# Patient Record
Sex: MALE | Race: WHITE | URBAN - METROPOLITAN AREA
[De-identification: names, ages, dates, MRNs, and addresses within clinical notes are randomized per-mention and may not be internally consistent; named-entity substitution may affect disease eponyms.]

---

## 2024-09-26 ENCOUNTER — HOSPITAL ENCOUNTER (OUTPATIENT)
Facility: MEDICAL CENTER | Age: 39
End: 2024-09-26
Attending: EMERGENCY MEDICINE | Admitting: INTERNAL MEDICINE

## 2024-09-26 ENCOUNTER — APPOINTMENT (OUTPATIENT)
Dept: RADIOLOGY | Facility: MEDICAL CENTER | Age: 39
End: 2024-09-26
Attending: EMERGENCY MEDICINE
Payer: MEDICAID

## 2024-09-26 DIAGNOSIS — E03.9 ACQUIRED HYPOTHYROIDISM: ICD-10-CM

## 2024-09-26 DIAGNOSIS — R55 VASOVAGAL SYNCOPE: ICD-10-CM

## 2024-09-26 DIAGNOSIS — I10 PRIMARY HYPERTENSION: ICD-10-CM

## 2024-09-26 DIAGNOSIS — F31.9 CHRONIC BIPOLAR DISORDER (HCC): ICD-10-CM

## 2024-09-26 PROBLEM — M62.82 RHABDOMYOLYSIS: Status: ACTIVE | Noted: 2024-09-26

## 2024-09-26 PROBLEM — G93.40 ENCEPHALOPATHY: Status: ACTIVE | Noted: 2024-09-26

## 2024-09-26 PROBLEM — R45.851 SUICIDAL IDEATIONS: Status: ACTIVE | Noted: 2024-09-26

## 2024-09-26 LAB
AMMONIA PLAS-SCNC: 22 UMOL/L (ref 11–45)
AMPHET UR QL SCN: NEGATIVE
ANION GAP SERPL CALC-SCNC: 13 MMOL/L (ref 7–16)
APPEARANCE UR: CLEAR
BARBITURATES UR QL SCN: NEGATIVE
BASE EXCESS BLDV CALC-SCNC: 1 MMOL/L
BASOPHILS # BLD AUTO: 0.7 % (ref 0–1.8)
BASOPHILS # BLD: 0.04 K/UL (ref 0–0.12)
BENZODIAZ UR QL SCN: POSITIVE
BILIRUB UR QL STRIP.AUTO: NEGATIVE
BODY TEMPERATURE: 36.7 CENTIGRADE
BUN SERPL-MCNC: 20 MG/DL (ref 8–22)
BZE UR QL SCN: NEGATIVE
CALCIUM SERPL-MCNC: 10 MG/DL (ref 8.5–10.5)
CANNABINOIDS UR QL SCN: POSITIVE
CHLORIDE SERPL-SCNC: 103 MMOL/L (ref 96–112)
CK SERPL-CCNC: 528 U/L (ref 0–154)
CO2 SERPL-SCNC: 22 MMOL/L (ref 20–33)
COLOR UR: YELLOW
CREAT SERPL-MCNC: 1.13 MG/DL (ref 0.5–1.4)
EKG IMPRESSION: NORMAL
EOSINOPHIL # BLD AUTO: 0.15 K/UL (ref 0–0.51)
EOSINOPHIL NFR BLD: 2.5 % (ref 0–6.9)
ERYTHROCYTE [DISTWIDTH] IN BLOOD BY AUTOMATED COUNT: 40.2 FL (ref 35.9–50)
ETHANOL BLD-MCNC: <10.1 MG/DL
FENTANYL UR QL: NEGATIVE
GFR SERPLBLD CREATININE-BSD FMLA CKD-EPI: 86 ML/MIN/1.73 M 2
GLUCOSE SERPL-MCNC: 85 MG/DL (ref 65–99)
GLUCOSE UR STRIP.AUTO-MCNC: NEGATIVE MG/DL
HCO3 BLDV-SCNC: 27 MMOL/L (ref 24–28)
HCT VFR BLD AUTO: 42.5 % (ref 42–52)
HGB BLD-MCNC: 13.7 G/DL (ref 14–18)
IMM GRANULOCYTES # BLD AUTO: 0.02 K/UL (ref 0–0.11)
IMM GRANULOCYTES NFR BLD AUTO: 0.3 % (ref 0–0.9)
INHALED O2 FLOW RATE: NORMAL L/MIN
KETONES UR STRIP.AUTO-MCNC: NEGATIVE MG/DL
LEUKOCYTE ESTERASE UR QL STRIP.AUTO: NEGATIVE
LITHIUM SERPL-MCNC: 0.5 MMOL/L (ref 0.6–1.2)
LYMPHOCYTES # BLD AUTO: 2 K/UL (ref 1–4.8)
LYMPHOCYTES NFR BLD: 33.1 % (ref 22–41)
MCH RBC QN AUTO: 27.7 PG (ref 27–33)
MCHC RBC AUTO-ENTMCNC: 32.2 G/DL (ref 32.3–36.5)
MCV RBC AUTO: 85.9 FL (ref 81.4–97.8)
METHADONE UR QL SCN: NEGATIVE
MICRO URNS: NORMAL
MONOCYTES # BLD AUTO: 0.6 K/UL (ref 0–0.85)
MONOCYTES NFR BLD AUTO: 9.9 % (ref 0–13.4)
NEUTROPHILS # BLD AUTO: 3.24 K/UL (ref 1.82–7.42)
NEUTROPHILS NFR BLD: 53.5 % (ref 44–72)
NITRITE UR QL STRIP.AUTO: NEGATIVE
NRBC # BLD AUTO: 0 K/UL
NRBC BLD-RTO: 0 /100 WBC (ref 0–0.2)
OPIATES UR QL SCN: NEGATIVE
OXYCODONE UR QL SCN: NEGATIVE
PCO2 BLDV: 47.2 MMHG (ref 41–51)
PCO2 TEMP ADJ BLDV: 46.6 MMHG (ref 41–51)
PCP UR QL SCN: NEGATIVE
PH BLDV: 7.37 [PH] (ref 7.31–7.45)
PH TEMP ADJ BLDV: 7.38 [PH] (ref 7.31–7.45)
PH UR STRIP.AUTO: 6 [PH] (ref 5–8)
PLATELET # BLD AUTO: 268 K/UL (ref 164–446)
PMV BLD AUTO: 10.4 FL (ref 9–12.9)
PO2 BLDV: 31.1 MMHG (ref 25–40)
PO2 TEMP ADJ BLDV: 30.4 MMHG (ref 25–40)
POTASSIUM SERPL-SCNC: 4.1 MMOL/L (ref 3.6–5.5)
PROLACTIN SERPL-MCNC: 38 NG/ML (ref 2.1–17.7)
PROPOXYPH UR QL SCN: NEGATIVE
PROT UR QL STRIP: NEGATIVE MG/DL
RBC # BLD AUTO: 4.95 M/UL (ref 4.7–6.1)
RBC UR QL AUTO: NEGATIVE
SAO2 % BLDV: 57.5 %
SODIUM SERPL-SCNC: 138 MMOL/L (ref 135–145)
SP GR UR STRIP.AUTO: 1.02
T PALLIDUM AB SER QL IA: NORMAL
TSH SERPL DL<=0.005 MIU/L-ACNC: 2.08 UIU/ML (ref 0.38–5.33)
UROBILINOGEN UR STRIP.AUTO-MCNC: 0.2 MG/DL
VIT B12 SERPL-MCNC: 438 PG/ML (ref 211–911)
WBC # BLD AUTO: 6.1 K/UL (ref 4.8–10.8)

## 2024-09-26 PROCEDURE — 80048 BASIC METABOLIC PNL TOTAL CA: CPT

## 2024-09-26 PROCEDURE — 70450 CT HEAD/BRAIN W/O DYE: CPT

## 2024-09-26 PROCEDURE — 84443 ASSAY THYROID STIM HORMONE: CPT

## 2024-09-26 PROCEDURE — 71045 X-RAY EXAM CHEST 1 VIEW: CPT

## 2024-09-26 PROCEDURE — 82077 ASSAY SPEC XCP UR&BREATH IA: CPT

## 2024-09-26 PROCEDURE — 84146 ASSAY OF PROLACTIN: CPT

## 2024-09-26 PROCEDURE — G0378 HOSPITAL OBSERVATION PER HR: HCPCS

## 2024-09-26 PROCEDURE — A9270 NON-COVERED ITEM OR SERVICE: HCPCS | Mod: UD | Performed by: INTERNAL MEDICINE

## 2024-09-26 PROCEDURE — 82140 ASSAY OF AMMONIA: CPT

## 2024-09-26 PROCEDURE — 82607 VITAMIN B-12: CPT

## 2024-09-26 PROCEDURE — A9270 NON-COVERED ITEM OR SERVICE: HCPCS | Performed by: EMERGENCY MEDICINE

## 2024-09-26 PROCEDURE — 99223 1ST HOSP IP/OBS HIGH 75: CPT | Performed by: INTERNAL MEDICINE

## 2024-09-26 PROCEDURE — 700111 HCHG RX REV CODE 636 W/ 250 OVERRIDE (IP): Mod: JZ | Performed by: EMERGENCY MEDICINE

## 2024-09-26 PROCEDURE — 72170 X-RAY EXAM OF PELVIS: CPT

## 2024-09-26 PROCEDURE — 80178 ASSAY OF LITHIUM: CPT

## 2024-09-26 PROCEDURE — 80307 DRUG TEST PRSMV CHEM ANLYZR: CPT

## 2024-09-26 PROCEDURE — 86780 TREPONEMA PALLIDUM: CPT

## 2024-09-26 PROCEDURE — 93005 ELECTROCARDIOGRAM TRACING: CPT | Performed by: EMERGENCY MEDICINE

## 2024-09-26 PROCEDURE — 85025 COMPLETE CBC W/AUTO DIFF WBC: CPT

## 2024-09-26 PROCEDURE — 36415 COLL VENOUS BLD VENIPUNCTURE: CPT

## 2024-09-26 PROCEDURE — 82550 ASSAY OF CK (CPK): CPT

## 2024-09-26 PROCEDURE — 99285 EMERGENCY DEPT VISIT HI MDM: CPT

## 2024-09-26 PROCEDURE — 700102 HCHG RX REV CODE 250 W/ 637 OVERRIDE(OP): Mod: UD | Performed by: INTERNAL MEDICINE

## 2024-09-26 PROCEDURE — 96374 THER/PROPH/DIAG INJ IV PUSH: CPT

## 2024-09-26 PROCEDURE — 82803 BLOOD GASES ANY COMBINATION: CPT

## 2024-09-26 PROCEDURE — 81003 URINALYSIS AUTO W/O SCOPE: CPT | Mod: XU

## 2024-09-26 PROCEDURE — 700102 HCHG RX REV CODE 250 W/ 637 OVERRIDE(OP): Performed by: EMERGENCY MEDICINE

## 2024-09-26 RX ORDER — GEMFIBROZIL 600 MG/1
600 TABLET, FILM COATED ORAL 2 TIMES DAILY
COMMUNITY
End: 2024-11-25 | Stop reason: SDUPTHER

## 2024-09-26 RX ORDER — SODIUM CHLORIDE 9 MG/ML
INJECTION, SOLUTION INTRAVENOUS CONTINUOUS
Status: DISCONTINUED | OUTPATIENT
Start: 2024-09-26 | End: 2024-09-28

## 2024-09-26 RX ORDER — ONDANSETRON 4 MG/1
4 TABLET, ORALLY DISINTEGRATING ORAL EVERY 4 HOURS PRN
Status: DISCONTINUED | OUTPATIENT
Start: 2024-09-26 | End: 2024-10-08 | Stop reason: HOSPADM

## 2024-09-26 RX ORDER — LURASIDONE HYDROCHLORIDE 40 MG/1
120 TABLET, FILM COATED ORAL
Status: DISCONTINUED | OUTPATIENT
Start: 2024-09-26 | End: 2024-10-08 | Stop reason: HOSPADM

## 2024-09-26 RX ORDER — PROMETHAZINE HYDROCHLORIDE 25 MG/1
12.5-25 SUPPOSITORY RECTAL EVERY 4 HOURS PRN
Status: DISCONTINUED | OUTPATIENT
Start: 2024-09-26 | End: 2024-10-08 | Stop reason: HOSPADM

## 2024-09-26 RX ORDER — LORAZEPAM 2 MG/1
2 TABLET ORAL
Status: DISCONTINUED | OUTPATIENT
Start: 2024-09-26 | End: 2024-09-30

## 2024-09-26 RX ORDER — LABETALOL HYDROCHLORIDE 5 MG/ML
10 INJECTION, SOLUTION INTRAVENOUS EVERY 4 HOURS PRN
Status: DISCONTINUED | OUTPATIENT
Start: 2024-09-26 | End: 2024-10-06

## 2024-09-26 RX ORDER — OLANZAPINE 5 MG/1
20 TABLET ORAL
Status: DISCONTINUED | OUTPATIENT
Start: 2024-09-26 | End: 2024-09-27

## 2024-09-26 RX ORDER — LITHIUM CARBONATE 300 MG
600 TABLET ORAL DAILY
Status: DISCONTINUED | OUTPATIENT
Start: 2024-09-27 | End: 2024-09-27

## 2024-09-26 RX ORDER — OXYCODONE HYDROCHLORIDE 5 MG/1
5 TABLET ORAL
Status: DISCONTINUED | OUTPATIENT
Start: 2024-09-26 | End: 2024-10-06

## 2024-09-26 RX ORDER — LEVOTHYROXINE SODIUM 112 UG/1
112 TABLET ORAL
Status: DISCONTINUED | OUTPATIENT
Start: 2024-09-27 | End: 2024-10-08 | Stop reason: HOSPADM

## 2024-09-26 RX ORDER — POLYETHYLENE GLYCOL 3350 17 G/17G
1 POWDER, FOR SOLUTION ORAL
Status: DISCONTINUED | OUTPATIENT
Start: 2024-09-26 | End: 2024-10-08 | Stop reason: HOSPADM

## 2024-09-26 RX ORDER — CHLORPROMAZINE HCI 25 MG/ML
100 INJECTION INTRAMUSCULAR
Status: ON HOLD | COMMUNITY
End: 2024-10-07

## 2024-09-26 RX ORDER — LURASIDONE HYDROCHLORIDE 60 MG/1
120 TABLET, FILM COATED ORAL
COMMUNITY
End: 2024-11-25

## 2024-09-26 RX ORDER — HYDROXYZINE PAMOATE 50 MG/1
50 CAPSULE ORAL EVERY 6 HOURS PRN
Status: ON HOLD | COMMUNITY
End: 2024-10-07

## 2024-09-26 RX ORDER — LORAZEPAM 2 MG/1
2 TABLET ORAL
Status: ON HOLD | COMMUNITY
End: 2024-10-07

## 2024-09-26 RX ORDER — DIPHENHYDRAMINE HYDROCHLORIDE 50 MG/ML
25 INJECTION INTRAMUSCULAR; INTRAVENOUS EVERY 8 HOURS PRN
Status: DISCONTINUED | OUTPATIENT
Start: 2024-09-26 | End: 2024-09-30

## 2024-09-26 RX ORDER — HYDROXYZINE HYDROCHLORIDE 50 MG/1
50 TABLET, FILM COATED ORAL EVERY 6 HOURS PRN
Status: DISCONTINUED | OUTPATIENT
Start: 2024-09-26 | End: 2024-10-08 | Stop reason: HOSPADM

## 2024-09-26 RX ORDER — LITHIUM CARBONATE 600 MG/1
900 CAPSULE ORAL DAILY
COMMUNITY

## 2024-09-26 RX ORDER — HYDROMORPHONE HYDROCHLORIDE 1 MG/ML
0.25 INJECTION, SOLUTION INTRAMUSCULAR; INTRAVENOUS; SUBCUTANEOUS
Status: DISCONTINUED | OUTPATIENT
Start: 2024-09-26 | End: 2024-10-06

## 2024-09-26 RX ORDER — IXEKIZUMAB 80 MG/ML
80 INJECTION, SOLUTION SUBCUTANEOUS
COMMUNITY

## 2024-09-26 RX ORDER — PROCHLORPERAZINE EDISYLATE 5 MG/ML
5-10 INJECTION INTRAMUSCULAR; INTRAVENOUS EVERY 4 HOURS PRN
Status: DISCONTINUED | OUTPATIENT
Start: 2024-09-26 | End: 2024-10-08 | Stop reason: HOSPADM

## 2024-09-26 RX ORDER — OXYCODONE HYDROCHLORIDE 5 MG/1
2.5 TABLET ORAL
Status: DISCONTINUED | OUTPATIENT
Start: 2024-09-26 | End: 2024-10-06

## 2024-09-26 RX ORDER — GEMFIBROZIL 600 MG/1
600 TABLET, FILM COATED ORAL 2 TIMES DAILY
Status: DISCONTINUED | OUTPATIENT
Start: 2024-09-26 | End: 2024-10-08 | Stop reason: HOSPADM

## 2024-09-26 RX ORDER — LEVOTHYROXINE SODIUM 112 UG/1
112 TABLET ORAL
COMMUNITY

## 2024-09-26 RX ORDER — LOSARTAN POTASSIUM 25 MG/1
25 TABLET ORAL DAILY
Status: DISCONTINUED | OUTPATIENT
Start: 2024-09-27 | End: 2024-10-08 | Stop reason: HOSPADM

## 2024-09-26 RX ORDER — OLANZAPINE 20 MG/1
20 TABLET, ORALLY DISINTEGRATING ORAL
Status: ON HOLD | COMMUNITY
End: 2024-10-07

## 2024-09-26 RX ORDER — PROMETHAZINE HYDROCHLORIDE 25 MG/1
12.5-25 TABLET ORAL EVERY 4 HOURS PRN
Status: DISCONTINUED | OUTPATIENT
Start: 2024-09-26 | End: 2024-10-08 | Stop reason: HOSPADM

## 2024-09-26 RX ORDER — LORAZEPAM 2 MG/ML
1 INJECTION INTRAMUSCULAR ONCE
Status: COMPLETED | OUTPATIENT
Start: 2024-09-26 | End: 2024-09-26

## 2024-09-26 RX ORDER — ENOXAPARIN SODIUM 100 MG/ML
40 INJECTION SUBCUTANEOUS DAILY
Status: DISCONTINUED | OUTPATIENT
Start: 2024-09-26 | End: 2024-10-06

## 2024-09-26 RX ORDER — TRAZODONE HYDROCHLORIDE 50 MG/1
100 TABLET, FILM COATED ORAL
Status: ON HOLD | COMMUNITY
End: 2024-10-07

## 2024-09-26 RX ORDER — ONDANSETRON 2 MG/ML
4 INJECTION INTRAMUSCULAR; INTRAVENOUS EVERY 4 HOURS PRN
Status: DISCONTINUED | OUTPATIENT
Start: 2024-09-26 | End: 2024-10-08 | Stop reason: HOSPADM

## 2024-09-26 RX ORDER — DIPHENHYDRAMINE HYDROCHLORIDE 50 MG/ML
25 INJECTION INTRAMUSCULAR; INTRAVENOUS EVERY 8 HOURS PRN
Status: ON HOLD | COMMUNITY
End: 2024-10-07

## 2024-09-26 RX ORDER — AMOXICILLIN 250 MG
2 CAPSULE ORAL EVERY EVENING
Status: DISCONTINUED | OUTPATIENT
Start: 2024-09-26 | End: 2024-10-08 | Stop reason: HOSPADM

## 2024-09-26 RX ORDER — ACETAMINOPHEN 325 MG/1
650 TABLET ORAL EVERY 6 HOURS PRN
Status: DISCONTINUED | OUTPATIENT
Start: 2024-09-26 | End: 2024-10-06

## 2024-09-26 RX ORDER — LORAZEPAM 2 MG/1
2 TABLET ORAL ONCE
Status: COMPLETED | OUTPATIENT
Start: 2024-09-26 | End: 2024-09-26

## 2024-09-26 RX ORDER — CHLORPROMAZINE HYDROCHLORIDE 200 MG/1
200 TABLET, FILM COATED ORAL
Status: ON HOLD | COMMUNITY
End: 2024-10-07

## 2024-09-26 RX ORDER — VITAMIN B COMPLEX
1000 TABLET ORAL DAILY
Status: ON HOLD | COMMUNITY
End: 2024-10-07

## 2024-09-26 RX ORDER — HYDROXYZINE PAMOATE 50 MG/1
50 CAPSULE ORAL EVERY 6 HOURS PRN
Status: DISCONTINUED | OUTPATIENT
Start: 2024-09-26 | End: 2024-09-26

## 2024-09-26 RX ORDER — LOSARTAN POTASSIUM 25 MG/1
25 TABLET ORAL DAILY
COMMUNITY

## 2024-09-26 RX ADMIN — LORAZEPAM 1 MG: 2 INJECTION INTRAMUSCULAR; INTRAVENOUS at 15:01

## 2024-09-26 RX ADMIN — OLANZAPINE 20 MG: 5 TABLET, FILM COATED ORAL at 20:27

## 2024-09-26 RX ADMIN — LORAZEPAM 2 MG: 2 TABLET ORAL at 18:39

## 2024-09-26 RX ADMIN — SENNOSIDES AND DOCUSATE SODIUM 2 TABLET: 50; 8.6 TABLET ORAL at 20:27

## 2024-09-26 RX ADMIN — LURASIDONE HYDROCHLORIDE 120 MG: 40 TABLET, FILM COATED ORAL at 20:27

## 2024-09-26 RX ADMIN — GEMFIBROZIL 600 MG: 600 TABLET ORAL at 20:27

## 2024-09-26 ASSESSMENT — ENCOUNTER SYMPTOMS
PHOTOPHOBIA: 0
SPEECH CHANGE: 0
POLYDIPSIA: 0
BRUISES/BLEEDS EASILY: 0
COUGH: 0
NERVOUS/ANXIOUS: 0
BACK PAIN: 0
NECK PAIN: 0
HEMOPTYSIS: 0
DOUBLE VISION: 0
HEARTBURN: 0
FLANK PAIN: 0
ORTHOPNEA: 0
TREMORS: 0
VOMITING: 0
FEVER: 0
HEADACHES: 0
PALPITATIONS: 0
WEIGHT LOSS: 0
FOCAL WEAKNESS: 0
SPUTUM PRODUCTION: 0
BLURRED VISION: 0
CHILLS: 0
NAUSEA: 0
HALLUCINATIONS: 0

## 2024-09-26 ASSESSMENT — LIFESTYLE VARIABLES
TOTAL SCORE: 0
TOTAL SCORE: 0
DOES PATIENT WANT TO STOP DRINKING: NO
HAVE YOU EVER FELT YOU SHOULD CUT DOWN ON YOUR DRINKING: NO
AVERAGE NUMBER OF DAYS PER WEEK YOU HAVE A DRINK CONTAINING ALCOHOL: 0
HOW MANY TIMES IN THE PAST YEAR HAVE YOU HAD 5 OR MORE DRINKS IN A DAY: 0
EVER HAD A DRINK FIRST THING IN THE MORNING TO STEADY YOUR NERVES TO GET RID OF A HANGOVER: NO
HAVE PEOPLE ANNOYED YOU BY CRITICIZING YOUR DRINKING: NO
TOTAL SCORE: 0
ON A TYPICAL DAY WHEN YOU DRINK ALCOHOL HOW MANY DRINKS DO YOU HAVE: 0
ALCOHOL_USE: NO
CONSUMPTION TOTAL: NEGATIVE
SUBSTANCE_ABUSE: 0
EVER FELT BAD OR GUILTY ABOUT YOUR DRINKING: NO

## 2024-09-26 ASSESSMENT — COGNITIVE AND FUNCTIONAL STATUS - GENERAL
DAILY ACTIVITIY SCORE: 24
SUGGESTED CMS G CODE MODIFIER DAILY ACTIVITY: CH
SUGGESTED CMS G CODE MODIFIER MOBILITY: CH
MOBILITY SCORE: 24

## 2024-09-26 ASSESSMENT — PATIENT HEALTH QUESTIONNAIRE - PHQ9
SUM OF ALL RESPONSES TO PHQ QUESTIONS 1-9: 15
6. FEELING BAD ABOUT YOURSELF - OR THAT YOU ARE A FAILURE OR HAVE LET YOURSELF OR YOUR FAMILY DOWN: MORE THAN HALF THE DAYS
8. MOVING OR SPEAKING SO SLOWLY THAT OTHER PEOPLE COULD HAVE NOTICED. OR THE OPPOSITE, BEING SO FIGETY OR RESTLESS THAT YOU HAVE BEEN MOVING AROUND A LOT MORE THAN USUAL: MORE THAN HALF THE DAYS
SUM OF ALL RESPONSES TO PHQ9 QUESTIONS 1 AND 2: 5
7. TROUBLE CONCENTRATING ON THINGS, SUCH AS READING THE NEWSPAPER OR WATCHING TELEVISION: MORE THAN HALF THE DAYS
9. THOUGHTS THAT YOU WOULD BE BETTER OFF DEAD, OR OF HURTING YOURSELF: NOT AT ALL
4. FEELING TIRED OR HAVING LITTLE ENERGY: MORE THAN HALF THE DAYS
3. TROUBLE FALLING OR STAYING ASLEEP OR SLEEPING TOO MUCH: MORE THAN HALF THE DAYS
1. LITTLE INTEREST OR PLEASURE IN DOING THINGS: MORE THAN HALF THE DAYS
5. POOR APPETITE OR OVEREATING: NOT AT ALL
2. FEELING DOWN, DEPRESSED, IRRITABLE, OR HOPELESS: NEARLY EVERY DAY

## 2024-09-26 ASSESSMENT — SOCIAL DETERMINANTS OF HEALTH (SDOH)
WITHIN THE LAST YEAR, HAVE YOU BEEN KICKED, HIT, SLAPPED, OR OTHERWISE PHYSICALLY HURT BY YOUR PARTNER OR EX-PARTNER?: NO
WITHIN THE LAST YEAR, HAVE YOU BEEN AFRAID OF YOUR PARTNER OR EX-PARTNER?: NO
WITHIN THE LAST YEAR, HAVE YOU BEEN HUMILIATED OR EMOTIONALLY ABUSED IN OTHER WAYS BY YOUR PARTNER OR EX-PARTNER?: NO
WITHIN THE LAST YEAR, HAVE TO BEEN RAPED OR FORCED TO HAVE ANY KIND OF SEXUAL ACTIVITY BY YOUR PARTNER OR EX-PARTNER?: NO

## 2024-09-26 ASSESSMENT — PAIN DESCRIPTION - PAIN TYPE: TYPE: ACUTE PAIN

## 2024-09-26 NOTE — ED NOTES
Pt has L2K precautions in place w/ 1:1 sitter at bedside w/ eyes on pt at all time. Pt aware of POC, reports no needs at this time.

## 2024-09-26 NOTE — ED PROVIDER NOTES
"ED Provider Note    CHIEF COMPLAINT  Chief Complaint   Patient presents with    Other     Abnormal behavior per staff at [facility]       EXTERNAL RECORDS REVIEWED  External ED Note evaluated at neighboring facility earlier this month for acute yamila bipolar disorder and acute psychosis.  Currently residing at inpatient psychiatric facility.    HPI/ROS  LIMITATION TO HISTORY     OUTSIDE HISTORIAN(S):      Lee Yadav is a 39 y.o. male who presents to the emergency department with altered mental status.  Per staff at the [facility] health facility patient has been nonverbal and meds looked at them with a blank stare.  Will track them will not respond to any communications.  Nurse reported similar interaction with the patient.  Upon my evaluation the patient patient states that he does not feel well.  States that he has passed out twice in the last couple days.  He does not know why he passed out he had no chest pain or palpitations no headache.  He does report that the second time he passed out he fell onto his right hip and somewhat injured his right hip.  No nausea no vomiting no fevers no chills no cough no chest pain no palpitations no other acute symptom change or concerns he reports that he is been taking his medications.    PAST MEDICAL HISTORY   Bipolar disorder, schizophrenic type    SURGICAL HISTORY  patient denies any surgical history    FAMILY HISTORY  No family history on file.    SOCIAL HISTORY  Social History     Tobacco Use    Smoking status: Not on file    Smokeless tobacco: Not on file   Substance and Sexual Activity    Alcohol use: Not on file    Drug use: Not on file    Sexual activity: Not on file       CURRENT MEDICATIONS  Home Medications    **Home medications have not yet been reviewed for this encounter**         ALLERGIES  Not on File    PHYSICAL EXAM  VITAL SIGNS: /82   Pulse 80   Temp 36.7 °C (98 °F) (Temporal)   Resp 18   Ht 1.956 m (6' 5\")   Wt 99.8 kg (220 lb)   SpO2 96%   " BMI 26.09 kg/m²      Pulse ox interpretation: I interpret this pulse ox as normal.  Constitutional: Alert and oriented x 3, minimal istress  HEENT: Atraumatic normocephalic, pupils are equal round reactive to light extraocular movements are intact. The nares is clear, external ears are normal, mouth shows moist mucous membranes normal dentition for age  Neck: Supple, no JVD no tracheal deviation  Cardiovascular: Regular rate and rhythm no murmur rub or gallop 2+ pulses peripherally x4  Thorax & Lungs: No respiratory distress, no wheezes rales or rhonchi, No chest tenderness.   GI: Soft nontender nondistended positive bowel sounds, no peritoneal signs  Skin: Warm dry no acute rash or lesion  Musculoskeletal: Moving all extremities with full range and 5 of 5 strength no acute  deformity  Neurologic: Cranial nerves III through XII are grossly intact no sensory deficit no cerebellar dysfunction   Psychiatric: Flat affect avoids direct eye contact.    LABS  Results for orders placed or performed during the hospital encounter of 09/26/24   CBC WITH DIFFERENTIAL   Result Value Ref Range    WBC 6.1 4.8 - 10.8 K/uL    RBC 4.95 4.70 - 6.10 M/uL    Hemoglobin 13.7 (L) 14.0 - 18.0 g/dL    Hematocrit 42.5 42.0 - 52.0 %    MCV 85.9 81.4 - 97.8 fL    MCH 27.7 27.0 - 33.0 pg    MCHC 32.2 (L) 32.3 - 36.5 g/dL    RDW 40.2 35.9 - 50.0 fL    Platelet Count 268 164 - 446 K/uL    MPV 10.4 9.0 - 12.9 fL    Neutrophils-Polys 53.50 44.00 - 72.00 %    Lymphocytes 33.10 22.00 - 41.00 %    Monocytes 9.90 0.00 - 13.40 %    Eosinophils 2.50 0.00 - 6.90 %    Basophils 0.70 0.00 - 1.80 %    Immature Granulocytes 0.30 0.00 - 0.90 %    Nucleated RBC 0.00 0.00 - 0.20 /100 WBC    Neutrophils (Absolute) 3.24 1.82 - 7.42 K/uL    Lymphs (Absolute) 2.00 1.00 - 4.80 K/uL    Monos (Absolute) 0.60 0.00 - 0.85 K/uL    Eos (Absolute) 0.15 0.00 - 0.51 K/uL    Baso (Absolute) 0.04 0.00 - 0.12 K/uL    Immature Granulocytes (abs) 0.02 0.00 - 0.11 K/uL    NRBC  (Absolute) 0.00 K/uL   BASIC METABOLIC PANEL   Result Value Ref Range    Sodium 138 135 - 145 mmol/L    Potassium 4.1 3.6 - 5.5 mmol/L    Chloride 103 96 - 112 mmol/L    Co2 22 20 - 33 mmol/L    Glucose 85 65 - 99 mg/dL    Bun 20 8 - 22 mg/dL    Creatinine 1.13 0.50 - 1.40 mg/dL    Calcium 10.0 8.5 - 10.5 mg/dL    Anion Gap 13.0 7.0 - 16.0   DIAGNOSTIC ALCOHOL   Result Value Ref Range    Diagnostic Alcohol <10.1 <10.1 mg/dL   URINE DRUG SCREEN   Result Value Ref Range    Amphetamines Urine Negative Negative    Barbiturates Negative Negative    Benzodiazepines Positive (A) Negative    Cocaine Metabolite Negative Negative    Fentanyl, Urine Negative Negative    Methadone Negative Negative    Opiates Negative Negative    Oxycodone Negative Negative    Phencyclidine -Pcp Negative Negative    Propoxyphene Negative Negative    Cannabinoid Metab Positive (A) Negative   URINALYSIS,CULTURE IF INDICATED    Specimen: Urine   Result Value Ref Range    Color Yellow     Character Clear     Specific Gravity 1.017 <1.035    Ph 6.0 5.0 - 8.0    Glucose Negative Negative mg/dL    Ketones Negative Negative mg/dL    Protein Negative Negative mg/dL    Bilirubin Negative Negative    Urobilinogen, Urine 0.2 Negative    Nitrite Negative Negative    Leukocyte Esterase Negative Negative    Occult Blood Negative Negative    Micro Urine Req see below    ESTIMATED GFR   Result Value Ref Range    GFR (CKD-EPI) 86 >60 mL/min/1.73 m 2   PROLACTIN   Result Value Ref Range    Prolactin 38.00 (H) 2.10 - 17.70 ng/mL   CREATINE KINASE   Result Value Ref Range    CPK Total 528 (H) 0 - 154 U/L   TSH WITH REFLEX TO FT4   Result Value Ref Range    TSH 2.080 0.380 - 5.330 uIU/mL   AMMONIA   Result Value Ref Range    Ammonia 22 11 - 45 umol/L   T.PALLIDUM AB EHSAN (SCREENING)   Result Value Ref Range    Syphilis, Treponemal Qual Non-Reactive Non-Reactive   VITAMIN B12   Result Value Ref Range    Vitamin B12 -True Cobalamin 438 211 - 911 pg/mL   VENOUS BLOOD  GAS   Result Value Ref Range    Venous Bg Ph 7.37 7.31 - 7.45    Venous Bg Ph Temp Corrected 7.38 7.31 - 7.45    Venous Bg Pco2 47.2 41.0 - 51.0 mmHg    Venous Bg Pco2 Temp Corrected 46.6 41.0 - 51.0 mmHg    Venous Bg Po2 31.1 25.0 - 40.0 mmHg    Venous Bg Po2 Temp Corrected 30.4 25.0 - 40.0 mmHg    Venous Bg O2 Saturation 57.5 %    Venous Bg Hco3 27 24 - 28 mmol/L    Venous Bg Base Excess 1 mmol/L    Body Temp 36.7 Centigrade    O2 Therapy na    LITHIUM   Result Value Ref Range    Lithium 0.5 (L) 0.6 - 1.2 mmol/L   EKG (NOW)   Result Value Ref Range    Report       [facility] Emergency Dept.    Test Date:  2024  Pt Name:    LORENZO DOMINGUEZ                  Department:   MRN:        [id number]                      Room:       [facility]  Gender:     Male                         Technician: [id number]  :        1985                   Requested By:MEGGAN JOYCE  Order #:    [id number]                    Reading MD:    Measurements  Intervals                                Axis  Rate:       69                           P:          -38  KS:         151                          QRS:        59  QRSD:       102                          T:          39  QT:         406  QTc:        435    Interpretive Statements  Sinus rhythm  ST elev, probable normal early repol pattern  Baseline wander in lead(s) V1,V2,V6  No previous ECG available for comparison         I have independently interpreted this EKG    RADIOLOGY/PROCEDURES   I have independently interpreted the diagnostic imaging associated with this visit and am waiting the final reading from the radiologist.   My preliminary interpretation is as follows: no acute cranial hemorrhage or mass effect    Radiologist interpretation:  CT-HEAD W/O   Final Result      Normal CT of the head without IV contrast.               DX-PELVIS-1 OR 2 VIEWS   Final Result      1. No acute post traumatic imaging findings.   2. Moderate degenerative changes involving  the sacroiliac joints, greater than expected for the age of the patient.      DX-CHEST-PORTABLE (1 VIEW)   Final Result      No acute cardiac or pulmonary abnormalities are identified.          COURSE & MEDICAL DECISION MAKING    ASSESSMENT, COURSE AND PLAN  Care Narrative: Patient sent from inpatient psychiatric facility for altered mental status.  There was some concern for possible seizure activity.  Initially his workup was fairly unremarkable here and patient had nothing that represented what appeared to be seizure activity.  An abundance of caution I did order a prolactin level and this did in fact come back quite elevated.  This does raise suspicion for possible seizure therefore patient will be admitted for further evaluation and treatment.  I discussed case with hospitalist Dr. Castro and patient will be admitted for EEG.            FINAL DIAGNOSIS  1. Chronic bipolar disorder (HCC) Active   2.  Possible seizure activity versus mild catatonic state     Electronically signed by: Ricky Bentley M.D.

## 2024-09-26 NOTE — ED NOTES
Medication history reviewed with MAR provided by [facility] [phone]. Med rec is complete  Allergies reviewed.     Patient has not had any outpatient antibiotics in the last 30 days.   Anticoagulants: No    Frances Rubalcava

## 2024-09-26 NOTE — ED NOTES
Bedside report received from off going RN/tech: julio cesar, assumed care of patient.  POC discussed with patient. Call light within reach, all needs addressed at this time.       Fall risk interventions in place: Not Applicable (all applicable per [facility] Fall risk assessment)   Continuous monitoring: Not Applicable   IVF/IV medications: Not Applicable   Oxygen: Room Air  Bedside sitter: Pt on L2k SI with 1:1 sitter   (name), Report given to sitter, and checklist completed, stop sign in doorway  Isolation: Not Applicable

## 2024-09-26 NOTE — ED TRIAGE NOTES
"Chief Complaint   Patient presents with    Other     Abnormal behavior per staff at [facility]     Pt ROSEMARY CHAUDHARYSA from [facility] for abnormal behavior. Per staff, pt had two episodes where he stopped moving and responding (however, would track with his eyes). Pt is normally A&O4 at baseline and is now non-verbal since starting Thorazine yesterday. Pt is on a L2K.    /82   Pulse 80   Temp 36.7 °C (98 °F) (Temporal)   Resp 18   Ht 1.956 m (6' 5\")   Wt 99.8 kg (220 lb)   SpO2 96%     "

## 2024-09-27 PROBLEM — F29 PSYCHOSIS (HCC): Status: ACTIVE | Noted: 2024-09-27

## 2024-09-27 PROBLEM — F41.9 ANXIETY: Status: ACTIVE | Noted: 2024-09-27

## 2024-09-27 LAB
ALBUMIN SERPL BCP-MCNC: 3.8 G/DL (ref 3.2–4.9)
ALBUMIN/GLOB SERPL: 1.6 G/DL
ALP SERPL-CCNC: 42 U/L (ref 30–99)
ALT SERPL-CCNC: 18 U/L (ref 2–50)
ANION GAP SERPL CALC-SCNC: 11 MMOL/L (ref 7–16)
AST SERPL-CCNC: 30 U/L (ref 12–45)
BASOPHILS # BLD AUTO: 0.7 % (ref 0–1.8)
BASOPHILS # BLD: 0.05 K/UL (ref 0–0.12)
BILIRUB SERPL-MCNC: 0.5 MG/DL (ref 0.1–1.5)
BUN SERPL-MCNC: 20 MG/DL (ref 8–22)
CALCIUM ALBUM COR SERPL-MCNC: 9.9 MG/DL (ref 8.5–10.5)
CALCIUM SERPL-MCNC: 9.7 MG/DL (ref 8.5–10.5)
CHLORIDE SERPL-SCNC: 107 MMOL/L (ref 96–112)
CK SERPL-CCNC: 437 U/L (ref 0–154)
CO2 SERPL-SCNC: 23 MMOL/L (ref 20–33)
CREAT SERPL-MCNC: 0.99 MG/DL (ref 0.5–1.4)
EOSINOPHIL # BLD AUTO: 0.17 K/UL (ref 0–0.51)
EOSINOPHIL NFR BLD: 2.5 % (ref 0–6.9)
ERYTHROCYTE [DISTWIDTH] IN BLOOD BY AUTOMATED COUNT: 40.8 FL (ref 35.9–50)
GFR SERPLBLD CREATININE-BSD FMLA CKD-EPI: 100 ML/MIN/1.73 M 2
GLOBULIN SER CALC-MCNC: 2.4 G/DL (ref 1.9–3.5)
GLUCOSE SERPL-MCNC: 90 MG/DL (ref 65–99)
HCT VFR BLD AUTO: 40.1 % (ref 42–52)
HGB BLD-MCNC: 13.3 G/DL (ref 14–18)
IMM GRANULOCYTES # BLD AUTO: 0.02 K/UL (ref 0–0.11)
IMM GRANULOCYTES NFR BLD AUTO: 0.3 % (ref 0–0.9)
LYMPHOCYTES # BLD AUTO: 1.95 K/UL (ref 1–4.8)
LYMPHOCYTES NFR BLD: 28.5 % (ref 22–41)
MCH RBC QN AUTO: 28.6 PG (ref 27–33)
MCHC RBC AUTO-ENTMCNC: 33.2 G/DL (ref 32.3–36.5)
MCV RBC AUTO: 86.2 FL (ref 81.4–97.8)
MONOCYTES # BLD AUTO: 0.79 K/UL (ref 0–0.85)
MONOCYTES NFR BLD AUTO: 11.5 % (ref 0–13.4)
NEUTROPHILS # BLD AUTO: 3.87 K/UL (ref 1.82–7.42)
NEUTROPHILS NFR BLD: 56.5 % (ref 44–72)
NRBC # BLD AUTO: 0 K/UL
NRBC BLD-RTO: 0 /100 WBC (ref 0–0.2)
PLATELET # BLD AUTO: 249 K/UL (ref 164–446)
PMV BLD AUTO: 10.3 FL (ref 9–12.9)
POTASSIUM SERPL-SCNC: 4 MMOL/L (ref 3.6–5.5)
PROT SERPL-MCNC: 6.2 G/DL (ref 6–8.2)
RBC # BLD AUTO: 4.65 M/UL (ref 4.7–6.1)
SODIUM SERPL-SCNC: 141 MMOL/L (ref 135–145)
WBC # BLD AUTO: 6.9 K/UL (ref 4.8–10.8)

## 2024-09-27 PROCEDURE — 700111 HCHG RX REV CODE 636 W/ 250 OVERRIDE (IP): Mod: JZ | Performed by: INTERNAL MEDICINE

## 2024-09-27 PROCEDURE — 700102 HCHG RX REV CODE 250 W/ 637 OVERRIDE(OP): Mod: UD

## 2024-09-27 PROCEDURE — A9270 NON-COVERED ITEM OR SERVICE: HCPCS | Mod: UD

## 2024-09-27 PROCEDURE — G0378 HOSPITAL OBSERVATION PER HR: HCPCS

## 2024-09-27 PROCEDURE — 700105 HCHG RX REV CODE 258: Performed by: INTERNAL MEDICINE

## 2024-09-27 PROCEDURE — 36415 COLL VENOUS BLD VENIPUNCTURE: CPT

## 2024-09-27 PROCEDURE — 82550 ASSAY OF CK (CPK): CPT

## 2024-09-27 PROCEDURE — 99244 OFF/OP CNSLTJ NEW/EST MOD 40: CPT | Performed by: STUDENT IN AN ORGANIZED HEALTH CARE EDUCATION/TRAINING PROGRAM

## 2024-09-27 PROCEDURE — A9270 NON-COVERED ITEM OR SERVICE: HCPCS | Performed by: INTERNAL MEDICINE

## 2024-09-27 PROCEDURE — 85025 COMPLETE CBC W/AUTO DIFF WBC: CPT

## 2024-09-27 PROCEDURE — 96375 TX/PRO/DX INJ NEW DRUG ADDON: CPT

## 2024-09-27 PROCEDURE — 700102 HCHG RX REV CODE 250 W/ 637 OVERRIDE(OP): Performed by: STUDENT IN AN ORGANIZED HEALTH CARE EDUCATION/TRAINING PROGRAM

## 2024-09-27 PROCEDURE — 700102 HCHG RX REV CODE 250 W/ 637 OVERRIDE(OP): Performed by: INTERNAL MEDICINE

## 2024-09-27 PROCEDURE — 80053 COMPREHEN METABOLIC PANEL: CPT

## 2024-09-27 PROCEDURE — 99233 SBSQ HOSP IP/OBS HIGH 50: CPT | Performed by: STUDENT IN AN ORGANIZED HEALTH CARE EDUCATION/TRAINING PROGRAM

## 2024-09-27 PROCEDURE — A9270 NON-COVERED ITEM OR SERVICE: HCPCS | Performed by: STUDENT IN AN ORGANIZED HEALTH CARE EDUCATION/TRAINING PROGRAM

## 2024-09-27 RX ORDER — LITHIUM CARBONATE 300 MG
900 TABLET ORAL
Status: DISCONTINUED | OUTPATIENT
Start: 2024-09-27 | End: 2024-10-08 | Stop reason: HOSPADM

## 2024-09-27 RX ORDER — OLANZAPINE 5 MG/1
10 TABLET ORAL 2 TIMES DAILY
Status: DISCONTINUED | OUTPATIENT
Start: 2024-09-28 | End: 2024-09-30

## 2024-09-27 RX ORDER — LITHIUM CARBONATE 300 MG
300 TABLET ORAL
Status: COMPLETED | OUTPATIENT
Start: 2024-09-27 | End: 2024-09-27

## 2024-09-27 RX ORDER — DIPHENHYDRAMINE HCL 25 MG
25 TABLET ORAL ONCE
Status: COMPLETED | OUTPATIENT
Start: 2024-09-27 | End: 2024-09-27

## 2024-09-27 RX ADMIN — LORAZEPAM 2 MG: 2 TABLET ORAL at 17:01

## 2024-09-27 RX ADMIN — LOSARTAN POTASSIUM 25 MG: 25 TABLET, FILM COATED ORAL at 05:35

## 2024-09-27 RX ADMIN — LITHIUM CARBONATE 600 MG: 300 TABLET ORAL at 05:35

## 2024-09-27 RX ADMIN — GEMFIBROZIL 600 MG: 600 TABLET ORAL at 09:24

## 2024-09-27 RX ADMIN — LITHIUM CARBONATE 900 MG: 300 TABLET ORAL at 20:43

## 2024-09-27 RX ADMIN — SODIUM CHLORIDE: 9 INJECTION, SOLUTION INTRAVENOUS at 19:26

## 2024-09-27 RX ADMIN — HYDROXYZINE HYDROCHLORIDE 50 MG: 50 TABLET, FILM COATED ORAL at 12:33

## 2024-09-27 RX ADMIN — SODIUM CHLORIDE: 9 INJECTION, SOLUTION INTRAVENOUS at 11:00

## 2024-09-27 RX ADMIN — LEVOTHYROXINE SODIUM 112 MCG: 0.11 TABLET ORAL at 05:34

## 2024-09-27 RX ADMIN — LURASIDONE HYDROCHLORIDE 120 MG: 40 TABLET, FILM COATED ORAL at 20:43

## 2024-09-27 RX ADMIN — LORAZEPAM 2 MG: 2 TABLET ORAL at 01:39

## 2024-09-27 RX ADMIN — LITHIUM CARBONATE 300 MG: 300 TABLET ORAL at 20:43

## 2024-09-27 RX ADMIN — HYDROXYZINE HYDROCHLORIDE 50 MG: 50 TABLET, FILM COATED ORAL at 22:08

## 2024-09-27 RX ADMIN — SODIUM CHLORIDE: 9 INJECTION, SOLUTION INTRAVENOUS at 00:11

## 2024-09-27 RX ADMIN — DIPHENHYDRAMINE HYDROCHLORIDE 25 MG: 50 INJECTION, SOLUTION INTRAMUSCULAR; INTRAVENOUS at 20:42

## 2024-09-27 RX ADMIN — GEMFIBROZIL 600 MG: 600 TABLET ORAL at 20:43

## 2024-09-27 RX ADMIN — DIPHENHYDRAMINE HYDROCHLORIDE 25 MG: 25 TABLET ORAL at 00:50

## 2024-09-27 ASSESSMENT — ENCOUNTER SYMPTOMS
HALLUCINATIONS: 1
NAUSEA: 0
SHORTNESS OF BREATH: 0
PALPITATIONS: 0
DIZZINESS: 0
MYALGIAS: 0
VOMITING: 0
ABDOMINAL PAIN: 0
HEADACHES: 0
COUGH: 0
FEVER: 0
NERVOUS/ANXIOUS: 1
CHILLS: 0

## 2024-09-27 ASSESSMENT — PAIN DESCRIPTION - PAIN TYPE
TYPE: ACUTE PAIN
TYPE: ACUTE PAIN

## 2024-09-27 ASSESSMENT — FIBROSIS 4 INDEX: FIB4 SCORE: 1.05

## 2024-09-27 NOTE — ED NOTES
Blood drawn from PIV for VBG, Lithium, and Ammonia; sent to lab. Pt medicated per MAR as well and tolerated well. Pt updated regarding bed assignment upstairs and pending transport upstairs, pt demonstrated understanding.

## 2024-09-27 NOTE — ASSESSMENT & PLAN NOTE
Differential diagnosis may include polypharmacy and side effects of his psychiatric medications, given added Thorazine 2 days ago, and catatonic state, non convulsive seizure  CT head without contrast is negative  Prolactin was noted is elevated which is nonspecific, and could be elevated as a side effect of neuroleptics, as well as after seizures  CPK is elevated that could be result of muscle injury or again side effects of antipsychotics.  However there is no increased muscle tone on exam  Plan: Evaluate patient with EEG.  Check ammonia, TSH, vitamin B12, lithium level  Consider psychiatric consultation for medications management given concern for side effect and polypharmacy    9/27/2024  Appears to be improving.  Alert and oriented x3.  States he from [place] and in [place] due to psychiatric issues.  Not sure if he wants to go back to [facility].  Agreeable to EEG.  Discussed with EEG technician.  Discussed with Dr. Rushing of psychiatry.    9/28/2024  Alert and orient x 3.  Had a ground-level fall after staring episode.  He was just taken off telemetry monitoring.  I discussed with the EEG technician about obtaining urgent EEG.    9/30/2024  Continues to be alert and orient x 3.    9/29/2024  Alert and orient x 3.  Improving.

## 2024-09-27 NOTE — DISCHARGE PLANNING
Case Management Discharge Planning    Admission Date: 9/26/2024  GMLOS:    ALOS: 0    6-Clicks ADL Score: 24  6-Clicks Mobility Score: 24      Anticipated Discharge Dispo: Discharge Disposition: D/T to psych hosp or distinct part unit (65)    DME Needed: No    Action(s) Taken: Updated Provider/Nurse on Discharge Plan and DC Assessment Complete (See below)    0950, Pt was visited at room to obtain assessment information and discuss discharge planning.    1141, RN VALARIE sent message to [facility] to screen if Pt has Medicaid since Pt said he has Medicaid for insurance.    Escalations Completed: None    Medically Clear: No    Next Steps: CM will continue to assist Pt with discharge needs.      Barriers to Discharge: Medical clearance and Pending Placement    Is the patient up for discharge tomorrow: No      Care Transition Team Assessment  RN VALARIE met with Pt at bedside to obtain assessment informations. Demographics verified. RN VALARIE introduced self and purpose of visit.   Pt lives in [facility].  Pt has parents and Attendant/care givers at [facility] for support.  Pt has Dr. Wilks from [facility] for PCP  Pt was independent with ADLs prior to hospitalization.      Information Source  Orientation Level: Oriented X4  Information Given By: Patient      Elopement Risk  Legal Hold: Elopement Risk  Ambulatory or Self Mobile in Wheelchair: Yes  Disoriented: No  Psychiatric Symptoms: None  History of Wandering: No  Elopement this Admit: No  Vocalizing Wanting to Leave: No  Displays Behaviors, Body Language Wanting to Leave: No-Not at Risk for Elopement  Time of Legal Hold: 2211  Date of Legal Hold: 09/12/24  Elopement Risk: Not at Risk for Elopement  Wanderguard On: Yes  Personal Belongings: Hospital Clothing Only, Possessions Checked for Security / Elopement Risk, Anything Considered Risk for Security or Elopement, Removed and Stored in Secure Area (Specify Area)  Environmental Precautions: Sharp or Dangerous Items Removed, Dietary  Notified for Plastic Utensils / Paperware Only    Interdisciplinary Discharge Planning  Primary Care Physician: Dr. Wilks from [facility]  Lives with - Patient's Self Care Capacity: Other (Comments) (Attendant/care givers at [facility])  Patient or legal guardian wants to designate a caregiver: No  Support Systems: Parent, Other (Comments) (Attendant/care givers at [facility])    Discharge Preparedness  What is your plan after discharge?: Other (comment) (psych hosp)  What are your discharge supports?: Parent, Other (comment) (Attendant/care givers at [facility])  Prior Functional Level: Ambulatory, Independent with Activities of Daily Living    Functional Assesment  Prior Functional Level: Ambulatory, Independent with Activities of Daily Living    Vision / Hearing Impairment  Vision Impairment : No  Hearing Impairment : No    Domestic Abuse  Have you ever been the victim of abuse or violence?: No  Possible Abuse/Neglect Reported to:: Not Applicable    Psychological Assessment  History of Substance Abuse: None  History of Psychiatric Problems: Yes (Pt said Bipolar disorder.)    Anticipated Discharge Information  Discharge Disposition: D/T to psych hosp or distinct part unit (65)

## 2024-09-27 NOTE — ED NOTES
Pt's parents called for update. Updated pt's mom and dad with pt's permission and per Nursing Communication on chart. Primary RN notified.

## 2024-09-27 NOTE — ED NOTES
Called Lab to add TSH, Vitamin B12, T. Pallidum, and Creatine Kinase onto blood samples already in lab,  states he will run them now. Will draw blood for additional tests.

## 2024-09-27 NOTE — ED NOTES
Received report from Shima ELIAS RN. Upon shift change pt is resting in bed with even and unlabored breaths, in no apparent distress. Pt is connected to monitoring devices and is on room air, tolerating well. Suicide precautions are in place, including 1:1 sitter. Will continue to monitor.

## 2024-09-27 NOTE — PROGRESS NOTES
Bedside report received from Catherine WILD. Pt assessment complete. Pt AO x 4. Reviewed plan of care with pt. Pt is tele monitored, 1:1 sitter present in room and L2k stop sign reviewed and signed. Chart and labs reviewed. Bed in lowest position, and 2 side rails up. Hourly rounding in place

## 2024-09-27 NOTE — ASSESSMENT & PLAN NOTE
Patient is vague in regards to questioning for suicidal ideations  He has been on legal hold  Will put suicidal precautions    9/27/2024  Continues with one to one sitter with suicide precautions.    9/29/2024  Continues on one-to-one sitter with suicide precautions.

## 2024-09-27 NOTE — EEG PROGRESS NOTE
Pt initially refused EEG while tech was setting up. Reached out to nurse who encouraged pt to have EEG done, pt agreed. However, pt very uncooperative and wouldn't allow tech to place leads appropriately and began removing them as tech was placing them on. Unable to perform EEG until pt is cooperative / agrees to procedure.

## 2024-09-27 NOTE — PROGRESS NOTES
Pt requested anxiety medication. Stated that he felt very anxious and nothing was helping. Pt parents called and stated that they worried about him being anxious because prior to eloping from prior facilities he would be very restless and pacing. Discussed and reinforced to sitter elopement risk and only ambulating on unit

## 2024-09-27 NOTE — DISCHARGE PLANNING
Received email from Enedelia at [facility] stating pt was transferred to [facility] from their facility.  Pt on a 6 month commitment, scanned copy of court order into pt's chart.  Notice of appearance will be filed to the court.

## 2024-09-27 NOTE — ED NOTES
Called report to TORRI Espitia. Will continue to monitor pt while awaiting RN arrival to transport pt upstairs.

## 2024-09-27 NOTE — DISCHARGE PLANNING
TORRI asked to arrange transportation ot Pt back to [facility] as Pt is medically cleared.  TORRI completed [facility] and faxed all required documentation to [facility]. TORRI called [facility] and was informed by Suzan that they are able to accommodate the Pt trip at 1905.  Packet with original legal hold prepared and will be given to ER RN for transport.

## 2024-09-27 NOTE — PROGRESS NOTES
4 Eyes Skin Assessment Completed by TORRI Espitia and LLUVIA Coe.    Head WDL  Ears WDL  Nose WDL  Mouth WDL  Neck WDL  Breast/Chest WDL  Shoulder Blades WDL  Spine WDL  (R) Arm/Elbow/Hand WDL  (L) Arm/Elbow/Hand WDL  Abdomen WDL  Groin WDL  Scrotum/Coccyx/Buttocks WDL  (R) Leg WDL  (L) Leg WDL  (R) Heel/Foot/Toe WDL  (L) Heel/Foot/Toe WDL          Devices In Places Tele Box      Interventions In Place Pillows    Possible Skin Injury No    Pictures Uploaded Into Epic N/A  Wound Consult Placed N/A  RN Wound Prevention Protocol Ordered No
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
## 2024-09-27 NOTE — PROGRESS NOTES
Pt sent from [facility] on legal hold. Pt admitted under legal hold.   All items removed from room per SI checklist, checklist reviewed with 1:1 sitter who is present in room.

## 2024-09-27 NOTE — PROGRESS NOTES
SW from [facility] called asking if patient had come with paperwork. RN informed her that there was court appointed paperwork in the chart. She stated patient was committed for 6 months and that family had been visiting daily at the facility. Per SW they are from [place] but have been staying at [facility]. Informed her that normally we have to have orders from psych stating what patients can and can't have but that I could call them once I knew regarding his privileges.

## 2024-09-27 NOTE — ASSESSMENT & PLAN NOTE
Continue levothyroxine  Recheck TSH    9/28/2024  TSH of 2.08.  Within normal limits.  Continue levothyroxine 112 mcg daily.

## 2024-09-27 NOTE — ASSESSMENT & PLAN NOTE
Traumatic versus nontraumatic  IV hydration  Repeat CPK  If goes up, consider holding antipsychotics.  Check lithium level    9/27/2024  Improving to 437  Check level tomorrow  Question whether related to seizure.

## 2024-09-27 NOTE — PROGRESS NOTES
Pt very anxious. Asking if he can walk around. Informed him that he can but with staff. Pt walking around unit back and forth very rapidly. Educated pt on POC and EEG that is pending. Discussed safe mobilization.

## 2024-09-27 NOTE — CARE PLAN
The patient is Stable - Low risk of patient condition declining or worsening    Shift Goals  Clinical Goals: safety, vss, 1:1 sitter  Patient Goals: rest  Family Goals: pippa    Progress made toward(s) clinical / shift goals:  pt safety maintained, personal belongings removed from room. 1:1 sitter present, safety checklist reviewed.      Problem: Knowledge Deficit - Standard  Goal: Patient and family/care givers will demonstrate understanding of plan of care, disease process/condition, diagnostic tests and medications  Outcome: Progressing  Note: Pt participating in plan of care with questions for care team. Plan of care reviewed with patient at bedside. All questions addressed.      Problem: Pain - Standard  Goal: Alleviation of pain or a reduction in pain to the patient’s comfort goal  Outcome: Progressing  Note: Pt able to verbalize pain on 0-10 scale when prompted. Pt provided with comfort measures of repositioning with blankets and pillows.        Patient is not progressing towards the following goals:

## 2024-09-27 NOTE — ASSESSMENT & PLAN NOTE
Suspected side effect of antipsychotics, causing ineffective postural vascular tone reflex, orthostatic hypotension, orthostatic syncope  EKG does not show QTc prolongation  Will monitor on telemetry for arrhythmia  And give bolus of saline 1 L  Orthostatic vitals    9/27/2024  Continue telemetry monitoring.    9/28/2024  Patient was discontinued telemetry after which he had a ground-level fall following a staring episode.  I have reinitiated continuous telemetry monitoring.  Does not appear to be cardiogenic in nature.  Suspect possible seizures.  EEG has been ordered.    9/29/2024  No further episodes.  Continue continuous cardiac monitoring  Spot EEG was negative  I have ordered 24-hour continuous EEG monitoring    9/30/2024  No further syncope episodes.  Patient agreeable to continuous EEG monitoring and continuous telemetry monitoring.

## 2024-09-27 NOTE — H&P
Hospital Medicine History & Physical Note    Date of Service  9/26/2024    Primary Care Physician  No primary care provider on file.      Code Status  Full Code    Chief Complaint  Chief Complaint   Patient presents with    Other     Abnormal behavior per staff at [facility]       History of Presenting Illness  Lee Yadav is a 39 y.o. male with history of bipolar disorder who presented 9/26/2024 from psychiatric facility [facility], where he has been hospitalized for his psychiatric illness, with concern for periods of altered mentation in the last 2 days.  Per ERP Dr. Bentley who spoke to MD at psychiatric facility, patient would have.  When he becomes nonverbal and nonresponsive, looking with a blank stare, will track but would not respond to questions.  Patient himself states that he had several orthostatic syncopal episode, most recently earlier today when he was feeling dizzy, passed out and fell hitting his right hip.  Currently he reports some hip pain.  Reportedly he was started on Thorazine 2 days ago  Denies any GI illness.  In ER vital stable  Prolactin was checked and elevated at 38  CPK elevated at 528.  Alcohol was not elevated.  UDS: Cannabinoids, benzodiazepines.  UA is negative.  VBG is unremarkable.  CT head without contrast negative.  Pelvic x-ray: Degenerative changes in the sacroiliac joints.  EKG: Sinus rhythm heart rate 69, no significant T/ST fluctuations, QTc is not prolonged.    Multiple psychiatric medications noted in the patient outpatient medications list: Thorazine, lithium, Latuda, olanzapine, trazodone    I discussed the plan of care with patient and ERP .    Review of Systems  Review of Systems   Constitutional:  Negative for chills, fever and weight loss.   HENT:  Negative for ear pain, hearing loss and tinnitus.    Eyes:  Negative for blurred vision, double vision and photophobia.   Respiratory:  Negative for cough, hemoptysis and sputum production.    Cardiovascular:  Negative for  chest pain, palpitations and orthopnea.   Gastrointestinal:  Negative for heartburn, nausea and vomiting.   Genitourinary:  Negative for dysuria, flank pain, frequency and hematuria.   Musculoskeletal:  Positive for joint pain. Negative for back pain and neck pain.   Skin:  Negative for itching and rash.   Neurological:  Negative for tremors, speech change, focal weakness and headaches.   Endo/Heme/Allergies:  Negative for environmental allergies and polydipsia. Does not bruise/bleed easily.   Psychiatric/Behavioral:  Negative for hallucinations and substance abuse. The patient is not nervous/anxious.        Past Medical History   has no past medical history on file.    Surgical History   has no past surgical history on file.     Family History  family history is not on file.   Family history reviewed with patient. There is no family history that is pertinent to the chief complaint.     Social History       Allergies  Not on File    Medications  Prior to Admission Medications   Prescriptions Last Dose Informant Patient Reported? Taking?   Ixekizumab (TALTZ) 80 MG/ML Solution Prefilled Syringe 9/18/2024 at 1904 MAR from Other Facility Yes Yes   Sig: Inject 80 mg under the skin every 28 days.   LORazepam (ATIVAN) 2 MG tablet 9/26/2024 at 0912 MAR from Other Facility Yes Yes   Sig: Take 2 mg by mouth every 2 hours as needed for Anxiety. Do not exceed 10mg per 24 hours   Lurasidone HCl (LATUDA) 60 MG Tab 9/25/2024 at 1942 MAR from Other Facility Yes Yes   Sig: Take 120 mg by mouth at bedtime. 2 tablets= 60mg   OLANZAPINE IM 9/25/2024 at 1024 MAR from Other Facility Yes Yes   Sig: Inject 10 mg into the shoulder, thigh, or buttocks every four hours as needed (agitation). Max dose 40mg/ day on all forms of zyprexa   chlorproMAZINE (THORAZINE) 200 MG tablet 9/26/2024 at 0652 MAR from Other Facility Yes Yes   Sig: Take 200 mg by mouth every 1 hour as needed (acute psychotic symptoms with agitation). Max 1000mg/ day    chlorpromazine (THORAZINE) 25 MG/ML Solution 9/25/2024 at 1334 MAR from Other Facility Yes Yes   Sig: Inject 100 mg into the shoulder, thigh, or buttocks every 1 hour as needed (acute psychotic symptoms with agitation). Max 1000mg per day   diphenhydrAMINE (BENADRYL) 50 MG/ML Solution 9/25/2024 at 1024 MAR from Other Facility Yes Yes   Sig: Infuse 25 mg into a venous catheter every 8 hours as needed (severe anxiety with agitation).   gemfibrozil (LOPID) 600 MG Tab 9/26/2024 at 0652 MAR from Other Facility Yes Yes   Sig: Take 600 mg by mouth 2 times a day.   hydrOXYzine pamoate (VISTARIL) 50 MG Cap 9/24/2024 at 1901 MAR from Other Facility Yes Yes   Sig: Take 50 mg by mouth every 6 hours as needed for Anxiety.   levothyroxine (SYNTHROID) 112 MCG Tab 9/26/2024 at 0652 MAR from Other Facility Yes Yes   Sig: Take 112 mcg by mouth every morning on an empty stomach.   lithium 600 MG capsule 9/26/2024 at 0652 MAR from Other Facility Yes Yes   Sig: Take 600 mg by mouth every day.   losartan (COZAAR) 25 MG Tab 9/26/2024 at 0652 MAR from Other Facility Yes Yes   Sig: Take 25 mg by mouth every day. Hold if BP <100/60   olanzapine zydis (ZYPREXA) 20 MG disintegrating tablet 9/25/2024 at 1942 MAR from Other Facility Yes Yes   Sig: Take 20 mg by mouth at bedtime.   traZODone (DESYREL) 50 MG Tab 9/25/2024 at 1948 MAR from Other Facility Yes Yes   Sig: Take 100 mg by mouth at bedtime. 2 tablets= 100mg   vitamin D3 (CHOLECALCIFEROL) 1000 Unit (25 mcg) Tab 9/26/2024 at 0652 MAR from Other Facility Yes Yes   Sig: Take 1,000 Units by mouth every day.      Facility-Administered Medications: None       Physical Exam  Temp:  [36.7 °C (98 °F)] 36.7 °C (98 °F)  Pulse:  [65-81] 73  Resp:  [18] 18  BP: (114-165)/(67-94) 130/94  SpO2:  [94 %-99 %] 99 %  Blood Pressure: (!) 130/94   Temperature: 36.7 °C (98 °F)   Pulse: 73   Respiration: 18   Pulse Oximetry: 99 %       Physical Exam  Vitals and nursing note reviewed.   Constitutional:        "General: He is not in acute distress.     Appearance: Normal appearance.   HENT:      Head: Normocephalic and atraumatic.      Nose: Nose normal.      Mouth/Throat:      Mouth: Mucous membranes are moist.   Eyes:      Extraocular Movements: Extraocular movements intact.      Pupils: Pupils are equal, round, and reactive to light.   Cardiovascular:      Rate and Rhythm: Normal rate and regular rhythm.   Pulmonary:      Effort: Pulmonary effort is normal.      Breath sounds: Normal breath sounds.   Abdominal:      General: Abdomen is flat. There is no distension.      Tenderness: There is no abdominal tenderness.   Musculoskeletal:         General: No swelling or deformity. Normal range of motion.      Cervical back: Normal range of motion and neck supple.   Skin:     General: Skin is warm and dry.   Neurological:      General: No focal deficit present.      Mental Status: He is alert and oriented to person, place, and time.   Psychiatric:         Mood and Affect: Mood normal.         Behavior: Behavior normal.         Laboratory:  Recent Labs     09/26/24  1509   WBC 6.1   RBC 4.95   HEMOGLOBIN 13.7*   HEMATOCRIT 42.5   MCV 85.9   MCH 27.7   MCHC 32.2*   RDW 40.2   PLATELETCT 268   MPV 10.4     Recent Labs     09/26/24  1509   SODIUM 138   POTASSIUM 4.1   CHLORIDE 103   CO2 22   GLUCOSE 85   BUN 20   CREATININE 1.13   CALCIUM 10.0     Recent Labs     09/26/24  1509   GLUCOSE 85         No results for input(s): \"NTPROBNP\" in the last 72 hours.      No results for input(s): \"TROPONINT\" in the last 72 hours.    Imaging:  CT-HEAD W/O   Final Result      Normal CT of the head without IV contrast.               DX-PELVIS-1 OR 2 VIEWS   Final Result      1. No acute post traumatic imaging findings.   2. Moderate degenerative changes involving the sacroiliac joints, greater than expected for the age of the patient.      DX-CHEST-PORTABLE (1 VIEW)   Final Result      No acute cardiac or pulmonary abnormalities are identified. "          X-Ray:  I have personally reviewed the images and compared with prior images.    Assessment/Plan:  Justification for Admission Status  I anticipate this patient is appropriate for observation status at this time because syncope, encephalopathy    Patient will need a Telemetry bed on MEDICAL service .  The need is secondary to syncope, encephalopathy.    * Encephalopathy- (present on admission)  Assessment & Plan  Differential diagnosis may include polypharmacy and side effects of his psychiatric medications, given added Thorazine 2 days ago, and catatonic state, non convulsive seizure  CT head without contrast is negative  Prolactin was noted is elevated which is nonspecific, and could be elevated as a side effect of neuroleptics, as well as after seizures  CPK is elevated that could be result of muscle injury or again side effects of antipsychotics.  However there is no increased muscle tone on exam  Plan: Evaluate patient with EEG.  Check ammonia, TSH, vitamin B12, lithium level  Consider psychiatric consultation for medications management given concern for side effect and polypharmacy      Hypothyroidism  Assessment & Plan  Continue levothyroxine  Recheck TSH    Hypertension  Assessment & Plan  Continue losartan  Monitor blood pressure    Rhabdomyolysis  Assessment & Plan  Traumatic versus nontraumatic  IV hydration  Repeat CPK  If goes up, consider holding antipsychotics.  Check lithium level    Syncope  Assessment & Plan  Suspected side effect of antipsychotics, causing ineffective postural vascular tone reflex, orthostatic hypotension, orthostatic syncope  EKG does not show QTc prolongation  Will monitor on telemetry for arrhythmia  And give bolus of saline 1 L  Orthostatic vitals    Suicidal ideations  Assessment & Plan  Patient is vague in regards to questioning for suicidal ideations  He has been on legal hold  Will put suicidal precautions        VTE prophylaxis: enoxaparin ppx

## 2024-09-27 NOTE — ED NOTES
Spoke w/ charge RN at [facility], per RN the physician at the facility requesting for specific tests to be done. ERP made aware.

## 2024-09-28 PROBLEM — F99 INSOMNIA DUE TO OTHER MENTAL DISORDER: Status: ACTIVE | Noted: 2024-09-28

## 2024-09-28 PROBLEM — F12.90 MARIJUANA USE: Status: ACTIVE | Noted: 2024-09-28

## 2024-09-28 PROBLEM — F51.05 INSOMNIA DUE TO OTHER MENTAL DISORDER: Status: ACTIVE | Noted: 2024-09-28
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
## 2024-09-28 LAB — CK SERPL-CCNC: 226 U/L (ref 0–154)

## 2024-09-28 PROCEDURE — 95816 EEG AWAKE AND DROWSY: CPT | Mod: 26 | Performed by: PSYCHIATRY & NEUROLOGY

## 2024-09-28 PROCEDURE — G0378 HOSPITAL OBSERVATION PER HR: HCPCS

## 2024-09-28 PROCEDURE — 700111 HCHG RX REV CODE 636 W/ 250 OVERRIDE (IP): Mod: JZ | Performed by: INTERNAL MEDICINE

## 2024-09-28 PROCEDURE — A9270 NON-COVERED ITEM OR SERVICE: HCPCS | Performed by: INTERNAL MEDICINE

## 2024-09-28 PROCEDURE — 96376 TX/PRO/DX INJ SAME DRUG ADON: CPT

## 2024-09-28 PROCEDURE — 95816 EEG AWAKE AND DROWSY: CPT | Performed by: PSYCHIATRY & NEUROLOGY

## 2024-09-28 PROCEDURE — 700111 HCHG RX REV CODE 636 W/ 250 OVERRIDE (IP): Mod: UD

## 2024-09-28 PROCEDURE — 99233 SBSQ HOSP IP/OBS HIGH 50: CPT | Performed by: STUDENT IN AN ORGANIZED HEALTH CARE EDUCATION/TRAINING PROGRAM

## 2024-09-28 PROCEDURE — 700102 HCHG RX REV CODE 250 W/ 637 OVERRIDE(OP): Performed by: STUDENT IN AN ORGANIZED HEALTH CARE EDUCATION/TRAINING PROGRAM

## 2024-09-28 PROCEDURE — 700105 HCHG RX REV CODE 258: Performed by: INTERNAL MEDICINE

## 2024-09-28 PROCEDURE — 36415 COLL VENOUS BLD VENIPUNCTURE: CPT

## 2024-09-28 PROCEDURE — 82550 ASSAY OF CK (CPK): CPT

## 2024-09-28 PROCEDURE — 700102 HCHG RX REV CODE 250 W/ 637 OVERRIDE(OP): Performed by: INTERNAL MEDICINE

## 2024-09-28 PROCEDURE — A9270 NON-COVERED ITEM OR SERVICE: HCPCS | Performed by: STUDENT IN AN ORGANIZED HEALTH CARE EDUCATION/TRAINING PROGRAM

## 2024-09-28 RX ORDER — LORAZEPAM 2 MG/ML
0.5 INJECTION INTRAMUSCULAR ONCE
Status: COMPLETED | OUTPATIENT
Start: 2024-09-28 | End: 2024-09-28

## 2024-09-28 RX ORDER — LORAZEPAM 2 MG/ML
0.5 INJECTION INTRAMUSCULAR ONCE
Status: DISCONTINUED | OUTPATIENT
Start: 2024-09-28 | End: 2024-09-28

## 2024-09-28 RX ADMIN — OLANZAPINE 10 MG: 5 TABLET, FILM COATED ORAL at 05:41

## 2024-09-28 RX ADMIN — LORAZEPAM 2 MG: 2 TABLET ORAL at 00:02

## 2024-09-28 RX ADMIN — OXYCODONE HYDROCHLORIDE 5 MG: 5 TABLET ORAL at 23:26

## 2024-09-28 RX ADMIN — HYDROXYZINE HYDROCHLORIDE 50 MG: 50 TABLET, FILM COATED ORAL at 21:26

## 2024-09-28 RX ADMIN — SENNOSIDES AND DOCUSATE SODIUM 2 TABLET: 50; 8.6 TABLET ORAL at 17:16

## 2024-09-28 RX ADMIN — LITHIUM CARBONATE 900 MG: 300 TABLET ORAL at 20:18

## 2024-09-28 RX ADMIN — LORAZEPAM 2 MG: 2 TABLET ORAL at 19:35

## 2024-09-28 RX ADMIN — LURASIDONE HYDROCHLORIDE 120 MG: 40 TABLET, FILM COATED ORAL at 20:20

## 2024-09-28 RX ADMIN — DIPHENHYDRAMINE HYDROCHLORIDE 25 MG: 50 INJECTION, SOLUTION INTRAMUSCULAR; INTRAVENOUS at 20:19

## 2024-09-28 RX ADMIN — LEVOTHYROXINE SODIUM 112 MCG: 0.11 TABLET ORAL at 05:41

## 2024-09-28 RX ADMIN — GEMFIBROZIL 600 MG: 600 TABLET ORAL at 20:18

## 2024-09-28 RX ADMIN — SODIUM CHLORIDE: 9 INJECTION, SOLUTION INTRAVENOUS at 04:28

## 2024-09-28 RX ADMIN — LOSARTAN POTASSIUM 25 MG: 25 TABLET, FILM COATED ORAL at 05:41

## 2024-09-28 RX ADMIN — LORAZEPAM 0.5 MG: 2 INJECTION INTRAMUSCULAR; INTRAVENOUS at 01:26

## 2024-09-28 RX ADMIN — OXYCODONE HYDROCHLORIDE 5 MG: 5 TABLET ORAL at 14:43

## 2024-09-28 RX ADMIN — GEMFIBROZIL 600 MG: 600 TABLET ORAL at 08:43

## 2024-09-28 RX ADMIN — OLANZAPINE 10 MG: 5 TABLET, FILM COATED ORAL at 17:16

## 2024-09-28 ASSESSMENT — PAIN DESCRIPTION - PAIN TYPE
TYPE: ACUTE PAIN

## 2024-09-28 ASSESSMENT — ENCOUNTER SYMPTOMS
FALLS: 1
NAUSEA: 0
MYALGIAS: 0
FEVER: 0
NERVOUS/ANXIOUS: 1
ABDOMINAL PAIN: 0
HEADACHES: 0
VOMITING: 0
COUGH: 0
HALLUCINATIONS: 1
CHILLS: 0
DIZZINESS: 0
SHORTNESS OF BREATH: 0
PALPITATIONS: 0

## 2024-09-28 ASSESSMENT — FIBROSIS 4 INDEX: FIB4 SCORE: 1.05

## 2024-09-28 NOTE — ASSESSMENT & PLAN NOTE
9/27/2024  Severe paranoid vs auditory hallucinations.  Psychiatry recommendations reviewed and implemented.  Increasing lithium to 900 mg QHS  Changing olanzapine to 10 mg PO BID. Plan to taper.  Hydroxyzine as needed for anxiety.  Ativan as needed for breakthrough anxiety.     9/28/2024  Continue current medications  Receiving Ativan as needed yesterday along with Droxia seen and Benadryl for sleep, which improved his sleep.    9/29/2024  Appears to be improving on current medications.    9/30/2024  Decreasing Zyprexa to 10 mg by daily.  Continue lithium 900 mg at bedtime.  Check lithium level tomorrow.  Starting melatonin 5 mg by mouth at bedtime as needed for insomnia.  Behavioral health consult placed.  Discussed with Dr. Denis of psychiatry.

## 2024-09-28 NOTE — PROCEDURES
VIDEO ELECTROENCEPHALOGRAM REPORT      Referring provider: Dr. Wang    DOS: 09/28/24 (total recording of 28 minutes).     INDICATION:  Lee Yadav 39 y.o. male presenting with syncope    CURRENT ANTIEPILEPTIC REGIMEN: none     TECHNIQUE: 30 channel video electroencephalogram (EEG) was performed in accordance with the international 10-20 system. The study was reviewed in bipolar and referential montages. The recording examined the patient during   awake and drowsy states    DESCRIPTION OF THE RECORD:  During the wakefulness, the background showed a symmetrical 11 Hz alpha activity posteriorly with amplitude of 70 mV.  There was reactivity to eye closure/opening.  A normal anterior-posterior gradient was noted with faster beta frequencies seen anteriorly.  During drowsiness, increased theta/beta frequencies were seen.      ACTIVATION PROCEDURES:     hyperventilation was not performed    Intermittent Photic stimulation was performed in a stepwise fashion from 1 to 30 Hz and elicited a normal response (photic driving), most noticeable in the posterior leads.    ICTAL AND/OR INTERICTAL FINDINGS:   No focal or generalized epileptiform activity noted. No regional slowing was seen during this routine study.  No clinical events or seizures were reported or recorded during the study.     EKG: sampling of the EKG recording demonstrated sinus rhythm.     EVENTS: none     INTERPRETATION:    This is a normal video EEG recording in the awake and drowsy states.   Note: A normal EEG does not rule out epilepsy.Clinical correlation is recommended.     Jose Antonio Tan MD  Diplomate in Neurology&Epilepsy  Office: [phone]  Fax: [phone]

## 2024-09-28 NOTE — ASSESSMENT & PLAN NOTE
9/27/2024  Continue hydroxyzine as needed.  Ativan as needed for breakthrough anxiety.    9/28/2024  Continue hydroxyzine and Ativan

## 2024-09-28 NOTE — HOSPITAL COURSE
Lee Yadav is a 39 y.o. male with history of bipolar disorder who presented 9/26/2024 from psychiatric facility [facility], where he has been hospitalized for his psychiatric illness, with concern for periods of altered mentation in the last 2 days.  Per ERP Dr. Bentley who spoke to MD at psychiatric facility, patient would have.  When he becomes nonverbal and nonresponsive, looking with a blank stare, will track but would not respond to questions.  Patient himself states that he had several orthostatic syncopal episode, most recently earlier today when he was feeling dizzy, passed out and fell hitting his right hip.  Currently he reports some hip pain.  Reportedly he was started on Thorazine 2 days ago  Denies any GI illness.  In ER vital stable  Prolactin was checked and elevated at 38  CPK elevated at 528.  Alcohol was not elevated.  UDS: Cannabinoids, benzodiazepines.  UA is negative.  VBG is unremarkable.  CT head without contrast negative.  Pelvic x-ray: Degenerative changes in the sacroiliac joints.  EKG: Sinus rhythm heart rate 69, no significant T/ST fluctuations, QTc is not prolonged.     Multiple psychiatric medications noted in the patient outpatient medications list: Thorazine, lithium, Latuda, olanzapine, trazodone   episodes of seizure activity.  His Zyprexa was decreased to 10mg once daily and he was started on melatonin 5mg at bedtime as needed for insomnia. Psychiatry was following. Neurology was consulted and did not recommend initiating AEDs at this time. He remains on legal hold. They recommend a one time follow up in their seizure clinic. He was ultimately medically cleared for transfer to psychiatric facility. He was accepted back to [facility] 10/8

## 2024-09-28 NOTE — PROGRESS NOTES
Hospital Medicine Daily Progress Note    Date of Service  9/27/2024    Chief Complaint  Lee Yadav is a 39 y.o. male admitted 9/26/2024 with altered mental status with concern for seizure.    Hospital Course  Lee Yadav is a 39 y.o. male with history of bipolar disorder who presented 9/26/2024 from psychiatric facility [facility], where he has been hospitalized for his psychiatric illness, with concern for periods of altered mentation in the last 2 days.  Per ERP Dr. Bentley who spoke to MD at psychiatric facility, patient would have.  When he becomes nonverbal and nonresponsive, looking with a blank stare, will track but would not respond to questions.  Patient himself states that he had several orthostatic syncopal episode, most recently earlier today when he was feeling dizzy, passed out and fell hitting his right hip.  Currently he reports some hip pain.  Reportedly he was started on Thorazine 2 days ago  Denies any GI illness.  In ER vital stable  Prolactin was checked and elevated at 38  CPK elevated at 528.  Alcohol was not elevated.  UDS: Cannabinoids, benzodiazepines.  UA is negative.  VBG is unremarkable.  CT head without contrast negative.  Pelvic x-ray: Degenerative changes in the sacroiliac joints.  EKG: Sinus rhythm heart rate 69, no significant T/ST fluctuations, QTc is not prolonged.     Multiple psychiatric medications noted in the patient outpatient medications list: Thorazine, lithium, Latuda, olanzapine, trazodone    Interval Problem Update  9/27/2024  Patient was seen and examined on the telemetry floor.  He continues on psychiatric precautions.  Parents and sister at bedside.  He is alert and orient x 3.  States that he hears other people outside the room talking about him suggestive of paranoia.    He declined EEG twice this morning.  I discussed this with the EEG technician.  I explained to the patient the importance of obtaining an EEG to figure out what is going on.  And the  patient agreed to proceed.  I communicated this with the EEG technician, will reattempt EEG.  I discussed case with Dr. Rushing of psychiatry, who states that this is a very complicated psychiatric case.  Patient is on multiple psychiatric medications.  We need to use Ativan to calm patient's paranoia in order to perform EEG.      I have discussed this patient's plan of care and discharge plan at IDT rounds today with Case Management, Nursing, Nursing leadership, and other members of the IDT team.    Consultants/Specialty  psychiatry    Code Status  Full Code    Disposition  The patient is not medically cleared for discharge to home or a post-acute facility.  Anticipate discharge to: a psychiatric hospital    I have placed the appropriate orders for post-discharge needs.    Review of Systems  Review of Systems   Constitutional:  Negative for chills and fever.   Respiratory:  Negative for cough and shortness of breath.    Cardiovascular:  Negative for chest pain and palpitations.   Gastrointestinal:  Negative for abdominal pain, nausea and vomiting.   Musculoskeletal:  Negative for joint pain and myalgias.   Neurological:  Negative for dizziness and headaches.   Psychiatric/Behavioral:  Positive for hallucinations. The patient is nervous/anxious.         Physical Exam  Temp:  [36.4 °C (97.5 °F)-36.6 °C (97.9 °F)] 36.6 °C (97.9 °F)  Pulse:  [55-90] 55  Resp:  [16-18] 17  BP: (103-155)/(58-84) 146/84  SpO2:  [91 %-99 %] 99 %    Physical Exam  Vitals and nursing note reviewed. Exam conducted with a chaperone present.   Constitutional:       General: He is not in acute distress.     Appearance: Normal appearance. He is not ill-appearing.   HENT:      Head: Normocephalic and atraumatic.      Mouth/Throat:      Mouth: Mucous membranes are moist.      Pharynx: Oropharynx is clear. No oropharyngeal exudate.   Eyes:      General: No scleral icterus.        Right eye: No discharge.         Left eye: No discharge.       Conjunctiva/sclera: Conjunctivae normal.   Cardiovascular:      Rate and Rhythm: Normal rate and regular rhythm.      Pulses: Normal pulses.      Heart sounds: Normal heart sounds. No murmur heard.  Pulmonary:      Effort: Pulmonary effort is normal. No respiratory distress.      Breath sounds: Normal breath sounds.   Abdominal:      General: Abdomen is flat. Bowel sounds are normal. There is no distension.      Palpations: Abdomen is soft.   Musculoskeletal:         General: No swelling.      Cervical back: Neck supple. No tenderness.      Right lower leg: No edema.      Left lower leg: No edema.   Skin:     General: Skin is warm and dry.      Coloration: Skin is not pale.   Neurological:      Mental Status: He is alert and oriented to person, place, and time. Mental status is at baseline.      Motor: No weakness.   Psychiatric:         Attention and Perception: Attention normal.         Mood and Affect: Mood is anxious. Affect is blunt and flat.         Speech: Speech normal.         Behavior: Behavior normal. Behavior is cooperative.         Thought Content: Thought content is paranoid.         Cognition and Memory: Cognition normal.         Judgment: Judgment normal.         Fluids    Intake/Output Summary (Last 24 hours) at 9/27/2024 2013  Last data filed at 9/27/2024 1552  Gross per 24 hour   Intake 1600 ml   Output --   Net 1600 ml        Laboratory  Recent Labs     09/26/24  1509 09/27/24  0256   WBC 6.1 6.9   RBC 4.95 4.65*   HEMOGLOBIN 13.7* 13.3*   HEMATOCRIT 42.5 40.1*   MCV 85.9 86.2   MCH 27.7 28.6   MCHC 32.2* 33.2   RDW 40.2 40.8   PLATELETCT 268 249   MPV 10.4 10.3     Recent Labs     09/26/24  1509 09/27/24  0256   SODIUM 138 141   POTASSIUM 4.1 4.0   CHLORIDE 103 107   CO2 22 23   GLUCOSE 85 90   BUN 20 20   CREATININE 1.13 0.99   CALCIUM 10.0 9.7                   Imaging  CT-HEAD W/O   Final Result      Normal CT of the head without IV contrast.               DX-PELVIS-1 OR 2 VIEWS   Final  Result      1. No acute post traumatic imaging findings.   2. Moderate degenerative changes involving the sacroiliac joints, greater than expected for the age of the patient.      DX-CHEST-PORTABLE (1 VIEW)   Final Result      No acute cardiac or pulmonary abnormalities are identified.           Assessment/Plan  * Encephalopathy- (present on admission)  Assessment & Plan  Differential diagnosis may include polypharmacy and side effects of his psychiatric medications, given added Thorazine 2 days ago, and catatonic state, non convulsive seizure  CT head without contrast is negative  Prolactin was noted is elevated which is nonspecific, and could be elevated as a side effect of neuroleptics, as well as after seizures  CPK is elevated that could be result of muscle injury or again side effects of antipsychotics.  However there is no increased muscle tone on exam  Plan: Evaluate patient with EEG.  Check ammonia, TSH, vitamin B12, lithium level  Consider psychiatric consultation for medications management given concern for side effect and polypharmacy    9/27/2024  Appears to be improving.  Alert and oriented x3.  States he from [place] and in [place] due to psychiatric issues.  Not sure if he wants to go back to [facility].  Agreeable to EEG.  Discussed with EEG technician.  Discussed with Dr. Rushing of psychiatry.      Anxiety- (present on admission)  Assessment & Plan  9/27/2024  Continue hydroxyzine as needed.  Ativan as needed for breakthrough anxiety.    Hypothyroidism  Assessment & Plan  Continue levothyroxine  Recheck TSH    Hypertension  Assessment & Plan  Continue losartan  Monitor blood pressure    9/27/2024  Stable.  Continue losartan 25 mg daily    Rhabdomyolysis  Assessment & Plan  Traumatic versus nontraumatic  IV hydration  Repeat CPK  If goes up, consider holding antipsychotics.  Check lithium level    9/27/2024  Improving to 437  Check level tomorrow  Question whether related to seizure.    Suicidal  ideations  Assessment & Plan  Patient is vague in regards to questioning for suicidal ideations  He has been on legal hold  Will put suicidal precautions    9/27/2024  Continues with one to one sitter with suicide precautions.    Syncope  Assessment & Plan  Suspected side effect of antipsychotics, causing ineffective postural vascular tone reflex, orthostatic hypotension, orthostatic syncope  EKG does not show QTc prolongation  Will monitor on telemetry for arrhythmia  And give bolus of saline 1 L  Orthostatic vitals    9/27/2024  Continue telemetry monitoring.         VTE prophylaxis:    enoxaparin ppx      I have performed a physical exam and reviewed and updated ROS and Plan today (9/27/2024). In review of yesterday's note (9/26/2024), there are no changes except as documented above.      Greater than 53 minutes spent prepping to see patient (e.g. review of tests) obtaining and/or reviewing separately obtained history. Performing a medically appropriate examination and evaluation.  Counseling and educating the patient/family/caregiver.  Ordering medications, tests, or procedures.  Referring and communicating with other health care professionals.  Documenting clinical information in EPIC.  Independently interpreting results and communicating results to patient/family/caregiver.  Care coordination.

## 2024-09-28 NOTE — CARE PLAN
The patient is Stable - Low risk of patient condition declining or worsening    Shift Goals  Clinical Goals: safety, reduce anxiety  Patient Goals: Reduce anxiety  Family Goals: Stay updated on POC and come in to visiti    Progress made toward(s) clinical / shift goals:      Problem: Safety:  Goal: Will remain free from injury this shift as seen by no harm to patient   Outcome: Progressing  Note: LH in place. Room safety checklist in use. Removed any unnecessary equipment. Discussed with patient safety and having a 1:1 sitter. Discussed Legal hold. Patient denies feelings of wanting to hurt self but does indicate he has aud hallucination.      Problem: Psychosocial Needs:  Goal: Ability to identify and develop effective coping behavior will improve as seen by pt being able to identify when he is anxious   Outcome: Progressing  Note: Patient at times very anxious, restless and pacing throughout unit. Patient able to identify he was feeling anxious and request medication. Patient also requested to know which medications are available to him for anxiety including the frequency and dosage. Pt able to repeat back which meds area available.        Patient is not progressing towards the following goals:

## 2024-09-28 NOTE — CARE PLAN
Problem: Knowledge Deficit - Standard  Goal: Patient and family/care givers will demonstrate understanding of plan of care, disease process/condition, diagnostic tests and medications  Outcome: Progressing     Problem: Pain - Standard  Goal: Alleviation of pain or a reduction in pain to the patient’s comfort goal  Outcome: Progressing     Problem: Safety:  Goal: Will remain free from injury  Outcome: Progressing   The patient is Watcher - Medium risk of patient condition declining or worsening    Shift Goals  Clinical Goals: safety  Patient Goals: speak to doctor  Family Goals: updates    Progress made toward(s) clinical / shift goals:       Patient is not progressing towards the following goals:

## 2024-09-28 NOTE — PROGRESS NOTES
"Ran to room for staff assist, pt found on floor with blood from lip laceration.  Pt is awake and alert, sitter with pt. Per pt \"I was walking and just passed out, I think\". Per sitter pt hit head at foot of the bed. Md and charge rn notified and present at bedside. Vitals taken-see mar, pharmacist notified, and post fall checklist completed.  "

## 2024-09-28 NOTE — PROGRESS NOTES
Hospital Medicine Daily Progress Note    Date of Service  9/28/2024    Chief Complaint  Lee Yadav is a 39 y.o. male admitted 9/26/2024 with altered mental status with concern for seizure.    Hospital Course  Lee Yadav is a 39 y.o. male with history of bipolar disorder who presented 9/26/2024 from psychiatric facility [facility], where he has been hospitalized for his psychiatric illness, with concern for periods of altered mentation in the last 2 days.  Per ERP Dr. Bentley who spoke to MD at psychiatric facility, patient would have.  When he becomes nonverbal and nonresponsive, looking with a blank stare, will track but would not respond to questions.  Patient himself states that he had several orthostatic syncopal episode, most recently earlier today when he was feeling dizzy, passed out and fell hitting his right hip.  Currently he reports some hip pain.  Reportedly he was started on Thorazine 2 days ago  Denies any GI illness.  In ER vital stable  Prolactin was checked and elevated at 38  CPK elevated at 528.  Alcohol was not elevated.  UDS: Cannabinoids, benzodiazepines.  UA is negative.  VBG is unremarkable.  CT head without contrast negative.  Pelvic x-ray: Degenerative changes in the sacroiliac joints.  EKG: Sinus rhythm heart rate 69, no significant T/ST fluctuations, QTc is not prolonged.     Multiple psychiatric medications noted in the patient outpatient medications list: Thorazine, lithium, Latuda, olanzapine, trazodone    Interval Problem Update  9/27/2024  Patient was seen and examined on the telemetry floor.  He continues on psychiatric precautions.  Parents and sister at bedside.  He is alert and orient x 3.  States that he hears other people outside the room talking about him suggestive of paranoia.    He declined EEG twice this morning.  I discussed this with the EEG technician.  I explained to the patient the importance of obtaining an EEG to figure out what is going on.  And the  patient agreed to proceed.  I communicated this with the EEG technician, will reattempt EEG.  I discussed case with Dr. Rushing of psychiatry, who states that this is a very complicated psychiatric case.  Patient is on multiple psychiatric medications.  We need to use Ativan to calm patient's paranoia in order to perform EEG.    9/28/2024  Patient was seen and examined on the telemetry floor.  Continues on suicide precautions.  Patient was seen on 2 separate occasions last following a ground-level fall.  States that he was up and walking and incidentally fell hitting his face onto the foot of the bed.  Had some bleeding in the upper lip and inner gum, which has subsided.  Denied preceding dizziness prior to the fall.  Witnessed by sitter, patient had a staring episode prior to the fall.  He is alert and orient x 3.  Discussed with EEG technician.  I have reordered EEG.  Earlier today, he was complaining of intermittent anxiety.  Continue to have depression but denying any suicidal ideation.  Stating that he would like to be buried with his body intact.  Assured him that he was not dying.  Denying any visual hallucinations.  Still believes that people are talking about him outside the room at times.      I have discussed this patient's plan of care and discharge plan at IDT rounds today with Case Management, Nursing, Nursing leadership, and other members of the IDT team.    Consultants/Specialty  psychiatry    Code Status  Full Code    Disposition  The patient is not medically cleared for discharge to home or a post-acute facility.  Anticipate discharge to: a psychiatric hospital    I have placed the appropriate orders for post-discharge needs.    Review of Systems  Review of Systems   Constitutional:  Negative for chills and fever.   Respiratory:  Negative for cough and shortness of breath.    Cardiovascular:  Negative for chest pain and palpitations.   Gastrointestinal:  Negative for abdominal pain, nausea and  vomiting.   Musculoskeletal:  Positive for falls. Negative for joint pain and myalgias.   Neurological:  Negative for dizziness and headaches.   Psychiatric/Behavioral:  Positive for hallucinations. The patient is nervous/anxious.         Physical Exam  Temp:  [36.3 °C (97.3 °F)-36.7 °C (98.1 °F)] 36.7 °C (98.1 °F)  Pulse:  [55-63] 62  Resp:  [14-18] 16  BP: (118-146)/(53-84) 129/68  SpO2:  [90 %-99 %] 90 %    Physical Exam  Vitals and nursing note reviewed. Exam conducted with a chaperone present.   Constitutional:       General: He is not in acute distress.     Appearance: He is ill-appearing.   HENT:      Head: Normocephalic and atraumatic.      Mouth/Throat:      Mouth: Mucous membranes are moist.      Pharynx: Oropharynx is clear. No oropharyngeal exudate.      Comments: Abrasion to the left upper lip and laceration to the inner gum.  No active bleeding.  Eyes:      General: No scleral icterus.        Right eye: No discharge.         Left eye: No discharge.      Conjunctiva/sclera: Conjunctivae normal.   Cardiovascular:      Rate and Rhythm: Normal rate and regular rhythm.      Pulses: Normal pulses.      Heart sounds: Normal heart sounds. No murmur heard.  Pulmonary:      Effort: Pulmonary effort is normal. No respiratory distress.      Breath sounds: Normal breath sounds.   Abdominal:      General: Abdomen is flat. Bowel sounds are normal. There is no distension.      Palpations: Abdomen is soft.   Musculoskeletal:         General: No swelling.      Cervical back: Neck supple. No tenderness.      Right lower leg: No edema.      Left lower leg: No edema.   Skin:     General: Skin is warm and dry.      Coloration: Skin is pale.   Neurological:      Mental Status: He is alert and oriented to person, place, and time. Mental status is at baseline.      Motor: No weakness.   Psychiatric:         Attention and Perception: Attention normal.         Mood and Affect: Mood is anxious. Affect is blunt and flat.          Speech: Speech normal.         Behavior: Behavior normal. Behavior is cooperative.         Thought Content: Thought content is paranoid.         Cognition and Memory: Cognition normal.         Judgment: Judgment normal.         Fluids    Intake/Output Summary (Last 24 hours) at 9/28/2024 1430  Last data filed at 9/28/2024 0000  Gross per 24 hour   Intake 800 ml   Output 0 ml   Net 800 ml        Laboratory  Recent Labs     09/26/24  1509 09/27/24  0256   WBC 6.1 6.9   RBC 4.95 4.65*   HEMOGLOBIN 13.7* 13.3*   HEMATOCRIT 42.5 40.1*   MCV 85.9 86.2   MCH 27.7 28.6   MCHC 32.2* 33.2   RDW 40.2 40.8   PLATELETCT 268 249   MPV 10.4 10.3     Recent Labs     09/26/24  1509 09/27/24  0256   SODIUM 138 141   POTASSIUM 4.1 4.0   CHLORIDE 103 107   CO2 22 23   GLUCOSE 85 90   BUN 20 20   CREATININE 1.13 0.99   CALCIUM 10.0 9.7                   Imaging  CT-HEAD W/O   Final Result      Normal CT of the head without IV contrast.               DX-PELVIS-1 OR 2 VIEWS   Final Result      1. No acute post traumatic imaging findings.   2. Moderate degenerative changes involving the sacroiliac joints, greater than expected for the age of the patient.      DX-CHEST-PORTABLE (1 VIEW)   Final Result      No acute cardiac or pulmonary abnormalities are identified.           Assessment/Plan  * Encephalopathy- (present on admission)  Assessment & Plan  Differential diagnosis may include polypharmacy and side effects of his psychiatric medications, given added Thorazine 2 days ago, and catatonic state, non convulsive seizure  CT head without contrast is negative  Prolactin was noted is elevated which is nonspecific, and could be elevated as a side effect of neuroleptics, as well as after seizures  CPK is elevated that could be result of muscle injury or again side effects of antipsychotics.  However there is no increased muscle tone on exam  Plan: Evaluate patient with EEG.  Check ammonia, TSH, vitamin B12, lithium level  Consider psychiatric  consultation for medications management given concern for side effect and polypharmacy    9/27/2024  Appears to be improving.  Alert and oriented x3.  States he from [place] and in [place] due to psychiatric issues.  Not sure if he wants to go back to [facility].  Agreeable to EEG.  Discussed with EEG technician.  Discussed with Dr. Rushing of psychiatry.    9/28/2024  Alert and orient x 3.  Had a ground-level fall after staring episode.  He was just taken off telemetry monitoring.  I discussed with the EEG technician about obtaining urgent EEG.    Ground-level fall- (present on admission)  Assessment & Plan  9/28/2024  Occurred today following staring episode.  It is face onto the foot of the bed resulting in laceration of his upper gum and abrasion to the left upper lip.  Resuming continuous cardiac monitoring to monitor for arrhythmias    Psychosis (HCC)- (present on admission)  Assessment & Plan  9/27/2024  Severe paranoid vs auditory hallucinations.  Psychiatry recommendations reviewed and implemented.  Increasing lithium to 900 mg QHS  Changing olanzapine to 10 mg PO BID. Plan to taper.  Hydroxyzine as needed for anxiety.  Ativan as needed for breakthrough anxiety.     9/28/2024  Continue current medications  Receiving Ativan as needed yesterday along with Droxia seen and Benadryl for sleep, which improved his sleep.    Marijuana use- (present on admission)  Assessment & Plan  9/28/2024  Urine drug screen positive for cannabinoids.  May be contributing to anxiety and paranoia.    Insomnia due to other mental disorder- (present on admission)  Assessment & Plan  9/28/2024  Ongoing insomnia.  Continue Benadryl as needed at bedtime  Meditative breathing techniques and restoration of circadian rhythm encouraged.    Anxiety- (present on admission)  Assessment & Plan  9/27/2024  Continue hydroxyzine as needed.  Ativan as needed for breakthrough anxiety.    9/28/2024  Continue hydroxyzine and  Ativan    Hypothyroidism  Assessment & Plan  Continue levothyroxine  Recheck TSH    9/28/2024  TSH of 2.08.  Within normal limits.  Continue levothyroxine 112 mcg daily.    Hypertension  Assessment & Plan  Continue losartan  Monitor blood pressure    9/27/2024  Stable.  Continue losartan 25 mg daily    Rhabdomyolysis  Assessment & Plan  Traumatic versus nontraumatic  IV hydration  Repeat CPK  If goes up, consider holding antipsychotics.  Check lithium level    9/27/2024  Improving to 437  Check level tomorrow  Question whether related to seizure.    Suicidal ideations  Assessment & Plan  Patient is vague in regards to questioning for suicidal ideations  He has been on legal hold  Will put suicidal precautions    9/27/2024  Continues with one to one sitter with suicide precautions.    Syncope  Assessment & Plan  Suspected side effect of antipsychotics, causing ineffective postural vascular tone reflex, orthostatic hypotension, orthostatic syncope  EKG does not show QTc prolongation  Will monitor on telemetry for arrhythmia  And give bolus of saline 1 L  Orthostatic vitals    9/27/2024  Continue telemetry monitoring.    9/28/2024  Patient was discontinued telemetry after which he had a ground-level fall following a staring episode.  I have reinitiated continuous telemetry monitoring.  Does not appear to be cardiogenic in nature.  Suspect possible seizures.  EEG has been ordered.         VTE prophylaxis:    enoxaparin ppx      I have performed a physical exam and reviewed and updated ROS and Plan today (9/28/2024). In review of yesterday's note (9/27/2024), there are no changes except as documented above.      Greater than 55 minutes spent prepping to see patient (e.g. review of tests) obtaining and/or reviewing separately obtained history. Performing a medically appropriate examination and evaluation.  Counseling and educating the patient/family/caregiver.  Ordering medications, tests, or procedures.  Referring and  communicating with other health care professionals.  Documenting clinical information in EPIC.  Independently interpreting results and communicating results to patient/family/caregiver.  Care coordination.  Patient was seen today on 2 separate occasions at bedside.

## 2024-09-28 NOTE — ASSESSMENT & PLAN NOTE
9/28/2024  Ongoing insomnia.  Continue Benadryl as needed at bedtime  Meditative breathing techniques and restoration of circadian rhythm encouraged.    9/30/2024  Ordering melatonin 5 mg at bedtime as needed

## 2024-09-28 NOTE — CARE PLAN
The patient is Watcher - Medium risk of patient condition declining or worsening    Shift Goals  Clinical Goals: safety, reduce anxiety  Patient Goals: reduce anxiety  Family Goals: Stay updated on POC and come in to visiti    Progress made toward(s) clinical / shift goals:    Problem: Knowledge Deficit - Standard  Goal: Patient and family/care givers will demonstrate understanding of plan of care, disease process/condition, diagnostic tests and medications  Outcome: Progressing     Problem: Pain - Standard  Goal: Alleviation of pain or a reduction in pain to the patient’s comfort goal  Outcome: Progressing     Problem: Depression  Goal: Patient and family/caregiver will verbalize accurate information about at least two of the possible causes of depression, three-four of the signs and symptoms of depression  Outcome: Progressing     Problem: Safety:  Goal: Will remain free from injury  Outcome: Progressing. 1:1 sitter at bedside      Problem: Psychosocial Needs:  Goal: Ability to identify and develop effective coping behavior will improve  Outcome: Progressing  Goal: Ability to verbalize positive feelings about self will improve  Outcome: Progressing     Problem: Health Behavior:  Goal: Ability to identify appropriate support needs will improve  Outcome: Progressing  Goal: Ability to identify and utilize available support systems will improve  Outcome: Progressing       Patient is not progressing towards the following goals:

## 2024-09-28 NOTE — ASSESSMENT & PLAN NOTE
9/28/2024  Urine drug screen positive for cannabinoids.  May be contributing to anxiety and paranoia.

## 2024-09-28 NOTE — PROGRESS NOTES
"Shift note    2000 Pt not having active hallucinations. Complaint w/ meds. A&Ox4.     0000 Pt found wandering unit, attempted to elope. Redirected back to room w/ 1:1 sitter. Wandered unit again a few minutes later. Sat in chair in hallway for a few minutes before he was agreeable to return to his room. Endorsing auditory hallucinations at this time, stating the voices are telling him \"my life needs to end because I'm being accused of doing things to those kids\". Was able to redirect pt to his room and give the PRN ativan.     0045 pt removed his IV, stating \"I don't want this anymore\". Pt continues to endorse auditory hallucinations at this time saying \"God is telling me that I need to be buried whole with all of my organs.\" HAROON De Dios notified that IV was removed and IVFs had to be paused.    0130 New IV placed. IVF restarted. Pt educated that he is here on a hold and has to comply w/ care plan. IV ativan ordered and administered.   "

## 2024-09-29 PROBLEM — R56.9 SEIZURE (HCC): Status: ACTIVE | Noted: 2024-09-29

## 2024-09-29 PROBLEM — S00.511A: Status: ACTIVE | Noted: 2024-09-29

## 2024-09-29 PROCEDURE — A9270 NON-COVERED ITEM OR SERVICE: HCPCS | Performed by: STUDENT IN AN ORGANIZED HEALTH CARE EDUCATION/TRAINING PROGRAM

## 2024-09-29 PROCEDURE — G0378 HOSPITAL OBSERVATION PER HR: HCPCS

## 2024-09-29 PROCEDURE — A9270 NON-COVERED ITEM OR SERVICE: HCPCS | Performed by: INTERNAL MEDICINE

## 2024-09-29 PROCEDURE — 700102 HCHG RX REV CODE 250 W/ 637 OVERRIDE(OP): Performed by: INTERNAL MEDICINE

## 2024-09-29 PROCEDURE — 96376 TX/PRO/DX INJ SAME DRUG ADON: CPT

## 2024-09-29 PROCEDURE — 99233 SBSQ HOSP IP/OBS HIGH 50: CPT | Performed by: STUDENT IN AN ORGANIZED HEALTH CARE EDUCATION/TRAINING PROGRAM

## 2024-09-29 PROCEDURE — 700102 HCHG RX REV CODE 250 W/ 637 OVERRIDE(OP): Performed by: STUDENT IN AN ORGANIZED HEALTH CARE EDUCATION/TRAINING PROGRAM

## 2024-09-29 PROCEDURE — 700111 HCHG RX REV CODE 636 W/ 250 OVERRIDE (IP): Mod: JZ | Performed by: INTERNAL MEDICINE

## 2024-09-29 RX ORDER — BACITRACIN ZINC 500 [USP'U]/G
OINTMENT TOPICAL 2 TIMES DAILY
Status: DISPENSED | OUTPATIENT
Start: 2024-09-29 | End: 2024-10-01

## 2024-09-29 RX ADMIN — LOSARTAN POTASSIUM 25 MG: 25 TABLET, FILM COATED ORAL at 06:30

## 2024-09-29 RX ADMIN — LORAZEPAM 2 MG: 2 TABLET ORAL at 17:44

## 2024-09-29 RX ADMIN — GEMFIBROZIL 600 MG: 600 TABLET ORAL at 09:56

## 2024-09-29 RX ADMIN — LITHIUM CARBONATE 900 MG: 300 TABLET ORAL at 21:16

## 2024-09-29 RX ADMIN — LURASIDONE HYDROCHLORIDE 120 MG: 40 TABLET, FILM COATED ORAL at 21:16

## 2024-09-29 RX ADMIN — GEMFIBROZIL 600 MG: 600 TABLET ORAL at 21:16

## 2024-09-29 RX ADMIN — LEVOTHYROXINE SODIUM 112 MCG: 0.11 TABLET ORAL at 06:27

## 2024-09-29 RX ADMIN — OLANZAPINE 10 MG: 5 TABLET, FILM COATED ORAL at 17:44

## 2024-09-29 RX ADMIN — DIPHENHYDRAMINE HYDROCHLORIDE 25 MG: 50 INJECTION, SOLUTION INTRAMUSCULAR; INTRAVENOUS at 21:16

## 2024-09-29 RX ADMIN — OLANZAPINE 10 MG: 5 TABLET, FILM COATED ORAL at 06:29

## 2024-09-29 RX ADMIN — SENNOSIDES AND DOCUSATE SODIUM 2 TABLET: 50; 8.6 TABLET ORAL at 17:44

## 2024-09-29 RX ADMIN — BACITRACIN ZINC: 500 OINTMENT TOPICAL at 13:51

## 2024-09-29 ASSESSMENT — ENCOUNTER SYMPTOMS
DIZZINESS: 0
ABDOMINAL PAIN: 0
COUGH: 0
PALPITATIONS: 0
FEVER: 0
VOMITING: 0
FALLS: 0
MYALGIAS: 0
HEADACHES: 0
HALLUCINATIONS: 0
NAUSEA: 0
SHORTNESS OF BREATH: 0
CHILLS: 0
NERVOUS/ANXIOUS: 1

## 2024-09-29 ASSESSMENT — PATIENT HEALTH QUESTIONNAIRE - PHQ9
9. THOUGHTS THAT YOU WOULD BE BETTER OFF DEAD, OR OF HURTING YOURSELF: NEARLY EVERY DAY
4. FEELING TIRED OR HAVING LITTLE ENERGY: NOT AT ALL
SUM OF ALL RESPONSES TO PHQ9 QUESTIONS 1 AND 2: 5
6. FEELING BAD ABOUT YOURSELF - OR THAT YOU ARE A FAILURE OR HAVE LET YOURSELF OR YOUR FAMILY DOWN: NEARLY EVERY DAY
5. POOR APPETITE OR OVEREATING: SEVERAL DAYS
2. FEELING DOWN, DEPRESSED, IRRITABLE, OR HOPELESS: NEARLY EVERY DAY
SUM OF ALL RESPONSES TO PHQ QUESTIONS 1-9: 18
1. LITTLE INTEREST OR PLEASURE IN DOING THINGS: MORE THAN HALF THE DAYS
8. MOVING OR SPEAKING SO SLOWLY THAT OTHER PEOPLE COULD HAVE NOTICED. OR THE OPPOSITE, BEING SO FIGETY OR RESTLESS THAT YOU HAVE BEEN MOVING AROUND A LOT MORE THAN USUAL: SEVERAL DAYS
7. TROUBLE CONCENTRATING ON THINGS, SUCH AS READING THE NEWSPAPER OR WATCHING TELEVISION: MORE THAN HALF THE DAYS
3. TROUBLE FALLING OR STAYING ASLEEP OR SLEEPING TOO MUCH: NEARLY EVERY DAY

## 2024-09-29 ASSESSMENT — PAIN DESCRIPTION - PAIN TYPE
TYPE: ACUTE PAIN;CHRONIC PAIN

## 2024-09-29 ASSESSMENT — FIBROSIS 4 INDEX: FIB4 SCORE: 1.05

## 2024-09-29 NOTE — ASSESSMENT & PLAN NOTE
9/29/2024  Seizure versus pseudoseizure.  Prolactin is elevated suggestive of a actual seizure.  CPK was also elevated however can be related to fall.  Spot EEG was negative  I have ordered 24-hour continuous EEG monitoring, patient is agreeable.    9/30/2024  Undergoing 24-hour EEG monitoring.

## 2024-09-29 NOTE — CARE PLAN
Problem: Knowledge Deficit - Standard  Goal: Patient and family/care givers will demonstrate understanding of plan of care, disease process/condition, diagnostic tests and medications  Description: Target End Date:  1-3 days or as soon as patient condition allows    Document in Patient Education    1.  Patient and family/caregiver oriented to unit, equipment, visitation policy and means for communicating concern  2.  Complete/review Learning Assessment  3.  Assess knowledge level of disease process/condition, treatment plan, diagnostic tests and medications  4.  Explain disease process/condition, treatment plan, diagnostic tests and medications  Outcome: Progressing     Problem: Pain - Standard  Goal: Alleviation of pain or a reduction in pain to the patient’s comfort goal  Description: Target End Date:  Prior to discharge or change in level of care    Document on Vitals flowsheet    1.  Document pain using the appropriate pain scale per order or unit policy  2.  Educate and implement non-pharmacologic comfort measures (i.e. relaxation, distraction, massage, cold/heat therapy, etc.)  3.  Pain management medications as ordered  4.  Reassess pain after pain med administration per policy  5.  If opiods administered assess patient's response to pain medication is appropriate per POSS sedation scale  6.  Follow pain management plan developed in collaboration with patient and interdisciplinary team (including palliative care or pain specialists if applicable)  Outcome: Progressing     Problem: Depression  Goal: Patient and family/caregiver will verbalize accurate information about at least two of the possible causes of depression, three-four of the signs and symptoms of depression  Description: Target End Date:  1 to 3 days    1.  Assess the patient's and family/caregiver's knowledge regarding depression and its causes.  2.  Explain to the patient and family/caregiver regarding the major symptoms of depression.  3.   Inform the patient and family/caregiver that depression can be treated through medications and psychotherapy.  4.  Allow the patient to express feelings and perceptions  5.  Express hope to the patient with realistic comments about the patient's strengths and resources.  5.  Give positive feedback after a tasks are achieved.  6.  Encourage identification of positive aspects of self.  7.  Educate the patient about crisis intervention services such as suicide hotlines and other resources.  Outcome: Progressing     Problem: Safety:  Goal: Will remain free from injury  Outcome: Progressing     Problem: Psychosocial Needs:  Goal: Ability to identify and develop effective coping behavior will improve  Outcome: Progressing  Goal: Ability to verbalize positive feelings about self will improve  Outcome: Progressing     Problem: Health Behavior:  Goal: Ability to identify appropriate support needs will improve  Outcome: Progressing  Goal: Ability to identify and utilize available support systems will improve  Outcome: Progressing     The patient is Watcher - Medium risk of patient condition declining or worsening    Shift Goals  Clinical Goals: safety  Patient Goals: speak to doctor  Family Goals: updates    Progress made toward(s) clinical / shift goals:  Patient resting comfortably.    Patient is not progressing towards the following goals: Needs education reenforcement on plan of care.

## 2024-09-29 NOTE — PROGRESS NOTES
Hospital Medicine Daily Progress Note    Date of Service  9/29/2024    Chief Complaint  Lee Yadav is a 39 y.o. male admitted 9/26/2024 with altered mental status with concern for seizure.    Hospital Course  Lee Yadav is a 39 y.o. male with history of bipolar disorder who presented 9/26/2024 from psychiatric facility [facility], where he has been hospitalized for his psychiatric illness, with concern for periods of altered mentation in the last 2 days.  Per ERP Dr. Bentley who spoke to MD at psychiatric facility, patient would have.  When he becomes nonverbal and nonresponsive, looking with a blank stare, will track but would not respond to questions.  Patient himself states that he had several orthostatic syncopal episode, most recently earlier today when he was feeling dizzy, passed out and fell hitting his right hip.  Currently he reports some hip pain.  Reportedly he was started on Thorazine 2 days ago  Denies any GI illness.  In ER vital stable  Prolactin was checked and elevated at 38  CPK elevated at 528.  Alcohol was not elevated.  UDS: Cannabinoids, benzodiazepines.  UA is negative.  VBG is unremarkable.  CT head without contrast negative.  Pelvic x-ray: Degenerative changes in the sacroiliac joints.  EKG: Sinus rhythm heart rate 69, no significant T/ST fluctuations, QTc is not prolonged.     Multiple psychiatric medications noted in the patient outpatient medications list: Thorazine, lithium, Latuda, olanzapine, trazodone    Interval Problem Update  9/27/2024  Patient was seen and examined on the telemetry floor.  He continues on psychiatric precautions.  Parents and sister at bedside.  He is alert and orient x 3.  States that he hears other people outside the room talking about him suggestive of paranoia.    He declined EEG twice this morning.  I discussed this with the EEG technician.  I explained to the patient the importance of obtaining an EEG to figure out what is going on.  And the  patient agreed to proceed.  I communicated this with the EEG technician, will reattempt EEG.  I discussed case with Dr. Rushing of psychiatry, who states that this is a very complicated psychiatric case.  Patient is on multiple psychiatric medications.  We need to use Ativan to calm patient's paranoia in order to perform EEG.    9/28/2024  Patient was seen and examined on the telemetry floor.  Continues on suicide precautions.  Patient was seen on 2 separate occasions last following a ground-level fall.  States that he was up and walking and incidentally fell hitting his face onto the foot of the bed.  Had some bleeding in the upper lip and inner gum, which has subsided.  Denied preceding dizziness prior to the fall.  Witnessed by sitter, patient had a staring episode prior to the fall.  He is alert and orient x 3.  Discussed with EEG technician.  I have reordered EEG.  Earlier today, he was complaining of intermittent anxiety.  Continue to have depression but denying any suicidal ideation.  Stating that he would like to be buried with his body intact.  Assured him that he was not dying.  Denying any visual hallucinations.  Still believes that people are talking about him outside the room at times.    9/29/2024  Patient was seen and examined on the telemetry floor.  Continues on suicide precautions.  Patient continues to have some paranoid thoughts but states that is improving.  He is requesting to go outside.  I have given permission with security accompanying.  Spot EEG was negative for seizure activity.  He is agreeable to 24-hour EEG monitoring, which I have ordered.      I have discussed this patient's plan of care and discharge plan at IDT rounds today with Case Management, Nursing, Nursing leadership, and other members of the IDT team.    Consultants/Specialty  psychiatry    Code Status  Full Code    Disposition  The patient is not medically cleared for discharge to home or a post-acute facility.  Anticipate  discharge to: a psychiatric hospital    I have placed the appropriate orders for post-discharge needs.    Review of Systems  Review of Systems   Constitutional:  Negative for chills and fever.   Respiratory:  Negative for cough and shortness of breath.    Cardiovascular:  Negative for chest pain and palpitations.   Gastrointestinal:  Negative for abdominal pain, nausea and vomiting.   Musculoskeletal:  Negative for falls, joint pain and myalgias.   Neurological:  Negative for dizziness and headaches.   Psychiatric/Behavioral:  Negative for hallucinations. The patient is nervous/anxious.         Physical Exam  Temp:  [36.4 °C (97.5 °F)-36.5 °C (97.7 °F)] 36.5 °C (97.7 °F)  Pulse:  [57-81] 57  Resp:  [15-18] 15  BP: (107-133)/(60-75) 119/68  SpO2:  [95 %-97 %] 97 %    Physical Exam  Vitals and nursing note reviewed. Exam conducted with a chaperone present.   Constitutional:       General: He is not in acute distress.     Appearance: He is ill-appearing.   HENT:      Head: Normocephalic and atraumatic.      Mouth/Throat:      Mouth: Mucous membranes are moist.      Pharynx: Oropharynx is clear. No oropharyngeal exudate.      Comments: Healing abrasion to the left upper lip  Eyes:      General: No scleral icterus.        Right eye: No discharge.         Left eye: No discharge.      Conjunctiva/sclera: Conjunctivae normal.   Cardiovascular:      Rate and Rhythm: Normal rate and regular rhythm.      Pulses: Normal pulses.      Heart sounds: Normal heart sounds. No murmur heard.  Pulmonary:      Effort: Pulmonary effort is normal. No respiratory distress.      Breath sounds: Normal breath sounds.   Abdominal:      General: Abdomen is flat. Bowel sounds are normal. There is no distension.      Palpations: Abdomen is soft.   Musculoskeletal:         General: No swelling.      Cervical back: Neck supple. No tenderness.      Right lower leg: No edema.      Left lower leg: No edema.   Skin:     General: Skin is warm and dry.       Coloration: Skin is pale.   Neurological:      Mental Status: He is alert and oriented to person, place, and time. Mental status is at baseline.      Motor: No weakness.   Psychiatric:         Attention and Perception: Attention normal.         Mood and Affect: Mood is anxious. Affect is blunt and flat.         Speech: Speech normal.         Behavior: Behavior normal. Behavior is cooperative.         Thought Content: Thought content is paranoid.         Cognition and Memory: Cognition normal.         Judgment: Judgment normal.         Fluids    Intake/Output Summary (Last 24 hours) at 9/29/2024 1547  Last data filed at 9/29/2024 1000  Gross per 24 hour   Intake 800 ml   Output --   Net 800 ml        Laboratory  Recent Labs     09/27/24  0256   WBC 6.9   RBC 4.65*   HEMOGLOBIN 13.3*   HEMATOCRIT 40.1*   MCV 86.2   MCH 28.6   MCHC 33.2   RDW 40.8   PLATELETCT 249   MPV 10.3     Recent Labs     09/27/24  0256   SODIUM 141   POTASSIUM 4.0   CHLORIDE 107   CO2 23   GLUCOSE 90   BUN 20   CREATININE 0.99   CALCIUM 9.7                   Imaging  CT-HEAD W/O   Final Result      Normal CT of the head without IV contrast.               DX-PELVIS-1 OR 2 VIEWS   Final Result      1. No acute post traumatic imaging findings.   2. Moderate degenerative changes involving the sacroiliac joints, greater than expected for the age of the patient.      DX-CHEST-PORTABLE (1 VIEW)   Final Result      No acute cardiac or pulmonary abnormalities are identified.           Assessment/Plan  * Seizure (HCC)- (present on admission)  Assessment & Plan  9/29/2024  Seizure versus pseudoseizure.  Prolactin is elevated suggestive of a actual seizure.  CPK was also elevated however can be related to fall.  Spot EEG was negative  I have ordered 24-hour EEG monitoring, patient is agreeable.    Ground-level fall- (present on admission)  Assessment & Plan  9/28/2024  Occurred today following staring episode.  It is face onto the foot of the bed  resulting in laceration of his upper gum and abrasion to the left upper lip.  Resuming continuous cardiac monitoring to monitor for arrhythmias    Psychosis (HCC)- (present on admission)  Assessment & Plan  9/27/2024  Severe paranoid vs auditory hallucinations.  Psychiatry recommendations reviewed and implemented.  Increasing lithium to 900 mg QHS  Changing olanzapine to 10 mg PO BID. Plan to taper.  Hydroxyzine as needed for anxiety.  Ativan as needed for breakthrough anxiety.     9/28/2024  Continue current medications  Receiving Ativan as needed yesterday along with Droxia seen and Benadryl for sleep, which improved his sleep.    9/29/2024  Appears to be improving on current medications.    Abrasion of skin of lip- (present on admission)  Assessment & Plan  9/29/2024  Secondary to ground-level fall in the hospital.  I ordered bacitracin    Marijuana use- (present on admission)  Assessment & Plan  9/28/2024  Urine drug screen positive for cannabinoids.  May be contributing to anxiety and paranoia.    Insomnia due to other mental disorder- (present on admission)  Assessment & Plan  9/28/2024  Ongoing insomnia.  Continue Benadryl as needed at bedtime  Meditative breathing techniques and restoration of circadian rhythm encouraged.    Anxiety- (present on admission)  Assessment & Plan  9/27/2024  Continue hydroxyzine as needed.  Ativan as needed for breakthrough anxiety.    9/28/2024  Continue hydroxyzine and Ativan    Hypothyroidism  Assessment & Plan  Continue levothyroxine  Recheck TSH    9/28/2024  TSH of 2.08.  Within normal limits.  Continue levothyroxine 112 mcg daily.    Hypertension  Assessment & Plan  Continue losartan  Monitor blood pressure    9/27/2024  Stable.  Continue losartan 25 mg daily    Rhabdomyolysis  Assessment & Plan  Traumatic versus nontraumatic  IV hydration  Repeat CPK  If goes up, consider holding antipsychotics.  Check lithium level    9/27/2024  Improving to 437  Check level  tomorrow  Question whether related to seizure.    Suicidal ideations  Assessment & Plan  Patient is vague in regards to questioning for suicidal ideations  He has been on legal hold  Will put suicidal precautions    9/27/2024  Continues with one to one sitter with suicide precautions.    9/29/2024  Continues on one-to-one sitter with suicide precautions.    Syncope  Assessment & Plan  Suspected side effect of antipsychotics, causing ineffective postural vascular tone reflex, orthostatic hypotension, orthostatic syncope  EKG does not show QTc prolongation  Will monitor on telemetry for arrhythmia  And give bolus of saline 1 L  Orthostatic vitals    9/27/2024  Continue telemetry monitoring.    9/28/2024  Patient was discontinued telemetry after which he had a ground-level fall following a staring episode.  I have reinitiated continuous telemetry monitoring.  Does not appear to be cardiogenic in nature.  Suspect possible seizures.  EEG has been ordered.    9/29/2024  No further episodes.  Continue continuous cardiac monitoring  Spot EEG was negative  I have ordered 24-hour continuous EEG monitoring    Encephalopathy- (present on admission)  Assessment & Plan  Differential diagnosis may include polypharmacy and side effects of his psychiatric medications, given added Thorazine 2 days ago, and catatonic state, non convulsive seizure  CT head without contrast is negative  Prolactin was noted is elevated which is nonspecific, and could be elevated as a side effect of neuroleptics, as well as after seizures  CPK is elevated that could be result of muscle injury or again side effects of antipsychotics.  However there is no increased muscle tone on exam  Plan: Evaluate patient with EEG.  Check ammonia, TSH, vitamin B12, lithium level  Consider psychiatric consultation for medications management given concern for side effect and polypharmacy    9/27/2024  Appears to be improving.  Alert and oriented x3.  States he from [place]  and in [place] due to psychiatric issues.  Not sure if he wants to go back to [facility].  Agreeable to EEG.  Discussed with EEG technician.  Discussed with Dr. Rushing of psychiatry.    9/28/2024  Alert and orient x 3.  Had a ground-level fall after staring episode.  He was just taken off telemetry monitoring.  I discussed with the EEG technician about obtaining urgent EEG.    9/29/2024  Alert and orient x 3.  Improving.         VTE prophylaxis:    enoxaparin ppx      I have performed a physical exam and reviewed and updated ROS and Plan today (9/29/2024). In review of yesterday's note (9/28/2024), there are no changes except as documented above.

## 2024-09-30 VITALS
HEART RATE: 59 BPM | DIASTOLIC BLOOD PRESSURE: 76 MMHG | BODY MASS INDEX: 26.74 KG/M2 | HEIGHT: 76 IN | RESPIRATION RATE: 18 BRPM | SYSTOLIC BLOOD PRESSURE: 134 MMHG | TEMPERATURE: 97.2 F | OXYGEN SATURATION: 93 % | WEIGHT: 219.58 LBS

## 2024-09-30 PROCEDURE — 700102 HCHG RX REV CODE 250 W/ 637 OVERRIDE(OP): Performed by: INTERNAL MEDICINE

## 2024-09-30 PROCEDURE — 700102 HCHG RX REV CODE 250 W/ 637 OVERRIDE(OP): Performed by: STUDENT IN AN ORGANIZED HEALTH CARE EDUCATION/TRAINING PROGRAM

## 2024-09-30 PROCEDURE — A9270 NON-COVERED ITEM OR SERVICE: HCPCS | Performed by: STUDENT IN AN ORGANIZED HEALTH CARE EDUCATION/TRAINING PROGRAM

## 2024-09-30 PROCEDURE — 95700 EEG CONT REC W/VID EEG TECH: CPT | Performed by: PSYCHIATRY & NEUROLOGY

## 2024-09-30 PROCEDURE — 96376 TX/PRO/DX INJ SAME DRUG ADON: CPT

## 2024-09-30 PROCEDURE — A9270 NON-COVERED ITEM OR SERVICE: HCPCS | Performed by: INTERNAL MEDICINE

## 2024-09-30 PROCEDURE — 95720 EEG PHY/QHP EA INCR W/VEEG: CPT | Performed by: PSYCHIATRY & NEUROLOGY

## 2024-09-30 PROCEDURE — 700111 HCHG RX REV CODE 636 W/ 250 OVERRIDE (IP): Mod: UD

## 2024-09-30 PROCEDURE — 99232 SBSQ HOSP IP/OBS MODERATE 35: CPT | Mod: GC | Performed by: STUDENT IN AN ORGANIZED HEALTH CARE EDUCATION/TRAINING PROGRAM

## 2024-09-30 PROCEDURE — 95714 VEEG EA 12-26 HR UNMNTR: CPT | Performed by: PSYCHIATRY & NEUROLOGY

## 2024-09-30 PROCEDURE — 99233 SBSQ HOSP IP/OBS HIGH 50: CPT | Performed by: STUDENT IN AN ORGANIZED HEALTH CARE EDUCATION/TRAINING PROGRAM

## 2024-09-30 PROCEDURE — G0378 HOSPITAL OBSERVATION PER HR: HCPCS

## 2024-09-30 RX ORDER — LORAZEPAM 1 MG/1
1 TABLET ORAL EVERY 6 HOURS PRN
Status: DISCONTINUED | OUTPATIENT
Start: 2024-09-30 | End: 2024-10-03

## 2024-09-30 RX ORDER — LORAZEPAM 2 MG/ML
0.5 INJECTION INTRAMUSCULAR ONCE
Status: COMPLETED | OUTPATIENT
Start: 2024-09-30 | End: 2024-09-30

## 2024-09-30 RX ORDER — OLANZAPINE 5 MG/1
10 TABLET ORAL DAILY
Status: DISCONTINUED | OUTPATIENT
Start: 2024-10-01 | End: 2024-10-02

## 2024-09-30 RX ADMIN — OLANZAPINE 10 MG: 5 TABLET, FILM COATED ORAL at 16:21

## 2024-09-30 RX ADMIN — OLANZAPINE 10 MG: 5 TABLET, FILM COATED ORAL at 06:10

## 2024-09-30 RX ADMIN — BACITRACIN ZINC: 500 OINTMENT TOPICAL at 16:25

## 2024-09-30 RX ADMIN — GEMFIBROZIL 600 MG: 600 TABLET ORAL at 20:33

## 2024-09-30 RX ADMIN — Medication 5 MG: at 23:35

## 2024-09-30 RX ADMIN — HYDROXYZINE HYDROCHLORIDE 50 MG: 50 TABLET, FILM COATED ORAL at 20:33

## 2024-09-30 RX ADMIN — LITHIUM CARBONATE 900 MG: 300 TABLET ORAL at 20:34

## 2024-09-30 RX ADMIN — LOSARTAN POTASSIUM 25 MG: 25 TABLET, FILM COATED ORAL at 06:10

## 2024-09-30 RX ADMIN — LURASIDONE HYDROCHLORIDE 120 MG: 40 TABLET, FILM COATED ORAL at 20:34

## 2024-09-30 RX ADMIN — LORAZEPAM 0.5 MG: 2 INJECTION INTRAMUSCULAR; INTRAVENOUS at 01:29

## 2024-09-30 RX ADMIN — LORAZEPAM 1 MG: 1 TABLET ORAL at 23:35

## 2024-09-30 RX ADMIN — LEVOTHYROXINE SODIUM 112 MCG: 0.11 TABLET ORAL at 06:10

## 2024-09-30 RX ADMIN — HYDROXYZINE HYDROCHLORIDE 50 MG: 50 TABLET, FILM COATED ORAL at 01:39

## 2024-09-30 RX ADMIN — GEMFIBROZIL 600 MG: 600 TABLET ORAL at 09:36

## 2024-09-30 ASSESSMENT — ENCOUNTER SYMPTOMS
PALPITATIONS: 0
COUGH: 0
VOMITING: 0
FEVER: 0
INSOMNIA: 1
FALLS: 0
MYALGIAS: 0
HEADACHES: 0
DIZZINESS: 0
ABDOMINAL PAIN: 0
NERVOUS/ANXIOUS: 1
CHILLS: 0
NAUSEA: 0
HALLUCINATIONS: 0
SHORTNESS OF BREATH: 0

## 2024-09-30 ASSESSMENT — PAIN DESCRIPTION - PAIN TYPE: TYPE: ACUTE PAIN

## 2024-09-30 NOTE — CARE PLAN
The patient is Stable - Low risk of patient condition declining or worsening    Shift Goals  Clinical Goals: safety, L2K, monitor labs/tele  Patient Goals: leave  Family Goals: RITIKA    Progress made toward(s) clinical / shift goals:    Problem: Knowledge Deficit - Standard  Goal: Patient and family/care givers will demonstrate understanding of plan of care, disease process/condition, diagnostic tests and medications  Outcome: Progressing     Problem: Pain - Standard  Goal: Alleviation of pain or a reduction in pain to the patient’s comfort goal  Outcome: Progressing     Problem: Depression  Goal: Patient and family/caregiver will verbalize accurate information about at least two of the possible causes of depression, three-four of the signs and symptoms of depression  Outcome: Progressing     Problem: Safety:  Goal: Will remain free from injury  Outcome: Progressing     Problem: Psychosocial Needs:  Goal: Ability to identify and develop effective coping behavior will improve  Outcome: Progressing     Problem: Psychosocial Needs:  Goal: Ability to verbalize positive feelings about self will improve  Outcome: Progressing     Problem: Health Behavior:  Goal: Ability to identify appropriate support needs will improve  Outcome: Progressing     Problem: Fall Risk  Goal: Patient will remain free from falls  Outcome: Progressing
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
## 2024-09-30 NOTE — PROGRESS NOTES
Patient, followed by staff, exited [facility] 7 floor, into [facility] arriving at ground floor, where he was stopped by security at ambulance bay. Patient was returned to his room and placed in bilateral soft wrist restraints per APRN order.

## 2024-09-30 NOTE — EEG PROGRESS NOTE
Pt hooked up with CT compatible leads. Not MRI compatible.    - These leads can go to CT.  - These leads cannot go to MRI.    If pt is to rip off leads in the middle of the night. Nursing staff is to not call, on call tech to fix leads, approved by Epileptologist and acting supervisor.

## 2024-09-30 NOTE — CARE PLAN
Problem: Knowledge Deficit - Standard  Goal: Patient and family/care givers will demonstrate understanding of plan of care, disease process/condition, diagnostic tests and medications  Description: Target End Date:  1-3 days or as soon as patient condition allows    Document in Patient Education    1.  Patient and family/caregiver oriented to unit, equipment, visitation policy and means for communicating concern  2.  Complete/review Learning Assessment  3.  Assess knowledge level of disease process/condition, treatment plan, diagnostic tests and medications  4.  Explain disease process/condition, treatment plan, diagnostic tests and medications  9/30/2024 0348 by Vishnu Merritt R.BERHANE.  Outcome: Progressing  9/30/2024 0348 by Vishnu Merritt R.N.  Outcome: Progressing     Problem: Pain - Standard  Goal: Alleviation of pain or a reduction in pain to the patient’s comfort goal  Description: Target End Date:  Prior to discharge or change in level of care    Document on Vitals flowsheet    1.  Document pain using the appropriate pain scale per order or unit policy  2.  Educate and implement non-pharmacologic comfort measures (i.e. relaxation, distraction, massage, cold/heat therapy, etc.)  3.  Pain management medications as ordered  4.  Reassess pain after pain med administration per policy  5.  If opiods administered assess patient's response to pain medication is appropriate per POSS sedation scale  6.  Follow pain management plan developed in collaboration with patient and interdisciplinary team (including palliative care or pain specialists if applicable)  9/30/2024 0348 by Vishnu Merritt, R.N.  Outcome: Progressing  9/30/2024 0348 by Vishnu Merritt R.N.  Outcome: Progressing     Problem: Depression  Goal: Patient and family/caregiver will verbalize accurate information about at least two of the possible causes of depression, three-four of the signs and symptoms of depression  Description: Target End Date:  1 to 3  days    1.  Assess the patient's and family/caregiver's knowledge regarding depression and its causes.  2.  Explain to the patient and family/caregiver regarding the major symptoms of depression.  3.  Inform the patient and family/caregiver that depression can be treated through medications and psychotherapy.  4.  Allow the patient to express feelings and perceptions  5.  Express hope to the patient with realistic comments about the patient's strengths and resources.  5.  Give positive feedback after a tasks are achieved.  6.  Encourage identification of positive aspects of self.  7.  Educate the patient about crisis intervention services such as suicide hotlines and other resources.  9/30/2024 0348 by ADAM HolmanN.  Outcome: Progressing  9/30/2024 0348 by ADAM HolmanN.  Outcome: Progressing     Problem: Safety:  Goal: Will remain free from injury  9/30/2024 0348 by ADAM HolmanN.  Outcome: Progressing  9/30/2024 0348 by ADAM HolmanN.  Outcome: Progressing     Problem: Psychosocial Needs:  Goal: Ability to identify and develop effective coping behavior will improve  9/30/2024 0348 by Vishnu Merritt R.N.  Outcome: Progressing  9/30/2024 0348 by Vishnu Merritt R.N.  Outcome: Progressing  Goal: Ability to verbalize positive feelings about self will improve  9/30/2024 0348 by Vishnu Merritt R.N.  Outcome: Progressing  9/30/2024 0348 by Vishnu Merritt R.N.  Outcome: Progressing     Problem: Health Behavior:  Goal: Ability to identify appropriate support needs will improve  9/30/2024 0348 by Vishnu Merritt R.N.  Outcome: Progressing  9/30/2024 0348 by Vishnu Merritt R.N.  Outcome: Progressing  Goal: Ability to identify and utilize available support systems will improve  9/30/2024 0348 by ADAM HolmanN.  Outcome: Progressing  9/30/2024 0348 by Vishnu Merritt R.N.  Outcome: Progressing     Problem: Fall Risk  Goal: Patient will remain free from falls  Description: Target End Date:  Prior to discharge or  change in level of care    Document interventions on the [facility] Fall Risk Assessment    1.  Assess for fall risk factors  2.  Implement fall precautions  9/30/2024 0348 by Vishnu Merritt RJANNIE.  Outcome: Progressing  9/30/2024 0348 by Vishnu Merritt R.N.  Outcome: Progressing     Problem: Provide Safe Environment  Goal: Suicide environmental safety, protocols, policies, and practices will be implemented  Description: Target End Date:  resolve day 1    1.  Remove objects or personal belongings that may cause harm or injury to self or others  2.  Dietary tray modifications (paperware)  3.  Provide a safe environment  4.  Render close patient supervision by sustaining observation or awareness of the patient at all times  9/30/2024 0348 by Vishnu Merritt R.N.  Outcome: Progressing  9/30/2024 0348 by Vishnu Merritt R.N.  Outcome: Progressing     Problem: Psychosocial  Goal: Patient's ability to identify and develop effective coping behaviors will improve  Description: Target End Date:  1 to 3 days    1.  Present opportunities for the patient to express thoughts, and feelings in a nonjudgmental environment  2.  Help the patient with problem-solving in a constructive manner.  3.  Educate the patient on cognitive-behavioral self-management responses to suicidal thoughts.  4.  Introduce the use of self-expression methods to manage suicidal feelings  5.  Provide emotional support  6.  Encourage identification of positive aspects of self  9/30/2024 0348 by Vishnu Merritt R.N.  Outcome: Progressing  9/30/2024 0348 by Vishnu Merritt RLenoraNLenora  Outcome: Progressing  Goal: Patient's ability to identify and utilize available support systems will improve  Description: Target End Date:  1 to 3 days    1.  Help patient identify available resources and support systems  2.  Collaborate with interdisciplinary team  3.  Collaborate with patient, family/caregiver and other support systems  9/30/2024 0348 by Vishnu Merritt R.N.  Outcome:  Progressing  9/30/2024 0348 by Vishnu Merritt R.N.  Outcome: Progressing     Problem: Safety - Medical Restraint  Goal: Remains free of injury from restraints (Restraint for Interference with Medical Device)  Description: INTERVENTIONS:  1. Determine that other, less restrictive measures have been tried or would not be effective before applying the restraint  2. Evaluate the patient's condition at the time of restraint application  3. Educate patient/family regarding the reason for restraint  4. Q2H: Monitor safety, psychosocial status, comfort, circulation, respiratory status, LOC, nutrition and hydration  Outcome: Progressing  Goal: Free from restraint(s) (Restraint for Interference with Medical Device)  Description: INTERVENTIONS:  1.  ONCE/SHIFT or MINIMUM Q12H: Assess and document the continuing need for restraints  2.  Q24H: Continued use of restraint requires LIP to perform face to face examination and written order  3.  Identify and implement measures to help patient regain control  4.  Educate patient/family on discontinuation criteria   5.  Assess patient's understanding and retention of education provided  6.  Assess readiness for release & initiate progressive release per protocol  7.  Identify and document criteria for restraints  Outcome: Progressing     The patient is Watcher - Medium risk of patient condition declining or worsening    Shift Goals  Clinical Goals: VSS, safety, rest, legal hold  Patient Goals: To leave the hospital  Family Goals: updates      Patient is not progressing towards the following goals: Patient attempted to elope. Security notified and returned patient to room.

## 2024-09-30 NOTE — PROGRESS NOTES
"NOC HOSPITALIST CROSS COVER    Notified by RN regarding patient attempting to elope.  He was apparently walking the unit with his safety sitter, when he went down a stairwell.  He made it all the way to the ambulance bay where he was caught by security.  He is on a legal hold and will be transferred to a psychiatric hospital once he is medically cleared.  The patient was escorted back to his room by security.  He was repeatedly stating that he wanted to leave\" I have gotta get out of here.\"  Patient was placed in restraints due to repeated attempts to get out of bed and eloped.  He was also given a one-time dose of IV Ativan for agitation.  Nursing staff instructed that patient is not allowed to ambulate the unit without  due to this being his third attempt at elopement over the past few days.  He has an extremely high flight risk and should be closely monitored.      Vitals:    09/30/24 0446   BP: (!) 141/86   Pulse: 73   Resp: 17   Temp: 36.6 °C (97.9 °F)   SpO2: 98%     -----------------------------------------------------------------------------------------------------------    Electronically signed by:  Eliud De Dios, ALDAIR, HAROON, KEYSHAWN-BC  Hospitalist Services         "

## 2024-09-30 NOTE — CARE PLAN
Problem: Knowledge Deficit - Standard  Goal: Patient and family/care givers will demonstrate understanding of plan of care, disease process/condition, diagnostic tests and medications  Outcome: Progressing     Problem: Pain - Standard  Goal: Alleviation of pain or a reduction in pain to the patient’s comfort goal  Outcome: Progressing     Problem: Depression  Goal: Patient and family/caregiver will verbalize accurate information about at least two of the possible causes of depression, three-four of the signs and symptoms of depression  Outcome: Progressing     Problem: Safety:  Goal: Will remain free from injury  Outcome: Progressing     Problem: Psychosocial Needs:  Goal: Ability to identify and develop effective coping behavior will improve  Outcome: Progressing  Goal: Ability to verbalize positive feelings about self will improve  Outcome: Progressing     Problem: Health Behavior:  Goal: Ability to identify appropriate support needs will improve  Outcome: Progressing  Goal: Ability to identify and utilize available support systems will improve  Outcome: Progressing   The patient is Watcher - Medium risk of patient condition declining or worsening    Shift Goals  Clinical Goals: VSS, safety, rest, legal hold  Patient Goals: To leave the hospital  Family Goals: updates    Progress made toward(s) clinical / shift goals:  VSS, safety, rest, legal hold    Patient is not progressing towards the following goals:

## 2024-09-30 NOTE — PROCEDURES
VIDEO ELECTROENCEPHALOGRAM / EPILEPSY MONITORING UNIT REPORT      Referring provider: Dr. Wang    DOS:  9/30/2024      INDICATION:  Lee Yadav 39 y.o. male presenting with AMS    CURRENT ANTIEPILEPTIC REGIMEN: none      TECHNIQUE: A 30-channel, 20 hrs video electroencephalogram (VEEG) was performed in accordance with the international 10-20 system. This digital study was reviewed in bipolar and referential montages. The recording examined the patient during wakeful, drowsy and sleep states.     The EEG was set up and taken down by a Neurodiagnostic technologist who performed education to patient and staff.     A minimum but not limited to 23 electrodes and 23 channel recording was setup and performed by Neurodiagnostic technologist.    Impedances, electrode integrity, and technical impressions were documented a minimum of every 2-24 hour period by a Neurodiagnostic Technologist and reviewed by Interpreting physician.     ACTIVATION PROCEDURES:     None     DESCRIPTION OF THE RECORD:    During the awake state, background shows symmetrical 11 Hz alpha activity posteriorly with amplitude of 70 mV.  There was reactivity with eye opening/closure.  Normal anterior-posterior gradient was noted with faster beta frequencies seen anteriorly. During drowsiness, theta/delta frequencies were seen.    During the sleep state, background shows diffuse high-amplitude 4-5 Hz delta activity.  Symmetrical high-amplitude sleep spindles and vertex sharp activities were seen in the central leads.      ICTAL AND/OR INTERICTAL FINDINGS:   No focal or generalized epileptiform activity was noted. No regional slowing was seen during this study.  No seizures were recorded during the study.     EVENT(S):  none reported.     EKG: sampling review of EKG recording demonstrated sinus rhythm.       INTERPRETATION:     This is a normal 20 hours video electroencephalogram recording in the awake, drowsy and sleep state.  No events were captured  during the study. Clinical correlation is recommended.  Study was disconnected after patient pulled off all EEG wires.     Note: A normal electroencephalogram does not rule out epilepsy.       Jose Antonio Tan MD  Diplomate, American Board of Psychiatry and Neurology   Diplomate, American Board of Psychiatry and Neurology in Epilepsy

## 2024-09-30 NOTE — EEG PROGRESS NOTE
cEEG update at 7:50 am    Normal awake and sleep EEG for the first 30 minutes.     Formal report to follow,    Jose Antonio Tan MD

## 2024-09-30 NOTE — PROGRESS NOTES
Assessment completed.  Pt A&Ox4, pleasant.  Pt denies any pain at this time. 1:1 sitter at bedside. POC discussed; communication board updated.    Bed in locked, lowest position.  Needs met.

## 2024-09-30 NOTE — PROGRESS NOTES
Monitor Summary:    Patient is refusing cardiac monitoring at this point. Education provided based on safety concerns.

## 2024-10-01 ENCOUNTER — APPOINTMENT (OUTPATIENT)
Dept: CARDIOLOGY | Facility: MEDICAL CENTER | Age: 39
End: 2024-10-01
Attending: STUDENT IN AN ORGANIZED HEALTH CARE EDUCATION/TRAINING PROGRAM
Payer: MEDICAID

## 2024-10-01 LAB
D DIMER PPP IA.FEU-MCNC: <0.27 UG/ML (FEU) (ref 0–0.5)
FLUAV RNA SPEC QL NAA+PROBE: NEGATIVE
FLUBV RNA SPEC QL NAA+PROBE: NEGATIVE
LITHIUM SERPL-MCNC: 0.8 MMOL/L (ref 0.6–1.2)
LV EJECT FRACT  99904: 60
LV EJECT FRACT MOD 2C 99903: 63.52
LV EJECT FRACT MOD 4C 99902: 65.04
LV EJECT FRACT MOD BP 99901: 63.41
RSV RNA SPEC QL NAA+PROBE: NEGATIVE
SARS-COV-2 RNA RESP QL NAA+PROBE: NOTDETECTED
SPECIMEN SOURCE: NORMAL

## 2024-10-01 PROCEDURE — 700102 HCHG RX REV CODE 250 W/ 637 OVERRIDE(OP): Performed by: STUDENT IN AN ORGANIZED HEALTH CARE EDUCATION/TRAINING PROGRAM

## 2024-10-01 PROCEDURE — 700102 HCHG RX REV CODE 250 W/ 637 OVERRIDE(OP): Performed by: INTERNAL MEDICINE

## 2024-10-01 PROCEDURE — A9270 NON-COVERED ITEM OR SERVICE: HCPCS | Performed by: STUDENT IN AN ORGANIZED HEALTH CARE EDUCATION/TRAINING PROGRAM

## 2024-10-01 PROCEDURE — 99233 SBSQ HOSP IP/OBS HIGH 50: CPT | Performed by: STUDENT IN AN ORGANIZED HEALTH CARE EDUCATION/TRAINING PROGRAM

## 2024-10-01 PROCEDURE — 80178 ASSAY OF LITHIUM: CPT

## 2024-10-01 PROCEDURE — 93306 TTE W/DOPPLER COMPLETE: CPT

## 2024-10-01 PROCEDURE — G0378 HOSPITAL OBSERVATION PER HR: HCPCS

## 2024-10-01 PROCEDURE — 96372 THER/PROPH/DIAG INJ SC/IM: CPT | Mod: XU

## 2024-10-01 PROCEDURE — 700111 HCHG RX REV CODE 636 W/ 250 OVERRIDE (IP): Mod: JZ,UD | Performed by: INTERNAL MEDICINE

## 2024-10-01 PROCEDURE — 0241U HCHG SARS-COV-2 COVID-19 NFCT DS RESP RNA 4 TRGT MIC: CPT

## 2024-10-01 PROCEDURE — 85379 FIBRIN DEGRADATION QUANT: CPT

## 2024-10-01 PROCEDURE — 36415 COLL VENOUS BLD VENIPUNCTURE: CPT

## 2024-10-01 PROCEDURE — A9270 NON-COVERED ITEM OR SERVICE: HCPCS | Performed by: INTERNAL MEDICINE

## 2024-10-01 PROCEDURE — 93306 TTE W/DOPPLER COMPLETE: CPT | Mod: 26 | Performed by: INTERNAL MEDICINE

## 2024-10-01 RX ORDER — TEMAZEPAM 7.5 MG/1
7.5 CAPSULE ORAL
Status: DISCONTINUED | OUTPATIENT
Start: 2024-10-01 | End: 2024-10-02

## 2024-10-01 RX ADMIN — LURASIDONE HYDROCHLORIDE 120 MG: 40 TABLET, FILM COATED ORAL at 20:02

## 2024-10-01 RX ADMIN — LITHIUM CARBONATE 900 MG: 300 TABLET ORAL at 20:01

## 2024-10-01 RX ADMIN — LEVOTHYROXINE SODIUM 112 MCG: 0.11 TABLET ORAL at 05:39

## 2024-10-01 RX ADMIN — LOSARTAN POTASSIUM 25 MG: 25 TABLET, FILM COATED ORAL at 05:39

## 2024-10-01 RX ADMIN — LORAZEPAM 1 MG: 1 TABLET ORAL at 19:17

## 2024-10-01 RX ADMIN — BACITRACIN ZINC: 500 OINTMENT TOPICAL at 05:41

## 2024-10-01 RX ADMIN — HYDROXYZINE HYDROCHLORIDE 50 MG: 50 TABLET, FILM COATED ORAL at 04:39

## 2024-10-01 RX ADMIN — SENNOSIDES AND DOCUSATE SODIUM 2 TABLET: 50; 8.6 TABLET ORAL at 18:31

## 2024-10-01 RX ADMIN — ENOXAPARIN SODIUM 40 MG: 100 INJECTION SUBCUTANEOUS at 18:31

## 2024-10-01 RX ADMIN — GEMFIBROZIL 600 MG: 600 TABLET ORAL at 08:19

## 2024-10-01 RX ADMIN — GEMFIBROZIL 600 MG: 600 TABLET ORAL at 20:01

## 2024-10-01 RX ADMIN — OLANZAPINE 10 MG: 5 TABLET, FILM COATED ORAL at 05:39

## 2024-10-01 RX ADMIN — TEMAZEPAM 7.5 MG: 7.5 CAPSULE ORAL at 20:01

## 2024-10-01 ASSESSMENT — ENCOUNTER SYMPTOMS
NAUSEA: 0
SHORTNESS OF BREATH: 0
MYALGIAS: 0
CHILLS: 0
NERVOUS/ANXIOUS: 1
VOMITING: 0
HALLUCINATIONS: 0
COUGH: 0
DIZZINESS: 0
FALLS: 0
FEVER: 0
ABDOMINAL PAIN: 0
HEADACHES: 0
PALPITATIONS: 0

## 2024-10-01 ASSESSMENT — PAIN DESCRIPTION - PAIN TYPE
TYPE: ACUTE PAIN
TYPE: ACUTE PAIN

## 2024-10-01 NOTE — DISCHARGE PLANNING
Case Management Discharge Planning    Admission Date: 9/26/2024  GMLOS:    ALOS: 0    6-Clicks ADL Score: 24  6-Clicks Mobility Score: 24      Anticipated Discharge Dispo: Discharge Disposition: D/T to psych hosp or distinct part unit (65)    DME Needed: No    Action(s) Taken: Chart review completed. Patient discussed in am rounds.     Per provider, patient is MC to return to [facility]; RNCM reached out to Alert Team PAR via Voalte to provide update    Alert Team PAR to send referral to [facility]    1224: Per Alert Team PAR, [facility] requesting rapid COVID for patient; RNCM reached out to provider via Voalte to request    Escalations Completed: None    Medically Clear: Yes

## 2024-10-01 NOTE — CARE PLAN
Problem: Knowledge Deficit - Standard  Goal: Patient and family/care givers will demonstrate understanding of plan of care, disease process/condition, diagnostic tests and medications  Outcome: Progressing     Problem: Safety:  Goal: Will remain free from injury  Outcome: Progressing   The patient is Stable - Low risk of patient condition declining or worsening    Shift Goals  Clinical Goals: Inquire about need for EEG  Patient Goals: Go home  Family Goals: RITIKA    Progress made toward(s) clinical / shift goals:  Sitter at bedside monitoring safety.     Patient is not progressing towards the following goals: Pt requesting to ambulate in hallway, requiring observation to prevent elopement and/or fall.

## 2024-10-01 NOTE — CARE PLAN
Problem: Knowledge Deficit - Standard  Goal: Patient and family/care givers will demonstrate understanding of plan of care, disease process/condition, diagnostic tests and medications  Description: Target End Date:  1-3 days or as soon as patient condition allows    Document in Patient Education    1.  Patient and family/caregiver oriented to unit, equipment, visitation policy and means for communicating concern  2.  Complete/review Learning Assessment  3.  Assess knowledge level of disease process/condition, treatment plan, diagnostic tests and medications  4.  Explain disease process/condition, treatment plan, diagnostic tests and medications  Outcome: Progressing     Problem: Pain - Standard  Goal: Alleviation of pain or a reduction in pain to the patient’s comfort goal  Description: Target End Date:  Prior to discharge or change in level of care    Document on Vitals flowsheet    1.  Document pain using the appropriate pain scale per order or unit policy  2.  Educate and implement non-pharmacologic comfort measures (i.e. relaxation, distraction, massage, cold/heat therapy, etc.)  3.  Pain management medications as ordered  4.  Reassess pain after pain med administration per policy  5.  If opiods administered assess patient's response to pain medication is appropriate per POSS sedation scale  6.  Follow pain management plan developed in collaboration with patient and interdisciplinary team (including palliative care or pain specialists if applicable)  Outcome: Progressing     Problem: Depression  Goal: Patient and family/caregiver will verbalize accurate information about at least two of the possible causes of depression, three-four of the signs and symptoms of depression  Description: Target End Date:  1 to 3 days    1.  Assess the patient's and family/caregiver's knowledge regarding depression and its causes.  2.  Explain to the patient and family/caregiver regarding the major symptoms of depression.  3.   Inform the patient and family/caregiver that depression can be treated through medications and psychotherapy.  4.  Allow the patient to express feelings and perceptions  5.  Express hope to the patient with realistic comments about the patient's strengths and resources.  5.  Give positive feedback after a tasks are achieved.  6.  Encourage identification of positive aspects of self.  7.  Educate the patient about crisis intervention services such as suicide hotlines and other resources.  Outcome: Progressing     Problem: Safety:  Goal: Will remain free from injury  Outcome: Progressing     Problem: Psychosocial Needs:  Goal: Ability to identify and develop effective coping behavior will improve  Outcome: Progressing  Goal: Ability to verbalize positive feelings about self will improve  Outcome: Progressing     Problem: Health Behavior:  Goal: Ability to identify appropriate support needs will improve  Outcome: Progressing  Goal: Ability to identify and utilize available support systems will improve  Outcome: Progressing     Problem: Fall Risk  Goal: Patient will remain free from falls  Description: Target End Date:  Prior to discharge or change in level of care    Document interventions on the [facility] Fall Risk Assessment    1.  Assess for fall risk factors  2.  Implement fall precautions  Outcome: Progressing     Problem: Provide Safe Environment  Goal: Suicide environmental safety, protocols, policies, and practices will be implemented  Description: Target End Date:  resolve day 1    1.  Remove objects or personal belongings that may cause harm or injury to self or others  2.  Dietary tray modifications (paperware)  3.  Provide a safe environment  4.  Render close patient supervision by sustaining observation or awareness of the patient at all times  Outcome: Progressing     Problem: Psychosocial  Goal: Patient's ability to identify and develop effective coping behaviors will improve  Description: Target End  Date:  1 to 3 days    1.  Present opportunities for the patient to express thoughts, and feelings in a nonjudgmental environment  2.  Help the patient with problem-solving in a constructive manner.  3.  Educate the patient on cognitive-behavioral self-management responses to suicidal thoughts.  4.  Introduce the use of self-expression methods to manage suicidal feelings  5.  Provide emotional support  6.  Encourage identification of positive aspects of self  Outcome: Progressing  Goal: Patient's ability to identify and utilize available support systems will improve  Description: Target End Date:  1 to 3 days    1.  Help patient identify available resources and support systems  2.  Collaborate with interdisciplinary team  3.  Collaborate with patient, family/caregiver and other support systems  Outcome: Progressing     Problem: Safety - Medical Restraint  Goal: Remains free of injury from restraints (Restraint for Interference with Medical Device)  Description: INTERVENTIONS:  1. Determine that other, less restrictive measures have been tried or would not be effective before applying the restraint  2. Evaluate the patient's condition at the time of restraint application  3. Educate patient/family regarding the reason for restraint  4. Q2H: Monitor safety, psychosocial status, comfort, circulation, respiratory status, LOC, nutrition and hydration  Outcome: Progressing  Goal: Free from restraint(s) (Restraint for Interference with Medical Device)  Description: INTERVENTIONS:  1.  ONCE/SHIFT or MINIMUM Q12H: Assess and document the continuing need for restraints  2.  Q24H: Continued use of restraint requires LIP to perform face to face examination and written order  3.  Identify and implement measures to help patient regain control  4.  Educate patient/family on discontinuation criteria   5.  Assess patient's understanding and retention of education provided  6.  Assess readiness for release & initiate progressive  release per protocol  7.  Identify and document criteria for restraints  Outcome: Progressing     The patient is Watcher - Medium risk of patient condition declining or worsening    Shift Goals  Clinical Goals: safety, L2K, monitor labs/tele  Patient Goals: leave  Family Goals: RITIKA    Progress made toward(s) clinical / shift goals:  Patient compliant with tele box.    Patient is not progressing towards the following goals: Patient anxious.

## 2024-10-01 NOTE — DISCHARGE PLANNING
[facility] ED Behavioral Health Fax Referral      Referral: Legal Hold    Intervention: Patient referral to community inpatient  facility    Pt is court committed    Patient’s Insurance Listed on Face Sheet: None    Referrals sent to: [facility]    Referrals faxed by Sandy CASTELLANO    This referral contains the following information:  Face sheet __x__  Page 1 and Page 2 of Legal Hold _x___  Alert Team Assessment/Psych Assessment __x__  Head to toe physical exam __x__  Urine Drug Screen __x__  Blood Alcohol ____  Vital signs __x__  Pregnancy test when applicable ___  Medications list _x___  Covid screening ____    Plan: Patient will transfer to mental health facility once acceptance is obtained    For all referral information, please contact the  referral office daily between the hours of 7:00 a.m. to 6:00 p.m. at:   Phone [phone]   Fax [phone]    ED  Alert Team   Phone [phone] Fax [phone]    [facility] Emergency Department Contact numbers:  Green Pod [phone]   Blue Pod [phone]   Red Pod [phone]   Pediatrics [phone]

## 2024-10-01 NOTE — DISCHARGE PLANNING
Alert Team Note     Shiela at [facility] requested pt transfer today at 2300 pending negative COVID test. Will fax COVID results once in pt's chart.    Notified bedside RN Tammie, RN VALARIE Chaudhry, and Alert Team DPA

## 2024-10-01 NOTE — PROGRESS NOTES
Hospital Medicine Daily Progress Note    Date of Service  9/30/2024    Chief Complaint  Lee Yadav is a 39 y.o. male admitted 9/26/2024 with altered mental status with concern for seizure.    Hospital Course  Lee Yadav is a 39 y.o. male with history of bipolar disorder who presented 9/26/2024 from psychiatric facility [facility], where he has been hospitalized for his psychiatric illness, with concern for periods of altered mentation in the last 2 days.  Per ERP Dr. Bentley who spoke to MD at psychiatric facility, patient would have.  When he becomes nonverbal and nonresponsive, looking with a blank stare, will track but would not respond to questions.  Patient himself states that he had several orthostatic syncopal episode, most recently earlier today when he was feeling dizzy, passed out and fell hitting his right hip.  Currently he reports some hip pain.  Reportedly he was started on Thorazine 2 days ago  Denies any GI illness.  In ER vital stable  Prolactin was checked and elevated at 38  CPK elevated at 528.  Alcohol was not elevated.  UDS: Cannabinoids, benzodiazepines.  UA is negative.  VBG is unremarkable.  CT head without contrast negative.  Pelvic x-ray: Degenerative changes in the sacroiliac joints.  EKG: Sinus rhythm heart rate 69, no significant T/ST fluctuations, QTc is not prolonged.     Multiple psychiatric medications noted in the patient outpatient medications list: Thorazine, lithium, Latuda, olanzapine, trazodone    Interval Problem Update  9/27/2024  Patient was seen and examined on the telemetry floor.  He continues on psychiatric precautions.  Parents and sister at bedside.  He is alert and orient x 3.  States that he hears other people outside the room talking about him suggestive of paranoia.    He declined EEG twice this morning.  I discussed this with the EEG technician.  I explained to the patient the importance of obtaining an EEG to figure out what is going on.  And the  patient agreed to proceed.  I communicated this with the EEG technician, will reattempt EEG.  I discussed case with Dr. Rushing of psychiatry, who states that this is a very complicated psychiatric case.  Patient is on multiple psychiatric medications.  We need to use Ativan to calm patient's paranoia in order to perform EEG.    9/28/2024  Patient was seen and examined on the telemetry floor.  Continues on suicide precautions.  Patient was seen on 2 separate occasions last following a ground-level fall.  States that he was up and walking and incidentally fell hitting his face onto the foot of the bed.  Had some bleeding in the upper lip and inner gum, which has subsided.  Denied preceding dizziness prior to the fall.  Witnessed by sitter, patient had a staring episode prior to the fall.  He is alert and orient x 3.  Discussed with EEG technician.  I have reordered EEG.  Earlier today, he was complaining of intermittent anxiety.  Continue to have depression but denying any suicidal ideation.  Stating that he would like to be buried with his body intact.  Assured him that he was not dying.  Denying any visual hallucinations.  Still believes that people are talking about him outside the room at times.    9/29/2024  Patient was seen and examined on the telemetry floor.  Continues on suicide precautions.  Patient continues to have some paranoid thoughts but states that is improving.  He is requesting to go outside.  I have given permission with security accompanying.  Spot EEG was negative for seizure activity.  He is agreeable to 24-hour EEG monitoring, which I have ordered.    9/30/2024  Patient was seen and examined on the telemetry floor.  Initially refusing telemetry monitoring he is agreeable once I explained that we are trying to monitor for arrhythmias during his spells.  Undergoing continuous EEG monitoring for 24 hours to assess for seizures.  Denies any current hallucinations or suicidal ideation.  Tried to  elope this morning.  Decreasing Zyprexa to 10 mg by daily.  Continue lithium 900 mg at bedtime.  Check lithium level tomorrow.  Starting melatonin 5 mg by mouth at bedtime as needed for insomnia.  Behavioral health consult placed.  Discussed with Dr. Denis of psychiatry.      I have discussed this patient's plan of care and discharge plan at IDT rounds today with Case Management, Nursing, Nursing leadership, and other members of the IDT team.    Consultants/Specialty  psychiatry    Code Status  Full Code    Disposition  The patient is not medically cleared for discharge to home or a post-acute facility.  Anticipate discharge to: a psychiatric hospital    I have placed the appropriate orders for post-discharge needs.    Review of Systems  Review of Systems   Constitutional:  Negative for chills and fever.   Respiratory:  Negative for cough and shortness of breath.    Cardiovascular:  Negative for chest pain and palpitations.   Gastrointestinal:  Negative for abdominal pain, nausea and vomiting.   Musculoskeletal:  Negative for falls, joint pain and myalgias.   Neurological:  Negative for dizziness and headaches.   Psychiatric/Behavioral:  Negative for hallucinations. The patient is nervous/anxious.         Physical Exam  Temp:  [36.1 °C (97 °F)-36.6 °C (97.9 °F)] 36.2 °C (97.2 °F)  Pulse:  [52-82] 82  Resp:  [17-18] 18  BP: (117-151)/(62-99) 141/62  SpO2:  [96 %-98 %] 98 %    Physical Exam  Vitals and nursing note reviewed. Exam conducted with a chaperone present.   Constitutional:       General: He is not in acute distress.     Appearance: He is ill-appearing.   HENT:      Head: Normocephalic and atraumatic.      Comments: EEG leads in place     Mouth/Throat:      Mouth: Mucous membranes are moist.      Pharynx: Oropharynx is clear. No oropharyngeal exudate.      Comments: Healing abrasion to the left upper lip  Eyes:      General: No scleral icterus.        Right eye: No discharge.         Left eye: No discharge.       Conjunctiva/sclera: Conjunctivae normal.   Cardiovascular:      Rate and Rhythm: Normal rate and regular rhythm.      Pulses: Normal pulses.      Heart sounds: Normal heart sounds. No murmur heard.  Pulmonary:      Effort: Pulmonary effort is normal. No respiratory distress.      Breath sounds: Normal breath sounds.   Abdominal:      General: Abdomen is flat. Bowel sounds are normal. There is no distension.      Palpations: Abdomen is soft.   Musculoskeletal:         General: No swelling.      Cervical back: Neck supple. No tenderness.      Right lower leg: No edema.      Left lower leg: No edema.   Skin:     General: Skin is warm and dry.      Coloration: Skin is pale.   Neurological:      Mental Status: He is alert and oriented to person, place, and time. Mental status is at baseline.      Motor: No weakness.   Psychiatric:         Attention and Perception: Attention normal.         Mood and Affect: Mood is anxious. Affect is blunt and flat.         Speech: Speech normal.         Behavior: Behavior normal. Behavior is cooperative.         Thought Content: Thought content is paranoid.         Cognition and Memory: Cognition normal.         Judgment: Judgment normal.         Fluids  No intake or output data in the 24 hours ending 09/30/24 1924       Laboratory                            Imaging  CT-HEAD W/O   Final Result      Normal CT of the head without IV contrast.               DX-PELVIS-1 OR 2 VIEWS   Final Result      1. No acute post traumatic imaging findings.   2. Moderate degenerative changes involving the sacroiliac joints, greater than expected for the age of the patient.      DX-CHEST-PORTABLE (1 VIEW)   Final Result      No acute cardiac or pulmonary abnormalities are identified.           Assessment/Plan  * Seizure (HCC)- (present on admission)  Assessment & Plan  9/29/2024  Seizure versus pseudoseizure.  Prolactin is elevated suggestive of a actual seizure.  CPK was also elevated however can be  related to fall.  Spot EEG was negative  I have ordered 24-hour continuous EEG monitoring, patient is agreeable.    9/30/2024  Undergoing 24-hour EEG monitoring.    Ground-level fall- (present on admission)  Assessment & Plan  9/28/2024  Occurred today following staring episode.  It is face onto the foot of the bed resulting in laceration of his upper gum and abrasion to the left upper lip.  Resuming continuous cardiac monitoring to monitor for arrhythmias    Psychosis (HCC)- (present on admission)  Assessment & Plan  9/27/2024  Severe paranoid vs auditory hallucinations.  Psychiatry recommendations reviewed and implemented.  Increasing lithium to 900 mg QHS  Changing olanzapine to 10 mg PO BID. Plan to taper.  Hydroxyzine as needed for anxiety.  Ativan as needed for breakthrough anxiety.     9/28/2024  Continue current medications  Receiving Ativan as needed yesterday along with Droxia seen and Benadryl for sleep, which improved his sleep.    9/29/2024  Appears to be improving on current medications.    9/30/2024  Decreasing Zyprexa to 10 mg by daily.  Continue lithium 900 mg at bedtime.  Check lithium level tomorrow.  Starting melatonin 5 mg by mouth at bedtime as needed for insomnia.  Behavioral health consult placed.  Discussed with Dr. Denis of psychiatry.    Abrasion of skin of lip- (present on admission)  Assessment & Plan  9/29/2024  Secondary to ground-level fall in the hospital.  I ordered bacitracin    Marijuana use- (present on admission)  Assessment & Plan  9/28/2024  Urine drug screen positive for cannabinoids.  May be contributing to anxiety and paranoia.    Insomnia due to other mental disorder- (present on admission)  Assessment & Plan  9/28/2024  Ongoing insomnia.  Continue Benadryl as needed at bedtime  Meditative breathing techniques and restoration of circadian rhythm encouraged.    9/30/2024  Ordering melatonin 5 mg at bedtime as needed    Anxiety- (present on admission)  Assessment &  Plan  9/27/2024  Continue hydroxyzine as needed.  Ativan as needed for breakthrough anxiety.    9/28/2024  Continue hydroxyzine and Ativan    Hypothyroidism  Assessment & Plan  Continue levothyroxine  Recheck TSH    9/28/2024  TSH of 2.08.  Within normal limits.  Continue levothyroxine 112 mcg daily.    Hypertension  Assessment & Plan  Continue losartan  Monitor blood pressure    9/27/2024  Stable.  Continue losartan 25 mg daily    Rhabdomyolysis  Assessment & Plan  Traumatic versus nontraumatic  IV hydration  Repeat CPK  If goes up, consider holding antipsychotics.  Check lithium level    9/27/2024  Improving to 437  Check level tomorrow  Question whether related to seizure.    Suicidal ideations  Assessment & Plan  Patient is vague in regards to questioning for suicidal ideations  He has been on legal hold  Will put suicidal precautions    9/27/2024  Continues with one to one sitter with suicide precautions.    9/29/2024  Continues on one-to-one sitter with suicide precautions.    Syncope  Assessment & Plan  Suspected side effect of antipsychotics, causing ineffective postural vascular tone reflex, orthostatic hypotension, orthostatic syncope  EKG does not show QTc prolongation  Will monitor on telemetry for arrhythmia  And give bolus of saline 1 L  Orthostatic vitals    9/27/2024  Continue telemetry monitoring.    9/28/2024  Patient was discontinued telemetry after which he had a ground-level fall following a staring episode.  I have reinitiated continuous telemetry monitoring.  Does not appear to be cardiogenic in nature.  Suspect possible seizures.  EEG has been ordered.    9/29/2024  No further episodes.  Continue continuous cardiac monitoring  Spot EEG was negative  I have ordered 24-hour continuous EEG monitoring    9/30/2024  No further syncope episodes.  Patient agreeable to continuous EEG monitoring and continuous telemetry monitoring.    Encephalopathy- (present on admission)  Assessment &  Plan  Differential diagnosis may include polypharmacy and side effects of his psychiatric medications, given added Thorazine 2 days ago, and catatonic state, non convulsive seizure  CT head without contrast is negative  Prolactin was noted is elevated which is nonspecific, and could be elevated as a side effect of neuroleptics, as well as after seizures  CPK is elevated that could be result of muscle injury or again side effects of antipsychotics.  However there is no increased muscle tone on exam  Plan: Evaluate patient with EEG.  Check ammonia, TSH, vitamin B12, lithium level  Consider psychiatric consultation for medications management given concern for side effect and polypharmacy    9/27/2024  Appears to be improving.  Alert and oriented x3.  States he from [place] and in [place] due to psychiatric issues.  Not sure if he wants to go back to [facility].  Agreeable to EEG.  Discussed with EEG technician.  Discussed with Dr. Rushing of psychiatry.    9/28/2024  Alert and orient x 3.  Had a ground-level fall after staring episode.  He was just taken off telemetry monitoring.  I discussed with the EEG technician about obtaining urgent EEG.    9/30/2024  Continues to be alert and orient x 3.    9/29/2024  Alert and orient x 3.  Improving.         VTE prophylaxis:    enoxaparin ppx      I have performed a physical exam and reviewed and updated ROS and Plan today (9/30/2024). In review of yesterday's note (9/29/2024), there are no changes except as documented above.
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
## 2024-10-01 NOTE — PROGRESS NOTES
Monitor Summary  Rhythm: SR  Rate: 57-87  Ectopy: PVC (R), HR 44   Measurements: .18/.09/.44  ---12 hr Chart Review---

## 2024-10-02 ENCOUNTER — APPOINTMENT (OUTPATIENT)
Dept: RADIOLOGY | Facility: MEDICAL CENTER | Age: 39
End: 2024-10-02
Attending: STUDENT IN AN ORGANIZED HEALTH CARE EDUCATION/TRAINING PROGRAM
Payer: MEDICAID

## 2024-10-02 PROCEDURE — A9270 NON-COVERED ITEM OR SERVICE: HCPCS | Performed by: STUDENT IN AN ORGANIZED HEALTH CARE EDUCATION/TRAINING PROGRAM

## 2024-10-02 PROCEDURE — 99233 SBSQ HOSP IP/OBS HIGH 50: CPT | Performed by: STUDENT IN AN ORGANIZED HEALTH CARE EDUCATION/TRAINING PROGRAM

## 2024-10-02 PROCEDURE — 96372 THER/PROPH/DIAG INJ SC/IM: CPT | Mod: XU

## 2024-10-02 PROCEDURE — A9270 NON-COVERED ITEM OR SERVICE: HCPCS | Performed by: INTERNAL MEDICINE

## 2024-10-02 PROCEDURE — 700117 HCHG RX CONTRAST REV CODE 255: Mod: UD | Performed by: STUDENT IN AN ORGANIZED HEALTH CARE EDUCATION/TRAINING PROGRAM

## 2024-10-02 PROCEDURE — 700102 HCHG RX REV CODE 250 W/ 637 OVERRIDE(OP): Performed by: STUDENT IN AN ORGANIZED HEALTH CARE EDUCATION/TRAINING PROGRAM

## 2024-10-02 PROCEDURE — 700102 HCHG RX REV CODE 250 W/ 637 OVERRIDE(OP): Performed by: INTERNAL MEDICINE

## 2024-10-02 PROCEDURE — G0378 HOSPITAL OBSERVATION PER HR: HCPCS

## 2024-10-02 PROCEDURE — 75561 CARDIAC MRI FOR MORPH W/DYE: CPT

## 2024-10-02 PROCEDURE — 700111 HCHG RX REV CODE 636 W/ 250 OVERRIDE (IP): Mod: JZ,UD | Performed by: INTERNAL MEDICINE

## 2024-10-02 PROCEDURE — A9578 INJ MULTIHANCE MULTIPACK: HCPCS | Mod: UD | Performed by: STUDENT IN AN ORGANIZED HEALTH CARE EDUCATION/TRAINING PROGRAM

## 2024-10-02 RX ORDER — LORAZEPAM 2 MG/1
2 TABLET ORAL
Status: DISCONTINUED | OUTPATIENT
Start: 2024-10-02 | End: 2024-10-03

## 2024-10-02 RX ORDER — LORAZEPAM 1 MG/1
1 TABLET ORAL EVERY MORNING
Status: COMPLETED | OUTPATIENT
Start: 2024-10-03 | End: 2024-10-03

## 2024-10-02 RX ADMIN — SENNOSIDES AND DOCUSATE SODIUM 2 TABLET: 50; 8.6 TABLET ORAL at 16:56

## 2024-10-02 RX ADMIN — HYDROXYZINE HYDROCHLORIDE 50 MG: 50 TABLET, FILM COATED ORAL at 21:24

## 2024-10-02 RX ADMIN — Medication 5 MG: at 19:59

## 2024-10-02 RX ADMIN — GEMFIBROZIL 600 MG: 600 TABLET ORAL at 08:00

## 2024-10-02 RX ADMIN — LITHIUM CARBONATE 900 MG: 300 TABLET ORAL at 19:58

## 2024-10-02 RX ADMIN — LURASIDONE HYDROCHLORIDE 120 MG: 40 TABLET, FILM COATED ORAL at 19:59

## 2024-10-02 RX ADMIN — LOSARTAN POTASSIUM 25 MG: 25 TABLET, FILM COATED ORAL at 05:53

## 2024-10-02 RX ADMIN — OLANZAPINE 10 MG: 5 TABLET, FILM COATED ORAL at 05:54

## 2024-10-02 RX ADMIN — LORAZEPAM 1 MG: 1 TABLET ORAL at 19:59

## 2024-10-02 RX ADMIN — HYDROXYZINE HYDROCHLORIDE 50 MG: 50 TABLET, FILM COATED ORAL at 03:11

## 2024-10-02 RX ADMIN — LEVOTHYROXINE SODIUM 112 MCG: 0.11 TABLET ORAL at 05:53

## 2024-10-02 RX ADMIN — GEMFIBROZIL 600 MG: 600 TABLET ORAL at 19:58

## 2024-10-02 RX ADMIN — GADOBENATE DIMEGLUMINE 20 ML: 529 INJECTION, SOLUTION INTRAVENOUS at 17:25

## 2024-10-02 RX ADMIN — ENOXAPARIN SODIUM 40 MG: 100 INJECTION SUBCUTANEOUS at 16:56

## 2024-10-02 ASSESSMENT — ENCOUNTER SYMPTOMS
PALPITATIONS: 0
TINGLING: 1
CHILLS: 0
NAUSEA: 0
ABDOMINAL PAIN: 0
DIZZINESS: 0
DIZZINESS: 1
GASTROINTESTINAL NEGATIVE: 1
MYALGIAS: 0
EYES NEGATIVE: 1
FEVER: 0
FALLS: 0
COUGH: 0
SHORTNESS OF BREATH: 0
HALLUCINATIONS: 0
MUSCULOSKELETAL NEGATIVE: 1
HEADACHES: 0
CONSTITUTIONAL NEGATIVE: 1
VOMITING: 0
NERVOUS/ANXIOUS: 1
CARDIOVASCULAR NEGATIVE: 1
RESPIRATORY NEGATIVE: 1
INSOMNIA: 1

## 2024-10-02 ASSESSMENT — FIBROSIS 4 INDEX: FIB4 SCORE: 1.05

## 2024-10-02 NOTE — PROGRESS NOTES
Hospital Medicine Daily Progress Note    Date of Service  10/1/2024    Chief Complaint  Lee Yadav is a 39 y.o. male admitted 9/26/2024 with altered mental status with concern for seizure.    Hospital Course  Lee Yadav is a 39 y.o. male with history of bipolar disorder who presented 9/26/2024 from psychiatric facility [facility], where he has been hospitalized for his psychiatric illness, with concern for periods of altered mentation in the last 2 days.    Per ERP Dr. Bentley who spoke to MD at psychiatric facility, patient would become nonverbal and nonresponsive, looking with a blank stare, will track but would not respond to questions.  Patient states that he had several syncopal episode, most recently on day of admission when he was feeling dizzy, passed out and fell hitting his right hip.    he was started on Thorazine 2 days prior to admission  Denies any GI illness.  UDS: Cannabinoids, benzodiazepines.  CT head without contrast negative.  Pelvic x-ray: Degenerative changes in the sacroiliac joints.  EKG: Sinus rhythm heart rate 69, no significant T/ST fluctuations, QTc is not prolonged.  Multiple psychiatric medications noted in the patient outpatient medications list: Thorazine, lithium, Latuda, olanzapine, trazodone    Was expressing paranoid delusions, refused EEG initially. Spot extended to 22 hours, patient removed leads at that time. Had another syncopal episode and injured face.   Decreased Zyprexa to 10 mg by daily.  Started melatonin 5 mg by mouth at bedtime as needed for insomnia.  Behavioral health consult placed.  Discussed with Dr. Denis of psychiatry.    Interval Problem Update  10/1/2024  Afebrile pulse 59-62 respiratory rate 16 systolic blood pressure 110-150s pulse ox 96-98% on room air.  EEG did not show any seizure activity or evidence of recent seizures.  Briefly discussed with neurology, recommended obtaining echo to complete syncope workup.  Due to echo results showing  normal RV size with preserved function however hypokinesis of the free wall and hypokinetic apex, recommendation to consider excluding PE and if negative obtain cardiac MRI for better assessment evaluation of the RV.  I ordered a D-dimer which was undetectable, will proceed with cardiac MRI.  Initiating AEDs not recommended by neurology at this time, does recommend one-time follow-up in their seizure clinic.    I have discussed this patient's plan of care and discharge plan at IDT rounds today with Case Management, Nursing, Nursing leadership, and other members of the IDT team.    Consultants/Specialty  psychiatry    Code Status  Full Code    Disposition  The patient is not medically cleared for discharge to home or a post-acute facility.      I have placed the appropriate orders for post-discharge needs.    Review of Systems  Review of Systems   Constitutional:  Negative for chills and fever.   Respiratory:  Negative for cough and shortness of breath.    Cardiovascular:  Negative for chest pain and palpitations.   Gastrointestinal:  Negative for abdominal pain, nausea and vomiting.   Musculoskeletal:  Negative for falls, joint pain and myalgias.   Neurological:  Negative for dizziness and headaches.   Psychiatric/Behavioral:  Negative for hallucinations. The patient is nervous/anxious.         Physical Exam  Temp:  [36.4 °C (97.5 °F)-36.5 °C (97.7 °F)] 36.5 °C (97.7 °F)  Pulse:  [59-88] 62  Resp:  [16-18] 16  BP: (113-158)/(66-86) 158/86  SpO2:  [93 %-98 %] 96 %    Physical Exam  Vitals and nursing note reviewed. Exam conducted with a chaperone present.   Constitutional:       General: He is not in acute distress.     Appearance: He is not ill-appearing or toxic-appearing.   HENT:      Head: Normocephalic and atraumatic.      Comments: EEG leads in place     Mouth/Throat:      Mouth: Mucous membranes are moist.      Pharynx: Oropharynx is clear. No oropharyngeal exudate.      Comments: Healing abrasion to the left  upper lip  Eyes:      General: No scleral icterus.        Right eye: No discharge.         Left eye: No discharge.      Conjunctiva/sclera: Conjunctivae normal.   Cardiovascular:      Rate and Rhythm: Normal rate and regular rhythm.      Pulses: Normal pulses.      Heart sounds: Normal heart sounds. No murmur heard.  Pulmonary:      Effort: Pulmonary effort is normal. No respiratory distress.      Breath sounds: Normal breath sounds.   Abdominal:      General: Abdomen is flat. Bowel sounds are normal. There is no distension.      Palpations: Abdomen is soft.   Musculoskeletal:         General: No swelling.      Cervical back: Neck supple. No tenderness.      Right lower leg: No edema.      Left lower leg: No edema.   Skin:     General: Skin is warm and dry.   Neurological:      Mental Status: He is alert and oriented to person, place, and time. Mental status is at baseline.      Motor: No weakness.   Psychiatric:         Attention and Perception: Attention normal.         Mood and Affect: Mood is anxious. Affect is blunt and flat.         Speech: Speech normal.         Behavior: Behavior normal. Behavior is cooperative.         Thought Content: Thought content is paranoid.         Cognition and Memory: Cognition normal.         Judgment: Judgment normal.         Fluids    Intake/Output Summary (Last 24 hours) at 10/1/2024 1830  Last data filed at 10/1/2024 1303  Gross per 24 hour   Intake 150 ml   Output 0 ml   Net 150 ml          Laboratory                            Imaging  EC-ECHOCARDIOGRAM COMPLETE W/O CONT   Final Result      CT-HEAD W/O   Final Result      Normal CT of the head without IV contrast.               DX-PELVIS-1 OR 2 VIEWS   Final Result      1. No acute post traumatic imaging findings.   2. Moderate degenerative changes involving the sacroiliac joints, greater than expected for the age of the patient.      DX-CHEST-PORTABLE (1 VIEW)   Final Result      No acute cardiac or pulmonary abnormalities  are identified.           Assessment/Plan  * Seizure (HCC)- (present on admission)  Assessment & Plan  9/29/2024  Seizure versus pseudoseizure.  Prolactin is elevated suggestive of a actual seizure.  CPK was also elevated however can be related to fall.  Spot EEG was negative  I have ordered 24-hour continuous EEG monitoring, patient is agreeable.    9/30/2024  Undergoing 24-hour EEG monitoring.    Abrasion of skin of lip- (present on admission)  Assessment & Plan  9/29/2024  Secondary to ground-level fall in the hospital.  I ordered bacitracin    Marijuana use- (present on admission)  Assessment & Plan  9/28/2024  Urine drug screen positive for cannabinoids.  May be contributing to anxiety and paranoia.    Insomnia due to other mental disorder- (present on admission)  Assessment & Plan  9/28/2024  Ongoing insomnia.  Continue Benadryl as needed at bedtime  Meditative breathing techniques and restoration of circadian rhythm encouraged.    9/30/2024  Ordering melatonin 5 mg at bedtime as needed    Ground-level fall- (present on admission)  Assessment & Plan  9/28/2024  Occurred today following staring episode.  It is face onto the foot of the bed resulting in laceration of his upper gum and abrasion to the left upper lip.  Resuming continuous cardiac monitoring to monitor for arrhythmias    Psychosis (HCC)- (present on admission)  Assessment & Plan  9/27/2024  Severe paranoid vs auditory hallucinations.  Psychiatry recommendations reviewed and implemented.  Increasing lithium to 900 mg QHS  Changing olanzapine to 10 mg PO BID. Plan to taper.  Hydroxyzine as needed for anxiety.  Ativan as needed for breakthrough anxiety.     9/28/2024  Continue current medications  Receiving Ativan as needed yesterday along with Droxia seen and Benadryl for sleep, which improved his sleep.    9/29/2024  Appears to be improving on current medications.    9/30/2024  Decreasing Zyprexa to 10 mg by daily.  Continue lithium 900 mg at  bedtime.  Check lithium level tomorrow.  Starting melatonin 5 mg by mouth at bedtime as needed for insomnia.  Behavioral health consult placed.  Discussed with Dr. Denis of psychiatry.    Anxiety- (present on admission)  Assessment & Plan  9/27/2024  Continue hydroxyzine as needed.  Ativan as needed for breakthrough anxiety.    9/28/2024  Continue hydroxyzine and Ativan    Hypothyroidism  Assessment & Plan  Continue levothyroxine  Recheck TSH    9/28/2024  TSH of 2.08.  Within normal limits.  Continue levothyroxine 112 mcg daily.    Hypertension  Assessment & Plan  Continue losartan  Monitor blood pressure    9/27/2024  Stable.  Continue losartan 25 mg daily    Rhabdomyolysis  Assessment & Plan  Traumatic versus nontraumatic  IV hydration  Repeat CPK  If goes up, consider holding antipsychotics.  Check lithium level    9/27/2024  Improving to 437  Check level tomorrow  Question whether related to seizure.    Suicidal ideations  Assessment & Plan  Patient is vague in regards to questioning for suicidal ideations  He has been on legal hold  Will put suicidal precautions    9/27/2024  Continues with one to one sitter with suicide precautions.    9/29/2024  Continues on one-to-one sitter with suicide precautions.    Syncope  Assessment & Plan  Suspected side effect of antipsychotics, causing ineffective postural vascular tone reflex, orthostatic hypotension, orthostatic syncope  EKG does not show QTc prolongation  Will monitor on telemetry for arrhythmia  And give bolus of saline 1 L  Orthostatic vitals    9/27/2024  Continue telemetry monitoring.    9/28/2024  Patient was discontinued telemetry after which he had a ground-level fall following a staring episode.  I have reinitiated continuous telemetry monitoring.  Does not appear to be cardiogenic in nature.  Suspect possible seizures.  EEG has been ordered.    9/29/2024  No further episodes.  Continue continuous cardiac monitoring  Spot EEG was negative  I have ordered  24-hour continuous EEG monitoring    9/30/2024  No further syncope episodes.  Patient agreeable to continuous EEG monitoring and continuous telemetry monitoring.    Encephalopathy- (present on admission)  Assessment & Plan  Differential diagnosis may include polypharmacy and side effects of his psychiatric medications, given added Thorazine 2 days ago, and catatonic state, non convulsive seizure  CT head without contrast is negative  Prolactin was noted is elevated which is nonspecific, and could be elevated as a side effect of neuroleptics, as well as after seizures  CPK is elevated that could be result of muscle injury or again side effects of antipsychotics.  However there is no increased muscle tone on exam  Plan: Evaluate patient with EEG.  Check ammonia, TSH, vitamin B12, lithium level  Consider psychiatric consultation for medications management given concern for side effect and polypharmacy    9/27/2024  Appears to be improving.  Alert and oriented x3.  States he from [place] and in [place] due to psychiatric issues.  Not sure if he wants to go back to [facility].  Agreeable to EEG.  Discussed with EEG technician.  Discussed with Dr. Rushing of psychiatry.    9/28/2024  Alert and orient x 3.  Had a ground-level fall after staring episode.  He was just taken off telemetry monitoring.  I discussed with the EEG technician about obtaining urgent EEG.    9/30/2024  Continues to be alert and orient x 3.    9/29/2024  Alert and orient x 3.  Improving.         VTE prophylaxis:    enoxaparin ppx      I have performed a physical exam and reviewed and updated ROS and Plan today (10/1/2024). In review of yesterday's note (9/30/2024), there are no changes except as documented above.  Total time spent 53 minutes. I spent greater than 50% of the time for patient care, counseling, and coordination on this date, including unit/floor time, and face-to-face time with the patient as per interval events, my own review of patient's imaging  and lab analysis and developing my assessment and plan above.

## 2024-10-02 NOTE — CONSULTS
"PSYCHIATRIC FOLLOW-UP: (established)  PSYCHIATRIC FOLLOW-UP: (established)  Reason for admission:   Multiple GLF  Legal Hold Status:      court committed       Chart reviewed.       HPI:          This is a 39-year-old male with a complex psychiatric history of bipolar type I who presented to the hospital after being transferred from [facility] after multiple ground-level falls during his hospitalization.     Interval hx  Pt was interviewed in his room. He reports to be in a \"good\" mood. Pt was just returning from a walk outside and denies any feeling of lightheadedness or dizziness. He says that last night was the best sleep he has had in the hospital since admission. He still reports problems falling asleep and waking up throughout the night but was able to get 6 hours of sleep last night. He has had good appetite. He says he feels wound up and still hears people talking about him in the hallway and feels people are against him and mocking what he is going through. Pt was reassured that the whole team wants nothing but the best for him and he understands these feeling are part of his paranoia. He says that when he watches TV he feels like they are talking about him so he has gone to reading and writing as a way to distract himself from these thoughts of paranoia.     Pt called his parents during interview. Pt's parents had questions pertaining to why the pt was placed on a \"6 month hold\" and when do they expect the pt to be medically cleared for transfer back to [facility]. Pt and his parents were reassured that the pt was placed on the hold to make sure he is kept safe during his times of paranoia and yamila so that if he were to try to leave he couldn't so he gets the help he needs. It was also shared that the pt has a MRI scheduled fro today to rule out and physical cause of his syncope episodes. Once he is medically cleared he will be transferred to [facility].    Pt's blood pressure " "was measured while laying down to be 134/78 and after letting the pt stand for 2 minutes measured again at 113/79. Pt reported feelings of being \"spacy\" after standing up but denied any feelings of dizziness or that he felt like he was going to pass out. Pt was advised to stand up from bed slowly to avoid any feelings of lightheaded or passing out in the future.    No additional concerns reported at this time.    Medical ROS (as pertinent):     Review of Systems   Constitutional: Negative.    HENT: Negative.     Eyes: Negative.    Respiratory: Negative.     Cardiovascular: Negative.    Gastrointestinal: Negative.    Genitourinary: Negative.    Musculoskeletal: Negative.    Skin: Negative.    Neurological:  Positive for dizziness (when standing up to fast) and tingling.        Lower extremity paresthesias and distal digits.  Occurring over the past several days.  New   Endo/Heme/Allergies: Negative.    Psychiatric/Behavioral:  Negative for suicidal ideas. The patient is nervous/anxious and has insomnia.            Psychiatric Examination:  Vitals:   Vitals:    10/02/24 1050   BP: 134/79   Pulse:    Resp:    Temp:    SpO2:       See HPI for orthostatic vitals    General Appearance: Appears state age, appropriate grooming & hygiene, no acute distress, concerned about when he will be able to leave the hospital and length of legal hold  Behavior:    -Appropriate activity, appropriate eye contact, pleasant & cooperative   -No tremors noted   -No psychomotor agitation or retardation   -No posture or gait abnormalities appreciated  Speech: Appropriate quantity, spontaneous. Regular rate and rhythm. Appropriate volume.  Some monotone noted. Articulation is clear  Language: English  Thought processes: Coherent, linear, logical and goal-directed. No evidence of loose associations  Thought content: denies SI/HI/AVH. Not observed responding to internal stimuli. Paranoid thoughts that voices in the hallway and the TV are talking " "poorly about him and mocking him  Mood: \"Good\" as stated by patient  Affect: Congruent with stated mood.  Wanted affect   judgement and Insight and Impulse Control:Limited/Limited/Limited  Cognition:    -Alert & oriented x 3 (Person, place and time)   -Attention & concentration grossly intact   -Immediate/delayed memory not formally tested but grossly intact   -Age-appropriate fund of knowledge      New PAST MEDICAL/PSYCH/FAMILY/SOCIAL(as reported by patient):       None      EKG:   Results for orders placed or performed during the hospital encounter of 24   EKG (NOW)   Result Value Ref Range    Report       [facility]    Test Date:  2024  Pt Name:    LORENZO DOMINGUEZ                  Department: ER  MRN:        [id number]                      Room:       T734  Gender:     Male                         Technician: [id number]  :        1985                   Requested By:MEGGAN JOYCE  Order #:    [id number]                    Reading MD: MEGGAN JOYCE MD    Measurements  Intervals                                Axis  Rate:       69                           P:          -38  HI:         151                          QRS:        59  QRSD:       102                          T:          39  QT:         406  QTc:        435    Interpretive Statements  Sinus rhythm  ST elev, probable normal early repol pattern  Baseline wander in lead(s) V1,V2,V6  No previous ECG available for comparison  Electronically Signed On 2024 22:55:01 PDT by MEGGAN JOYCE MD        Brain Imaging:   CT W/O    result within normal limits   EEG:      EEG on  showed normal brain activity and awake and drowsy state.     24 Hr EEG couldn't be finished due to pt removing the monitor during the night; no signs of seizures before removal    Labs personally reviewed:   Recent Results (from the past 24 hour(s))   EC-ECHOCARDIOGRAM COMPLETE W/O CONT    Collection Time: 10/01/24 11:39 " AM   Result Value Ref Range    Eject.Frac. MOD BP 63.41     Eject.Frac. MOD 4C 65.04     Eject.Frac. MOD 2C 63.52     Left Ventrical Ejection Fraction 60    CoV-2, Flu A/B, And RSV by PCR (Cepheid)    Collection Time: 10/01/24  1:50 PM    Specimen: Nasal; Respirate   Result Value Ref Range    Influenza virus A RNA Negative Negative    Influenza virus B, PCR Negative Negative    RSV, PCR Negative Negative    SARS-CoV-2 by PCR NotDetected     SARS-CoV-2 Source NP Swab    D-DIMER    Collection Time: 10/01/24  2:50 PM   Result Value Ref Range    D-Dimer <0.27 0.00 - 0.50 ug/mL (FEU)         Assessment:  Pt is more coherent and agreeable to plan of transferring back to [facility] once medically cleared. Pt still feels paranoid that the health team and the TV is talking poorly about him but understands that these thoughts are part of his paranoia. Pt has his MRI scheduled for some time today and once medically cleared he will be transferred back to [facility] mental health services    He has been able to take walks and has not attempted to leave thew facility. He is very agreeable and cooperative with the team.    We will continue to titrate the pt off of taking multiple antipsychotics and continue to monitor pt's Lithium levels to make sure he remains in the therapeutic window.    Patient's set of orthostatic vitals today are consistent with orthostatic hypotension.  Likely exacerbated by antipsychotics.  Recommending against dual antipsychotic at this time and thus recommending discontinuing olanzapine in favor of utilizing benzodiazepines.  Restoril is a decent option for sleep maintenance.  However patient will need coverage of benzodiazepine treatment throughout the day, thus recommending transition to Ativan for sleep, yamila, paranoia and anxiety throughout the day in order to reduce polypharmacy    Dx:  1.Bipolar 1  2.Rule out underlying seizure activity        Medical :  Principal Problem:    Seizure (HCC)  (POA: Yes)  Active Problems:    Encephalopathy (POA: Yes)    Syncope (POA: Unknown)    Suicidal ideations (POA: Unknown)    Rhabdomyolysis (POA: Unknown)    Hypertension (POA: Unknown)    Hypothyroidism (POA: Unknown)    Anxiety (POA: Yes)    Psychosis (HCC) (POA: Yes)    Ground-level fall (POA: Yes)    Insomnia due to other mental disorder (POA: Yes)    Marijuana use (POA: Yes)    Abrasion of skin of lip (POA: Yes)  Resolved Problems:    * No resolved hospital problems. *            Plan:  Legal hold: Court committed  Psychotropic medications  DISCONTINUE Zyprexa to 10 mg daily  INITIATE Ativan 1 mg every morning and 2 mg nightly for anxiety paranoia yamila and sleep  DISCONTINUE Restoril  Continue melatonin 5 mg p.o. nightly as needed for sleep.  Ensure melatonin is given at or before 8 PM to prevent phase delay and worsening of insomnia  Continue lithium 900 mg nightly, lithium level: 0.8 on 10/1  Continue Latuda 120 mg p.o. daily  Continue as needed Ativan  Continue as needed hydroxyzine  Advised against watching TV due to exacerbation of paranoia  Please transfer pt to inpatient psychiatric hospital when medically cleared and bed is available  Labs reviewed  EKG reviewed  Discussed the case with:   Psychiatry will follow up  Thank you for the consult.         Sitter: 1:1  Phone: family ok  Visitors: family ok  Personal belongings: Books okay   thoughts as part of his illness. No attempts to elope or significant behavioral issues were noted  Patient demonstrating signs and symptoms consistent with orthostatic hypotension likely exacerbated by antipsychotic medications..  Advising discontinuing Zyprexa and initiating Ativan in place for anxiety paranoia yamila and sleep.  Also recommending discontinuing Restoril

## 2024-10-02 NOTE — DISCHARGE PLANNING
"ALERT team  note:  [facility] supervisor, Chao, called [facility] Alert team an spoke to writer RN re: pt is now identified on [employer] e-referral system as \"not medically cleared\" as entered at 1430 today, and the medical internist's note 9/3024 that states:  \"Disposition  The patient is not medically cleared for discharge to home or a post-acute facility\"    Chao states their intake/admissions dept will f/u with [facility] Alert team intake coodinators tomorrow, 10/2/24, re: pt's medical clearance status    "

## 2024-10-02 NOTE — CARE PLAN
Problem: Knowledge Deficit - Standard  Goal: Patient and family/care givers will demonstrate understanding of plan of care, disease process/condition, diagnostic tests and medications  Description: Target End Date:  1-3 days or as soon as patient condition allows    Document in Patient Education    1.  Patient and family/caregiver oriented to unit, equipment, visitation policy and means for communicating concern  2.  Complete/review Learning Assessment  3.  Assess knowledge level of disease process/condition, treatment plan, diagnostic tests and medications  4.  Explain disease process/condition, treatment plan, diagnostic tests and medications  Outcome: Progressing     Problem: Pain - Standard  Goal: Alleviation of pain or a reduction in pain to the patient’s comfort goal  Description: Target End Date:  Prior to discharge or change in level of care    Document on Vitals flowsheet    1.  Document pain using the appropriate pain scale per order or unit policy  2.  Educate and implement non-pharmacologic comfort measures (i.e. relaxation, distraction, massage, cold/heat therapy, etc.)  3.  Pain management medications as ordered  4.  Reassess pain after pain med administration per policy  5.  If opiods administered assess patient's response to pain medication is appropriate per POSS sedation scale  6.  Follow pain management plan developed in collaboration with patient and interdisciplinary team (including palliative care or pain specialists if applicable)  Outcome: Progressing     Problem: Depression  Goal: Patient and family/caregiver will verbalize accurate information about at least two of the possible causes of depression, three-four of the signs and symptoms of depression  Description: Target End Date:  1 to 3 days    1.  Assess the patient's and family/caregiver's knowledge regarding depression and its causes.  2.  Explain to the patient and family/caregiver regarding the major symptoms of depression.  3.   Inform the patient and family/caregiver that depression can be treated through medications and psychotherapy.  4.  Allow the patient to express feelings and perceptions  5.  Express hope to the patient with realistic comments about the patient's strengths and resources.  5.  Give positive feedback after a tasks are achieved.  6.  Encourage identification of positive aspects of self.  7.  Educate the patient about crisis intervention services such as suicide hotlines and other resources.  Outcome: Progressing     Problem: Safety:  Goal: Will remain free from injury  Outcome: Progressing     Problem: Psychosocial Needs:  Goal: Ability to identify and develop effective coping behavior will improve  Outcome: Progressing  Goal: Ability to verbalize positive feelings about self will improve  Outcome: Progressing     Problem: Health Behavior:  Goal: Ability to identify appropriate support needs will improve  Outcome: Progressing  Goal: Ability to identify and utilize available support systems will improve  Outcome: Progressing     Problem: Fall Risk  Goal: Patient will remain free from falls  Description: Target End Date:  Prior to discharge or change in level of care    Document interventions on the [facility] Fall Risk Assessment    1.  Assess for fall risk factors  2.  Implement fall precautions  Outcome: Progressing     Problem: Provide Safe Environment  Goal: Suicide environmental safety, protocols, policies, and practices will be implemented  Description: Target End Date:  resolve day 1    1.  Remove objects or personal belongings that may cause harm or injury to self or others  2.  Dietary tray modifications (paperware)  3.  Provide a safe environment  4.  Render close patient supervision by sustaining observation or awareness of the patient at all times  Outcome: Progressing     Problem: Psychosocial  Goal: Patient's ability to identify and develop effective coping behaviors will improve  Description: Target End  Date:  1 to 3 days    1.  Present opportunities for the patient to express thoughts, and feelings in a nonjudgmental environment  2.  Help the patient with problem-solving in a constructive manner.  3.  Educate the patient on cognitive-behavioral self-management responses to suicidal thoughts.  4.  Introduce the use of self-expression methods to manage suicidal feelings  5.  Provide emotional support  6.  Encourage identification of positive aspects of self  Outcome: Progressing  Goal: Patient's ability to identify and utilize available support systems will improve  Description: Target End Date:  1 to 3 days    1.  Help patient identify available resources and support systems  2.  Collaborate with interdisciplinary team  3.  Collaborate with patient, family/caregiver and other support systems  Outcome: Progressing     Problem: Safety - Medical Restraint  Goal: Remains free of injury from restraints (Restraint for Interference with Medical Device)  Description: INTERVENTIONS:  1. Determine that other, less restrictive measures have been tried or would not be effective before applying the restraint  2. Evaluate the patient's condition at the time of restraint application  3. Educate patient/family regarding the reason for restraint  4. Q2H: Monitor safety, psychosocial status, comfort, circulation, respiratory status, LOC, nutrition and hydration  Outcome: Progressing  Goal: Free from restraint(s) (Restraint for Interference with Medical Device)  Description: INTERVENTIONS:  1.  ONCE/SHIFT or MINIMUM Q12H: Assess and document the continuing need for restraints  2.  Q24H: Continued use of restraint requires LIP to perform face to face examination and written order  3.  Identify and implement measures to help patient regain control  4.  Educate patient/family on discontinuation criteria   5.  Assess patient's understanding and retention of education provided  6.  Assess readiness for release & initiate progressive  release per protocol  7.  Identify and document criteria for restraints  Outcome: Progressing     The patient is Watcher - Medium risk of patient condition declining or worsening    Shift Goals  Clinical Goals: Inquire about need for EEG  Patient Goals: Go home  Family Goals: RITIKA    Progress made toward(s) clinical / shift goals:  Patient resting in bed.    Patient is not progressing towards the following goals: Continues to refused tele box.

## 2024-10-03 PROCEDURE — 700102 HCHG RX REV CODE 250 W/ 637 OVERRIDE(OP): Performed by: INTERNAL MEDICINE

## 2024-10-03 PROCEDURE — 700102 HCHG RX REV CODE 250 W/ 637 OVERRIDE(OP): Performed by: STUDENT IN AN ORGANIZED HEALTH CARE EDUCATION/TRAINING PROGRAM

## 2024-10-03 PROCEDURE — 99233 SBSQ HOSP IP/OBS HIGH 50: CPT | Performed by: STUDENT IN AN ORGANIZED HEALTH CARE EDUCATION/TRAINING PROGRAM

## 2024-10-03 PROCEDURE — A9270 NON-COVERED ITEM OR SERVICE: HCPCS | Performed by: STUDENT IN AN ORGANIZED HEALTH CARE EDUCATION/TRAINING PROGRAM

## 2024-10-03 PROCEDURE — A9270 NON-COVERED ITEM OR SERVICE: HCPCS | Performed by: INTERNAL MEDICINE

## 2024-10-03 PROCEDURE — G0378 HOSPITAL OBSERVATION PER HR: HCPCS

## 2024-10-03 RX ORDER — LORAZEPAM 2 MG/1
2 TABLET ORAL
Status: COMPLETED | OUTPATIENT
Start: 2024-10-03 | End: 2024-10-03

## 2024-10-03 RX ORDER — LORAZEPAM 1 MG/1
1 TABLET ORAL 2 TIMES DAILY
Status: COMPLETED | OUTPATIENT
Start: 2024-10-03 | End: 2024-10-03

## 2024-10-03 RX ADMIN — LOSARTAN POTASSIUM 25 MG: 25 TABLET, FILM COATED ORAL at 06:19

## 2024-10-03 RX ADMIN — SENNOSIDES AND DOCUSATE SODIUM 2 TABLET: 50; 8.6 TABLET ORAL at 17:52

## 2024-10-03 RX ADMIN — GEMFIBROZIL 600 MG: 600 TABLET ORAL at 09:08

## 2024-10-03 RX ADMIN — LORAZEPAM 1 MG: 1 TABLET ORAL at 06:19

## 2024-10-03 RX ADMIN — LITHIUM CARBONATE 900 MG: 300 TABLET ORAL at 20:18

## 2024-10-03 RX ADMIN — LORAZEPAM 1 MG: 1 TABLET ORAL at 13:52

## 2024-10-03 RX ADMIN — LORAZEPAM 2 MG: 2 TABLET ORAL at 20:18

## 2024-10-03 RX ADMIN — LORAZEPAM 1 MG: 1 TABLET ORAL at 00:57

## 2024-10-03 RX ADMIN — LURASIDONE HYDROCHLORIDE 120 MG: 40 TABLET, FILM COATED ORAL at 20:18

## 2024-10-03 RX ADMIN — GEMFIBROZIL 600 MG: 600 TABLET ORAL at 20:18

## 2024-10-03 RX ADMIN — LEVOTHYROXINE SODIUM 112 MCG: 0.11 TABLET ORAL at 06:19

## 2024-10-03 ASSESSMENT — ENCOUNTER SYMPTOMS
MUSCULOSKELETAL NEGATIVE: 1
GASTROINTESTINAL NEGATIVE: 1
INSOMNIA: 1
TINGLING: 1
DIZZINESS: 1
NERVOUS/ANXIOUS: 1
CONSTITUTIONAL NEGATIVE: 1
RESPIRATORY NEGATIVE: 1
CARDIOVASCULAR NEGATIVE: 1
EYES NEGATIVE: 1

## 2024-10-03 ASSESSMENT — FIBROSIS 4 INDEX: FIB4 SCORE: 1.05

## 2024-10-03 ASSESSMENT — PAIN DESCRIPTION - PAIN TYPE
TYPE: ACUTE PAIN
TYPE: ACUTE PAIN

## 2024-10-03 NOTE — CONSULTS
"PSYCHIATRIC FOLLOW-UP: (established)  PSYCHIATRIC FOLLOW-UP: (established)  Reason for admission:   Multiple GLF  Legal Hold Status:      court committed       Chart reviewed.       HPI:          Patient reports that He is doing okay today.  Reports that he has a little less energy.  Slept longer overnight, but felt his sleep is more interrupted.  Declines knowledge that he declined his evening dose of Ativan.  Denies hallucinations today but does state that his paranoia continues to be present particularly during shift change.  States that he feels people are talking about him behind his back and perhaps judging him.  Outside of that shift change who states his anxiety is around 2 out of 10.  Continues to complain of dizziness when standing but somewhat improved.  Also endorses anxiety about returning to [facility] adult mental health services as he is concerned that he may derail his sleep    Additionally he reports that he has fear of receiving injections.  Typically when he asks for as needed Ativan it takes significant amount of time at the psychiatric facility, thus escalating to the need for IM PRNs      Medical ROS (as pertinent):     Review of Systems   Constitutional: Negative.    HENT: Negative.     Eyes: Negative.    Respiratory: Negative.     Cardiovascular: Negative.    Gastrointestinal: Negative.    Genitourinary: Negative.    Musculoskeletal: Negative.    Skin: Negative.    Neurological:  Positive for dizziness (when standing up to fast) and tingling.        No changes to lower extremity \"tingling\"   Endo/Heme/Allergies: Negative.    Psychiatric/Behavioral:  Negative for suicidal ideas. The patient is nervous/anxious and has insomnia.            Psychiatric Examination:  Vitals:   Vitals:    10/03/24 0807   BP: 111/67   Pulse: 60   Resp: 18   Temp: 36.3 °C (97.3 °F)   SpO2: 95%      See HPI for orthostatic vitals    General Appearance: Appears state age, appropriate grooming & hygiene, no acute " "distress, concerned about when he will be able to leave the hospital and length of legal hold  Behavior:    -Appropriate activity, appropriate eye contact, pleasant & cooperative   -No tremors noted   -No psychomotor agitation or retardation   -No posture or gait abnormalities appreciated  Speech: Appropriate quantity, spontaneous. Regular rate and rhythm. Appropriate volume.  Some monotone noted. Articulation is clear  Language: English  Thought processes: Coherent, linear, logical and goal-directed. No evidence of loose associations  Thought content: denies SI/HI/AVH. Not observed responding to internal stimuli. Paranoid thoughts that voices in the hallway and the TV are talking poorly about him and mocking him  Mood: \"Good\" as stated by patient  Affect: Congruent with stated mood.  Wanted affect   judgement and Insight and Impulse Control:Limited/Limited/Limited  Cognition:    -Alert & oriented x 3 (Person, place and time)   -Attention & concentration grossly intact   -Immediate/delayed memory not formally tested but grossly intact   -Age-appropriate fund of knowledge      New PAST MEDICAL/PSYCH/FAMILY/SOCIAL(as reported by patient):       None      EKG:   Results for orders placed or performed during the hospital encounter of 24   EKG (NOW)   Result Value Ref Range    Report       [facility] Emergency Dept.    Test Date:  2024  Pt Name:    LORENZO DOMINGUEZ                  Department: ER  MRN:        [id number]                      Room:       T734  Gender:     Male                         Technician: [id number]  :        1985                   Requested By:MEGGAN JOYCE  Order #:    [id number]                    Reading MD: MEGGAN JOYCE MD    Measurements  Intervals                                Axis  Rate:       69                           P:          -38  MA:         151                          QRS:        59  QRSD:       102                          T:          " 39  QT:         406  QTc:        435    Interpretive Statements  Sinus rhythm  ST elev, probable normal early repol pattern  Baseline wander in lead(s) V1,V2,V6  No previous ECG available for comparison  Electronically Signed On 09- 22:55:01 PDT by MEGGAN JOYCE MD        Brain Imaging:   CT W/O 9/26   result within normal limits   EEG:      EEG on 9/28 showed normal brain activity and awake and drowsy state.     24 Hr EEG couldn't be finished due to pt removing the monitor during the night; no signs of seizures before removal    Labs personally reviewed:   No results found for this or any previous visit (from the past 24 hour(s)).        Assessment:  Patient responding well to decrease in Zyprexa.  No worsening of paranoia with the discontinue of Zyprexa at this time.  Some improvement in paranoia and insight is noted.  Baseline anxiety is also improving with adjunct of Ativan.  Given this response, it appears the risks of dual antipsychotics do not outweigh the benefits and recommending allowing therapeutic duration of Latuda and lithium to address manic mixed features and scheduling Ativan for breakthrough symptoms until this can be achieved.  This will also limit orthostasis  .    Pending medical clearance and review of MRI before transfer back to psychiatric facility    Dx:  1.Bipolar 1       Medical :  Principal Problem:    Seizure (HCC) (POA: Yes)  Active Problems:    Encephalopathy (POA: Yes)    Syncope (POA: Unknown)    Suicidal ideations (POA: Unknown)    Rhabdomyolysis (POA: Unknown)    Hypertension (POA: Unknown)    Hypothyroidism (POA: Unknown)    Anxiety (POA: Yes)    Psychosis (HCC) (POA: Yes)    Ground-level fall (POA: Yes)    Insomnia due to other mental disorder (POA: Yes)    Marijuana use (POA: Yes)    Abrasion of skin of lip (POA: Yes)  Resolved Problems:    * No resolved hospital problems. *            Plan:  Legal hold: Court committed  Psychotropic medications  INCREASE Ativan from  1 mg every morning and 2 mg nightly to 1 mg every morning and 1mg q. afternoon and 2 mg nightly for anxiety paranoia yamila and sleep  Continue melatonin 5 mg p.o. nightly as needed for sleep.  Ensure melatonin is given at or before 8 PM to prevent phase delay and worsening of insomnia  Continue lithium 900 mg nightly, lithium level: 0.8 on 10/1  Continue Latuda 120 mg p.o. daily  Continue as needed Ativan  Continue as needed hydroxyzine  Advised against watching TV due to exacerbation of paranoia  Please transfer pt to inpatient psychiatric hospital when medically cleared and bed is available  Labs reviewed  EKG reviewed  Discussed the case with:   Psychiatry will follow up  Thank you for the consult.         Sitter: 1:1  Phone: family ok  Visitors: family ok  Personal belongings: Books okay

## 2024-10-03 NOTE — PROGRESS NOTES
Hospital Medicine Daily Progress Note    Date of Service  10/2/2024    Chief Complaint  Lee Yadav is a 39 y.o. male admitted 9/26/2024 with altered mental status with concern for seizure.    Hospital Course  Lee Yadav is a 39 y.o. male with history of bipolar disorder who presented 9/26/2024 from psychiatric facility [facility], where he has been hospitalized for his psychiatric illness, with concern for periods of altered mentation in the last 2 days.    Per ERP Dr. Bentley who spoke to MD at psychiatric facility, patient would become nonverbal and nonresponsive, looking with a blank stare, will track but would not respond to questions.  Patient states that he had several syncopal episode, most recently on day of admission when he was feeling dizzy, passed out and fell hitting his right hip.    he was started on Thorazine 2 days prior to admission  Denies any GI illness.  UDS: Cannabinoids, benzodiazepines.  CT head without contrast negative.  Pelvic x-ray: Degenerative changes in the sacroiliac joints.  EKG: Sinus rhythm heart rate 69, no significant T/ST fluctuations, QTc is not prolonged.  Multiple psychiatric medications noted in the patient outpatient medications list: Thorazine, lithium, Latuda, olanzapine, trazodone    Was expressing paranoid delusions, refused EEG initially. Spot extended to 22 hours, patient removed leads at that time. Had another syncopal episode and injured face.   Decreased Zyprexa to 10 mg by daily.  Started melatonin 5 mg by mouth at bedtime as needed for insomnia.  Behavioral health consult placed.  Discussed with Dr. Denis of psychiatry.    Interval Problem Update  10/1/2024  Afebrile pulse 59-62 respiratory rate 16 systolic blood pressure 110-150s pulse ox 96-98% on room air.  EEG did not show any seizure activity or evidence of recent seizures.  Briefly discussed with neurology, recommended obtaining echo to complete syncope workup.  Due to echo results showing  normal RV size with preserved function however hypokinesis of the free wall and hypokinetic apex, recommendation to consider excluding PE and if negative obtain cardiac MRI for better assessment evaluation of the RV.  I ordered a D-dimer which was undetectable, will proceed with cardiac MRI.  Initiating AEDs not recommended by neurology at this time, does recommend one-time follow-up in their seizure clinic.    10/2  Afebrile pulse 60s to 80s normal respiratory rate and blood pressure pulse ox 91 to 97% on room air.  Patient went for his cardiac MRI later in the afternoon, results still pending, if no actionable findings will be medically cleared for discharge back to inpatient psychiatric facility, if any actionable findings will consult cardiology.  No further syncopal episodes, no suspected seizure activity.  Discussed with patient and family at bedside. Discussed with psychiatry today, discontinue olanzapine, schedule ativan, discontinue restoril.     I have discussed this patient's plan of care and discharge plan at IDT rounds today with Case Management, Nursing, Nursing leadership, and other members of the IDT team.    Consultants/Specialty  psychiatry    Code Status  Full Code    Disposition  The patient is not medically cleared for discharge to home or a post-acute facility.      I have placed the appropriate orders for post-discharge needs.    Review of Systems  Review of Systems   Constitutional:  Negative for chills and fever.   Respiratory:  Negative for cough and shortness of breath.    Cardiovascular:  Negative for chest pain and palpitations.   Gastrointestinal:  Negative for abdominal pain, nausea and vomiting.   Musculoskeletal:  Negative for falls, joint pain and myalgias.   Neurological:  Negative for dizziness and headaches.   Psychiatric/Behavioral:  Negative for hallucinations. The patient is nervous/anxious.         Physical Exam  Temp:  [36.3 °C (97.3 °F)-36.7 °C (98.1 °F)] 36.7 °C (98.1  °F)  Pulse:  [60-80] 63  Resp:  [16-18] 18  BP: (110-138)/(43-88) 110/43  SpO2:  [91 %-97 %] 91 %    Physical Exam  Vitals and nursing note reviewed. Exam conducted with a chaperone present.   Constitutional:       General: He is not in acute distress.     Appearance: He is not ill-appearing or toxic-appearing.   HENT:      Head: Normocephalic and atraumatic.      Comments: EEG leads in place     Nose: Nose normal. No congestion.      Mouth/Throat:      Mouth: Mucous membranes are moist.      Pharynx: Oropharynx is clear. No oropharyngeal exudate.      Comments: Healing abrasion to the left upper lip  Eyes:      General: No scleral icterus.        Right eye: No discharge.         Left eye: No discharge.      Conjunctiva/sclera: Conjunctivae normal.   Cardiovascular:      Rate and Rhythm: Normal rate and regular rhythm.      Pulses: Normal pulses.      Heart sounds: Normal heart sounds. No murmur heard.  Pulmonary:      Effort: Pulmonary effort is normal. No respiratory distress.      Breath sounds: Normal breath sounds.   Abdominal:      General: Abdomen is flat. Bowel sounds are normal. There is no distension.      Palpations: Abdomen is soft.   Musculoskeletal:         General: No swelling.      Cervical back: Neck supple. No tenderness.      Right lower leg: No edema.      Left lower leg: No edema.   Skin:     General: Skin is warm and dry.   Neurological:      Mental Status: He is alert and oriented to person, place, and time. Mental status is at baseline.      Motor: No weakness.   Psychiatric:         Attention and Perception: Attention normal.         Mood and Affect: Mood is anxious. Affect is blunt and flat.         Speech: Speech normal.         Behavior: Behavior normal. Behavior is cooperative.         Thought Content: Thought content is paranoid.         Cognition and Memory: Cognition normal.         Judgment: Judgment normal.         Fluids    Intake/Output Summary (Last 24 hours) at 10/2/2024  2225  Last data filed at 10/2/2024 1400  Gross per 24 hour   Intake 440 ml   Output --   Net 440 ml          Laboratory                            Imaging  EC-ECHOCARDIOGRAM COMPLETE W/O CONT   Final Result      CT-HEAD W/O   Final Result      Normal CT of the head without IV contrast.               DX-PELVIS-1 OR 2 VIEWS   Final Result      1. No acute post traumatic imaging findings.   2. Moderate degenerative changes involving the sacroiliac joints, greater than expected for the age of the patient.      DX-CHEST-PORTABLE (1 VIEW)   Final Result      No acute cardiac or pulmonary abnormalities are identified.      MR-CARDIAC MORPH/FUNC WITH & W/O    (Results Pending)        Assessment/Plan  * Seizure (HCC)- (present on admission)  Assessment & Plan  9/29/2024  Seizure versus pseudoseizure.  Prolactin is elevated suggestive of a actual seizure.  CPK was also elevated however can be related to fall.  Spot EEG was negative  I have ordered 24-hour continuous EEG monitoring, patient is agreeable.    9/30/2024  Undergoing 24-hour EEG monitoring.    Abrasion of skin of lip- (present on admission)  Assessment & Plan  9/29/2024  Secondary to ground-level fall in the hospital.  I ordered bacitracin    Marijuana use- (present on admission)  Assessment & Plan  9/28/2024  Urine drug screen positive for cannabinoids.  May be contributing to anxiety and paranoia.    Insomnia due to other mental disorder- (present on admission)  Assessment & Plan  9/28/2024  Ongoing insomnia.  Continue Benadryl as needed at bedtime  Meditative breathing techniques and restoration of circadian rhythm encouraged.    9/30/2024  Ordering melatonin 5 mg at bedtime as needed    Ground-level fall- (present on admission)  Assessment & Plan  9/28/2024  Occurred today following staring episode.  It is face onto the foot of the bed resulting in laceration of his upper gum and abrasion to the left upper lip.  Resuming continuous cardiac monitoring to monitor  for arrhythmias    Psychosis (HCC)- (present on admission)  Assessment & Plan  9/27/2024  Severe paranoid vs auditory hallucinations.  Psychiatry recommendations reviewed and implemented.  Increasing lithium to 900 mg QHS  Changing olanzapine to 10 mg PO BID. Plan to taper.  Hydroxyzine as needed for anxiety.  Ativan as needed for breakthrough anxiety.     9/28/2024  Continue current medications  Receiving Ativan as needed yesterday along with Droxia seen and Benadryl for sleep, which improved his sleep.    9/29/2024  Appears to be improving on current medications.    9/30/2024  Decreasing Zyprexa to 10 mg by daily.  Continue lithium 900 mg at bedtime.  Check lithium level tomorrow.  Starting melatonin 5 mg by mouth at bedtime as needed for insomnia.  Behavioral health consult placed.  Discussed with Dr. Denis of psychiatry.    Anxiety- (present on admission)  Assessment & Plan  9/27/2024  Continue hydroxyzine as needed.  Ativan as needed for breakthrough anxiety.    9/28/2024  Continue hydroxyzine and Ativan    Hypothyroidism  Assessment & Plan  Continue levothyroxine  Recheck TSH    9/28/2024  TSH of 2.08.  Within normal limits.  Continue levothyroxine 112 mcg daily.    Hypertension  Assessment & Plan  Continue losartan  Monitor blood pressure    9/27/2024  Stable.  Continue losartan 25 mg daily    Rhabdomyolysis  Assessment & Plan  Traumatic versus nontraumatic  IV hydration  Repeat CPK  If goes up, consider holding antipsychotics.  Check lithium level    9/27/2024  Improving to 437  Check level tomorrow  Question whether related to seizure.    Suicidal ideations  Assessment & Plan  Patient is vague in regards to questioning for suicidal ideations  He has been on legal hold  Will put suicidal precautions    9/27/2024  Continues with one to one sitter with suicide precautions.    9/29/2024  Continues on one-to-one sitter with suicide precautions.    Syncope  Assessment & Plan  Suspected side effect of  antipsychotics, causing ineffective postural vascular tone reflex, orthostatic hypotension, orthostatic syncope  EKG does not show QTc prolongation  Will monitor on telemetry for arrhythmia  And give bolus of saline 1 L  Orthostatic vitals    9/27/2024  Continue telemetry monitoring.    9/28/2024  Patient was discontinued telemetry after which he had a ground-level fall following a staring episode.  I have reinitiated continuous telemetry monitoring.  Does not appear to be cardiogenic in nature.  Suspect possible seizures.  EEG has been ordered.    9/29/2024  No further episodes.  Continue continuous cardiac monitoring  Spot EEG was negative  I have ordered 24-hour continuous EEG monitoring    9/30/2024  No further syncope episodes.  Patient agreeable to continuous EEG monitoring and continuous telemetry monitoring.    Encephalopathy- (present on admission)  Assessment & Plan  Differential diagnosis may include polypharmacy and side effects of his psychiatric medications, given added Thorazine 2 days ago, and catatonic state, non convulsive seizure  CT head without contrast is negative  Prolactin was noted is elevated which is nonspecific, and could be elevated as a side effect of neuroleptics, as well as after seizures  CPK is elevated that could be result of muscle injury or again side effects of antipsychotics.  However there is no increased muscle tone on exam  Plan: Evaluate patient with EEG.  Check ammonia, TSH, vitamin B12, lithium level  Consider psychiatric consultation for medications management given concern for side effect and polypharmacy    9/27/2024  Appears to be improving.  Alert and oriented x3.  States he from [place] and in [place] due to psychiatric issues.  Not sure if he wants to go back to [facility].  Agreeable to EEG.  Discussed with EEG technician.  Discussed with Dr. Rushing of psychiatry.    9/28/2024  Alert and orient x 3.  Had a ground-level fall after staring episode.  He was just taken off  telemetry monitoring.  I discussed with the EEG technician about obtaining urgent EEG.    9/30/2024  Continues to be alert and orient x 3.    9/29/2024  Alert and orient x 3.  Improving.         VTE prophylaxis:    enoxaparin ppx      I have performed a physical exam and reviewed and updated ROS and Plan today (10/2/2024). In review of yesterday's note (10/1/2024), there are no changes except as documented above.  Total time spent 54 minutes. I spent greater than 50% of the time for patient care, counseling, and coordination on this date, including unit/floor time, and face-to-face time with the patient as per interval events, my own review of patient's imaging and lab analysis and developing my assessment and plan above.

## 2024-10-03 NOTE — CARE PLAN
The patient is Stable - Low risk of patient condition declining or worsening    Shift Goals  Clinical Goals: Legal hold, safety, diagnostic results  Patient Goals: rest, comfort  Family Goals: pippa    Progress made toward(s) clinical / shift goals:    Problem: Knowledge Deficit - Standard  Goal: Patient and family/care givers will demonstrate understanding of plan of care, disease process/condition, diagnostic tests and medications  Outcome: Progressing     Problem: Pain - Standard  Goal: Alleviation of pain or a reduction in pain to the patient’s comfort goal  Outcome: Progressing     Problem: Depression  Goal: Patient and family/caregiver will verbalize accurate information about at least two of the possible causes of depression, three-four of the signs and symptoms of depression  Outcome: Progressing     Problem: Safety:  Goal: Will remain free from injury  Outcome: Progressing     Problem: Psychosocial Needs:  Goal: Ability to identify and develop effective coping behavior will improve  Outcome: Progressing  Goal: Ability to verbalize positive feelings about self will improve  Outcome: Progressing     Problem: Health Behavior:  Goal: Ability to identify appropriate support needs will improve  Outcome: Progressing  Goal: Ability to identify and utilize available support systems will improve  Outcome: Progressing     Problem: Fall Risk  Goal: Patient will remain free from falls  Outcome: Progressing     Problem: Provide Safe Environment  Goal: Suicide environmental safety, protocols, policies, and practices will be implemented  Outcome: Progressing     Problem: Psychosocial  Goal: Patient's ability to identify and develop effective coping behaviors will improve  Outcome: Progressing  Goal: Patient's ability to identify and utilize available support systems will improve  Outcome: Progressing     Problem: Safety - Medical Restraint  Goal: Remains free of injury from restraints (Restraint for Interference with Medical  Device)  Outcome: Progressing  Goal: Free from restraint(s) (Restraint for Interference with Medical Device)  Outcome: Progressing       Patient is not progressing towards the following goals:

## 2024-10-03 NOTE — CARE PLAN
The patient is Stable - Low risk of patient condition declining or worsening    Shift Goals  Clinical Goals: legal hold, cardiac mri results  Patient Goals: comfort, rest  Family Goals: RITIKA    Progress made toward(s) clinical / shift goals:    Problem: Knowledge Deficit - Standard  Goal: Patient and family/care givers will demonstrate understanding of plan of care, disease process/condition, diagnostic tests and medications  Outcome: Progressing     Problem: Depression  Goal: Patient and family/caregiver will verbalize accurate information about at least two of the possible causes of depression, three-four of the signs and symptoms of depression  Outcome: Progressing     Problem: Safety:  Goal: Will remain free from injury  Outcome: Progressing     Problem: Psychosocial Needs:  Goal: Ability to identify and develop effective coping behavior will improve  Outcome: Progressing  Goal: Ability to verbalize positive feelings about self will improve  Outcome: Progressing     Problem: Health Behavior:  Goal: Ability to identify appropriate support needs will improve  Outcome: Progressing  Goal: Ability to identify and utilize available support systems will improve  Outcome: Progressing     Problem: Fall Risk  Goal: Patient will remain free from falls  Outcome: Progressing     Problem: Provide Safe Environment  Goal: Suicide environmental safety, protocols, policies, and practices will be implemented  Outcome: Progressing     Problem: Psychosocial  Goal: Patient's ability to identify and develop effective coping behaviors will improve  Outcome: Progressing  Goal: Patient's ability to identify and utilize available support systems will improve  Outcome: Progressing     Problem: Safety - Medical Restraint  Goal: Remains free of injury from restraints (Restraint for Interference with Medical Device)  Outcome: Progressing  Goal: Free from restraint(s) (Restraint for Interference with Medical Device)  Outcome: Progressing        Patient is not progressing towards the following goals:

## 2024-10-03 NOTE — PROGRESS NOTES
Received bedside report from off going RN. Introduced self to patient and assumed patient care. Safety sitter at bedside, safe room checklist completed. VSS at this time. Patient resting in bed. Call light in reach and fall precautions in place. Hourly rounding initiated.

## 2024-10-03 NOTE — DISCHARGE PLANNING
Case Management Discharge Planning    Admission Date: 9/26/2024  GMLOS:    ALOS: 0    6-Clicks ADL Score: 24  6-Clicks Mobility Score: 24      Anticipated Discharge Dispo: Discharge Disposition: D/T to psych hosp or distinct part unit (65)    DME Needed: No    Action(s) Taken: Updated Provider/Nurse on Discharge Plan    1100, Pt was discussed in IDT rounds. Pt is not medically cleared yet, Dr. Farah is waiting for Cardiac MRI results.     Escalations Completed: None    Medically Clear: No    Next Steps: CM will continue to assist Pt with discharge needs.      Barriers to Discharge: Medical clearance    Is the patient up for discharge tomorrow: No

## 2024-10-04 PROCEDURE — 700102 HCHG RX REV CODE 250 W/ 637 OVERRIDE(OP): Performed by: INTERNAL MEDICINE

## 2024-10-04 PROCEDURE — 700102 HCHG RX REV CODE 250 W/ 637 OVERRIDE(OP): Mod: UD | Performed by: STUDENT IN AN ORGANIZED HEALTH CARE EDUCATION/TRAINING PROGRAM

## 2024-10-04 PROCEDURE — 700102 HCHG RX REV CODE 250 W/ 637 OVERRIDE(OP): Performed by: STUDENT IN AN ORGANIZED HEALTH CARE EDUCATION/TRAINING PROGRAM

## 2024-10-04 PROCEDURE — A9270 NON-COVERED ITEM OR SERVICE: HCPCS | Mod: UD | Performed by: STUDENT IN AN ORGANIZED HEALTH CARE EDUCATION/TRAINING PROGRAM

## 2024-10-04 PROCEDURE — G0378 HOSPITAL OBSERVATION PER HR: HCPCS

## 2024-10-04 PROCEDURE — A9270 NON-COVERED ITEM OR SERVICE: HCPCS | Performed by: INTERNAL MEDICINE

## 2024-10-04 PROCEDURE — A9270 NON-COVERED ITEM OR SERVICE: HCPCS | Performed by: STUDENT IN AN ORGANIZED HEALTH CARE EDUCATION/TRAINING PROGRAM

## 2024-10-04 RX ORDER — LORAZEPAM 1 MG/1
2 TABLET ORAL
Status: DISCONTINUED | OUTPATIENT
Start: 2024-10-04 | End: 2024-10-08 | Stop reason: HOSPADM

## 2024-10-04 RX ORDER — LORAZEPAM 1 MG/1
1 TABLET ORAL 2 TIMES DAILY
Status: DISCONTINUED | OUTPATIENT
Start: 2024-10-04 | End: 2024-10-08 | Stop reason: HOSPADM

## 2024-10-04 RX ORDER — LORAZEPAM 1 MG/1
1 TABLET ORAL ONCE
Status: COMPLETED | OUTPATIENT
Start: 2024-10-04 | End: 2024-10-04

## 2024-10-04 RX ADMIN — LORAZEPAM 1 MG: 1 TABLET ORAL at 14:46

## 2024-10-04 RX ADMIN — HYDROXYZINE HYDROCHLORIDE 50 MG: 50 TABLET, FILM COATED ORAL at 05:10

## 2024-10-04 RX ADMIN — LEVOTHYROXINE SODIUM 112 MCG: 0.11 TABLET ORAL at 05:07

## 2024-10-04 RX ADMIN — GEMFIBROZIL 600 MG: 600 TABLET ORAL at 07:59

## 2024-10-04 RX ADMIN — LITHIUM CARBONATE 900 MG: 300 TABLET ORAL at 20:37

## 2024-10-04 RX ADMIN — LURASIDONE HYDROCHLORIDE 120 MG: 40 TABLET, FILM COATED ORAL at 20:36

## 2024-10-04 RX ADMIN — LORAZEPAM 2 MG: 1 TABLET ORAL at 20:36

## 2024-10-04 RX ADMIN — LORAZEPAM 1 MG: 1 TABLET ORAL at 08:00

## 2024-10-04 RX ADMIN — LOSARTAN POTASSIUM 25 MG: 25 TABLET, FILM COATED ORAL at 05:07

## 2024-10-04 RX ADMIN — GEMFIBROZIL 600 MG: 600 TABLET ORAL at 20:37

## 2024-10-04 ASSESSMENT — PAIN DESCRIPTION - PAIN TYPE: TYPE: ACUTE PAIN

## 2024-10-04 ASSESSMENT — ENCOUNTER SYMPTOMS
BLURRED VISION: 0
NERVOUS/ANXIOUS: 0
DIZZINESS: 1
HEARTBURN: 0
COUGH: 0
SHORTNESS OF BREATH: 0
HEADACHES: 0
CHILLS: 0
NAUSEA: 0
FEVER: 0
PALPITATIONS: 0
NERVOUS/ANXIOUS: 1
VOMITING: 0
ABDOMINAL PAIN: 0
HALLUCINATIONS: 0
DEPRESSION: 0
DIZZINESS: 0
MYALGIAS: 0
FALLS: 0

## 2024-10-04 NOTE — DISCHARGE PLANNING
Case Management Discharge Planning    Admission Date: 9/26/2024  GMLOS:    ALOS: 0    6-Clicks ADL Score: 24  6-Clicks Mobility Score: 24      Anticipated Discharge Dispo: Discharge Disposition: D/T to psych hosp or distinct part unit (65)    DME Needed: No    Action(s) Taken: Updated Provider/Nurse on Discharge Plan    1100, Pt was discussed in IDT rounds. Per Dr. Farah Pt is medically cleared for discharge to [facility].    1548, IDT team informed through [employer] that per Alert team,  admission to [facility] has been moved to Tuesday 10/8 due to construction on the unit.     Escalations Completed: None    Medically Clear: Yes    Next Steps: CM will continue to assist Pt with discharge needs.      Barriers to Discharge: Pending Placement    Is the patient up for discharge tomorrow: No

## 2024-10-04 NOTE — CARE PLAN
The patient is Stable - Low risk of patient condition declining or worsening    Shift Goals  Clinical Goals: legal hold, safety, dc  Patient Goals: rest, updates, dc  Family Goals: updates    Progress made toward(s) clinical / shift goals:    Problem: Knowledge Deficit - Standard  Goal: Patient and family/care givers will demonstrate understanding of plan of care, disease process/condition, diagnostic tests and medications  Outcome: Progressing     Problem: Pain - Standard  Goal: Alleviation of pain or a reduction in pain to the patient’s comfort goal  Outcome: Progressing     Problem: Depression  Goal: Patient and family/caregiver will verbalize accurate information about at least two of the possible causes of depression, three-four of the signs and symptoms of depression  Outcome: Progressing     Problem: Safety:  Goal: Will remain free from injury  Outcome: Progressing     Problem: Psychosocial Needs:  Goal: Ability to identify and develop effective coping behavior will improve  Outcome: Progressing  Goal: Ability to verbalize positive feelings about self will improve  Outcome: Progressing     Problem: Health Behavior:  Goal: Ability to identify appropriate support needs will improve  Outcome: Progressing  Goal: Ability to identify and utilize available support systems will improve  Outcome: Progressing     Problem: Fall Risk  Goal: Patient will remain free from falls  Outcome: Progressing     Problem: Provide Safe Environment  Goal: Suicide environmental safety, protocols, policies, and practices will be implemented  Outcome: Progressing     Problem: Psychosocial  Goal: Patient's ability to identify and develop effective coping behaviors will improve  Outcome: Progressing  Goal: Patient's ability to identify and utilize available support systems will improve  Outcome: Progressing     Problem: Safety - Medical Restraint  Goal: Remains free of injury from restraints (Restraint for Interference with Medical  Device)  Outcome: Progressing  Goal: Free from restraint(s) (Restraint for Interference with Medical Device)  Outcome: Progressing       Patient is not progressing towards the following goals:

## 2024-10-04 NOTE — PROGRESS NOTES
Hospital Medicine Daily Progress Note    Date of Service  10/3/2024    Chief Complaint  Lee Yadav is a 39 y.o. male admitted 9/26/2024 with altered mental status with concern for seizure.    Hospital Course  Lee Yadav is a 39 y.o. male with history of bipolar disorder who presented 9/26/2024 from psychiatric facility [facility], where he has been hospitalized for his psychiatric illness, with concern for periods of altered mentation in the last 2 days.    Per ERP Dr. Bentley who spoke to MD at psychiatric facility, patient would become nonverbal and nonresponsive, looking with a blank stare, will track but would not respond to questions.  Patient states that he had several syncopal episode, most recently on day of admission when he was feeling dizzy, passed out and fell hitting his right hip.    he was started on Thorazine 2 days prior to admission  Denies any GI illness.  UDS: Cannabinoids, benzodiazepines.  CT head without contrast negative.  Pelvic x-ray: Degenerative changes in the sacroiliac joints.  EKG: Sinus rhythm heart rate 69, no significant T/ST fluctuations, QTc is not prolonged.  Multiple psychiatric medications noted in the patient outpatient medications list: Thorazine, lithium, Latuda, olanzapine, trazodone    Was expressing paranoid delusions, refused EEG initially. Spot extended to 22 hours, patient removed leads at that time. Had another syncopal episode and injured face.   Decreased Zyprexa to 10 mg by daily.  Started melatonin 5 mg by mouth at bedtime as needed for insomnia.  Behavioral health consult placed.  Discussed with Dr. Denis of psychiatry.    Interval Problem Update  10/1/2024  Afebrile pulse 59-62 respiratory rate 16 systolic blood pressure 110-150s pulse ox 96-98% on room air.  EEG did not show any seizure activity or evidence of recent seizures.  Briefly discussed with neurology, recommended obtaining echo to complete syncope workup.  Due to echo results showing  normal RV size with preserved function however hypokinesis of the free wall and hypokinetic apex, recommendation to consider excluding PE and if negative obtain cardiac MRI for better assessment evaluation of the RV.  I ordered a D-dimer which was undetectable, will proceed with cardiac MRI.  Initiating AEDs not recommended by neurology at this time, does recommend one-time follow-up in their seizure clinic.    10/2  Afebrile pulse 60s to 80s normal respiratory rate and blood pressure pulse ox 91 to 97% on room air.  Patient went for his cardiac MRI later in the afternoon, results still pending, if no actionable findings will be medically cleared for discharge back to inpatient psychiatric facility, if any actionable findings will consult cardiology.  No further syncopal episodes, no suspected seizure activity.  Discussed with patient and family at bedside. Discussed with psychiatry today, discontinue olanzapine, schedule ativan, discontinue restoril.     10/3  Cardiac MRI resulted in afternoon and normal, no RV dysfunction. Mecially cleared for discharge back to behavioral health facility for continued care. Adjusted ativasn regimen after discussing with psychiatry. No new complaints, up walking around unit tolerating treatments well.  Seems less paranoid and more involved in our conversation today, parents also noted an improvement.    I have discussed this patient's plan of care and discharge plan at IDT rounds today with Case Management, Nursing, Nursing leadership, and other members of the IDT team.    Consultants/Specialty  psychiatry    Code Status  Full Code    Disposition  The patient is medically cleared for discharge to home or a post-acute facility.  Anticipate discharge to: a psychiatric hospital    I have placed the appropriate orders for post-discharge needs.    Review of Systems  Review of Systems   Constitutional:  Negative for chills and fever.   Respiratory:  Negative for cough and shortness of  breath.    Cardiovascular:  Negative for chest pain and palpitations.   Gastrointestinal:  Negative for abdominal pain, nausea and vomiting.   Musculoskeletal:  Negative for falls, joint pain and myalgias.   Neurological:  Negative for dizziness and headaches.   Psychiatric/Behavioral:  Negative for hallucinations. The patient is nervous/anxious.         Physical Exam  Temp:  [36.3 °C (97.3 °F)-36.5 °C (97.7 °F)] 36.4 °C (97.5 °F)  Pulse:  [60-75] 64  Resp:  [16-20] 20  BP: (111-144)/(54-70) 134/65  SpO2:  [95 %-96 %] 95 %    Physical Exam  Vitals and nursing note reviewed. Exam conducted with a chaperone present.   Constitutional:       General: He is not in acute distress.     Appearance: He is not ill-appearing or toxic-appearing.   HENT:      Head: Normocephalic and atraumatic.      Comments: EEG leads in place     Nose: Nose normal. No congestion.      Mouth/Throat:      Mouth: Mucous membranes are moist.      Pharynx: Oropharynx is clear. No oropharyngeal exudate.      Comments: Healing abrasion to the left upper lip  Eyes:      General: No scleral icterus.        Right eye: No discharge.         Left eye: No discharge.      Conjunctiva/sclera: Conjunctivae normal.   Cardiovascular:      Rate and Rhythm: Normal rate and regular rhythm.      Pulses: Normal pulses.      Heart sounds: Normal heart sounds. No murmur heard.  Pulmonary:      Effort: Pulmonary effort is normal. No respiratory distress.      Breath sounds: Normal breath sounds.   Abdominal:      General: Abdomen is flat. Bowel sounds are normal. There is no distension.      Palpations: Abdomen is soft.   Musculoskeletal:         General: No swelling. Normal range of motion.      Cervical back: Neck supple. No tenderness.      Right lower leg: No edema.      Left lower leg: No edema.   Skin:     General: Skin is warm and dry.   Neurological:      Mental Status: He is alert and oriented to person, place, and time. Mental status is at baseline.       Motor: No weakness.   Psychiatric:         Attention and Perception: Attention normal.         Mood and Affect: Mood is anxious. Affect is blunt and flat.         Speech: Speech normal.         Behavior: Behavior normal. Behavior is cooperative.         Thought Content: Thought content is paranoid.         Cognition and Memory: Cognition normal.         Judgment: Judgment normal.         Fluids    Intake/Output Summary (Last 24 hours) at 10/4/2024 0723  Last data filed at 10/3/2024 0900  Gross per 24 hour   Intake 240 ml   Output --   Net 240 ml          Laboratory                            Imaging  MR-CARDIAC MORPH/FUNC WITH & W/O   Final Result      1.  Normal biventricular size and function. No delayed myocardial enhancement.   2.  No MR findings of right ventricular cardiomyopathy.                           EC-ECHOCARDIOGRAM COMPLETE W/O CONT   Final Result      CT-HEAD W/O   Final Result      Normal CT of the head without IV contrast.               DX-PELVIS-1 OR 2 VIEWS   Final Result      1. No acute post traumatic imaging findings.   2. Moderate degenerative changes involving the sacroiliac joints, greater than expected for the age of the patient.      DX-CHEST-PORTABLE (1 VIEW)   Final Result      No acute cardiac or pulmonary abnormalities are identified.           Assessment/Plan  * Seizure (HCC)- (present on admission)  Assessment & Plan  9/29/2024  Seizure versus pseudoseizure.  Prolactin is elevated suggestive of a actual seizure.  CPK was also elevated however can be related to fall.  Spot EEG was negative  I have ordered 24-hour continuous EEG monitoring, patient is agreeable.    9/30/2024  Undergoing 24-hour EEG monitoring.    Abrasion of skin of lip- (present on admission)  Assessment & Plan  9/29/2024  Secondary to ground-level fall in the hospital.  I ordered bacitracin    Marijuana use- (present on admission)  Assessment & Plan  9/28/2024  Urine drug screen positive for cannabinoids.  May be  contributing to anxiety and paranoia.    Insomnia due to other mental disorder- (present on admission)  Assessment & Plan  9/28/2024  Ongoing insomnia.  Continue Benadryl as needed at bedtime  Meditative breathing techniques and restoration of circadian rhythm encouraged.    9/30/2024  Ordering melatonin 5 mg at bedtime as needed    Ground-level fall- (present on admission)  Assessment & Plan  9/28/2024  Occurred today following staring episode.  It is face onto the foot of the bed resulting in laceration of his upper gum and abrasion to the left upper lip.  Resuming continuous cardiac monitoring to monitor for arrhythmias    Psychosis (HCC)- (present on admission)  Assessment & Plan  9/27/2024  Severe paranoid vs auditory hallucinations.  Psychiatry recommendations reviewed and implemented.  Increasing lithium to 900 mg QHS  Changing olanzapine to 10 mg PO BID. Plan to taper.  Hydroxyzine as needed for anxiety.  Ativan as needed for breakthrough anxiety.     9/28/2024  Continue current medications  Receiving Ativan as needed yesterday along with Droxia seen and Benadryl for sleep, which improved his sleep.    9/29/2024  Appears to be improving on current medications.    9/30/2024  Decreasing Zyprexa to 10 mg by daily.  Continue lithium 900 mg at bedtime.  Check lithium level tomorrow.  Starting melatonin 5 mg by mouth at bedtime as needed for insomnia.  Behavioral health consult placed.  Discussed with Dr. Denis of psychiatry.    Anxiety- (present on admission)  Assessment & Plan  9/27/2024  Continue hydroxyzine as needed.  Ativan as needed for breakthrough anxiety.    9/28/2024  Continue hydroxyzine and Ativan    Hypothyroidism  Assessment & Plan  Continue levothyroxine  Recheck TSH    9/28/2024  TSH of 2.08.  Within normal limits.  Continue levothyroxine 112 mcg daily.    Hypertension  Assessment & Plan  Continue losartan  Monitor blood pressure    9/27/2024  Stable.  Continue losartan 25 mg  daily    Rhabdomyolysis  Assessment & Plan  Traumatic versus nontraumatic  IV hydration  Repeat CPK  If goes up, consider holding antipsychotics.  Check lithium level    9/27/2024  Improving to 437  Check level tomorrow  Question whether related to seizure.    Suicidal ideations  Assessment & Plan  Patient is vague in regards to questioning for suicidal ideations  He has been on legal hold  Will put suicidal precautions    9/27/2024  Continues with one to one sitter with suicide precautions.    9/29/2024  Continues on one-to-one sitter with suicide precautions.    Syncope  Assessment & Plan  Suspected side effect of antipsychotics, causing ineffective postural vascular tone reflex, orthostatic hypotension, orthostatic syncope  EKG does not show QTc prolongation  Will monitor on telemetry for arrhythmia  And give bolus of saline 1 L  Orthostatic vitals    9/27/2024  Continue telemetry monitoring.    9/28/2024  Patient was discontinued telemetry after which he had a ground-level fall following a staring episode.  I have reinitiated continuous telemetry monitoring.  Does not appear to be cardiogenic in nature.  Suspect possible seizures.  EEG has been ordered.    9/29/2024  No further episodes.  Continue continuous cardiac monitoring  Spot EEG was negative  I have ordered 24-hour continuous EEG monitoring    9/30/2024  No further syncope episodes.  Patient agreeable to continuous EEG monitoring and continuous telemetry monitoring.    Encephalopathy- (present on admission)  Assessment & Plan  Differential diagnosis may include polypharmacy and side effects of his psychiatric medications, given added Thorazine 2 days ago, and catatonic state, non convulsive seizure  CT head without contrast is negative  Prolactin was noted is elevated which is nonspecific, and could be elevated as a side effect of neuroleptics, as well as after seizures  CPK is elevated that could be result of muscle injury or again side effects of  antipsychotics.  However there is no increased muscle tone on exam  Plan: Evaluate patient with EEG.  Check ammonia, TSH, vitamin B12, lithium level  Consider psychiatric consultation for medications management given concern for side effect and polypharmacy    9/27/2024  Appears to be improving.  Alert and oriented x3.  States he from [place] and in [place] due to psychiatric issues.  Not sure if he wants to go back to [facility].  Agreeable to EEG.  Discussed with EEG technician.  Discussed with Dr. Rushign of psychiatry.    9/28/2024  Alert and orient x 3.  Had a ground-level fall after staring episode.  He was just taken off telemetry monitoring.  I discussed with the EEG technician about obtaining urgent EEG.    9/30/2024  Continues to be alert and orient x 3.    9/29/2024  Alert and orient x 3.  Improving.         VTE prophylaxis:   SCDs/TEDs      I have performed a physical exam and reviewed and updated ROS and Plan today (10/3/2024). In review of yesterday's note (10/2/2024), there are no changes except as documented above.  Total time spent 54 minutes. I spent greater than 50% of the time for patient care, counseling, and coordination on this date, including unit/floor time, and face-to-face time with the patient as per interval events, my own review of patient's imaging and lab analysis and developing my assessment and plan above.

## 2024-10-04 NOTE — DISCHARGE PLANNING
"Alert Team/Behavioral Health   Note:      - [facility]: Called admissions # [phone] twice and no answer. Called RN Supervisor # [phone] twice, no answer, and left voice message.  @0945: Per Intake RN Dina) at [facility], \"we can plan to take Lee back to [facility] on Monday 10/7 at 10 AM\".  @1129: Per Intake RN Dina), admission has been moved to Tuesday 10/8 due to construction on the unit.  "

## 2024-10-04 NOTE — PROGRESS NOTES
"PSYCHIATRIC FOLLOW-UP:(established)  *Reason for admission:     Encephalopathy [G93.40]  *Legal Hold Status on Admission:            Chart reviewed.         Interval History: States anxiety present due to knowing about relocation to [facility] rated anxiety 3/10.   Dizziness has improved today, paranoia stays the same with mildly intrusive thoughts of people's judgement, however patient states he has been able to self-redirect with psychotherapy skills.  States lorazepam and hydroxyzine has been helping with anxiety. Denies suicidal thoughts, homicidal thoughts. Denies depressive moods. Slept 8 hours last night and feels refreshed this morning.  Changes in appetite.      Medical ROS (as pertinent):     Review of Systems   Constitutional:  Negative for fever.   Eyes:  Negative for blurred vision.   Respiratory:  Negative for cough.    Cardiovascular:  Negative for chest pain and palpitations.   Gastrointestinal:  Negative for heartburn and nausea.   Neurological:  Positive for dizziness.        Standing upright quickly.   Psychiatric/Behavioral:  Negative for depression, hallucinations and suicidal ideas. The patient is not nervous/anxious.          *Psychiatric Examination:  Vitals:    10/04/24 0747   BP: 138/68   Pulse: 62   Resp: 19   Temp: 36.7 °C (98 °F)   SpO2: 96%       General:   - Grooming and hygiene: Appropriate hygiene and grooming  - Apparent distress: NAD   - Behavior: Calm and appropriate  - Eye Contact: Within normal limits  - no psychomotor agitation or retardation  - Participation: Active verbal participation  Orientation: oriented to person, place and time  Mood: \"good\"  Affect: Congruent  Thought Process: Linear logical and goal directed.  Very concrete  Thought Content: Denies suicidal or homicidal ideations, intent or plan.  Positive evidence of paranoia  Perception: Denies auditory or visual hallucinations. No delusions noted, ideas of references may be possible as paranoia is increased during " watching television.  Attention span and concentration: Intact   Speech: Regular rate, volume and prosody  Language: Appropriate   Insight: Good  Judgment: Good  Recent and remote memory: Grossly intact      Current Medications:  Current Facility-Administered Medications   Medication Dose Route Frequency Provider Last Rate Last Admin    LORazepam (Ativan) tablet 1 mg  1 mg Oral BID Salvatore Farah M.D.        LORazepam (Ativan) tablet 2 mg  2 mg Oral QHS Salvatore Farah M.D.        melatonin tablet 5 mg  5 mg Oral HS PRN Sheila Rushing D.O.   5 mg at 10/02/24 1959    lithium carbonate (IR) tablet 900 mg  900 mg Oral QHS Junito Wang M.D.   900 mg at 10/03/24 2018    enoxaparin (Lovenox) inj 40 mg  40 mg Subcutaneous DAILY AT 1800 Vincenzo Castro M.D.   40 mg at 10/02/24 1656    acetaminophen (Tylenol) tablet 650 mg  650 mg Oral Q6HRS PRN Vincenzo Castro M.D.        Pharmacy Consult Request ...Pain Management Review 1 Each  1 Each Other PHARMACY TO DOSE Vincenzo Castro M.D.        oxyCODONE immediate-release (Roxicodone) tablet 2.5 mg  2.5 mg Oral Q3HRS PRN Vincenzo Castro M.D.        Or    oxyCODONE immediate-release (Roxicodone) tablet 5 mg  5 mg Oral Q3HRS PRN Vincenzo Castro M.D.   5 mg at 09/28/24 2326    Or    HYDROmorphone (Dilaudid) injection 0.25 mg  0.25 mg Intravenous Q3HRS PRN Vincenzo Castro M.D.        senna-docusate (Pericolace Or Senokot S) 8.6-50 MG per tablet 2 Tablet  2 Tablet Oral Q EVENING Vincenzo Castro M.D.   2 Tablet at 10/03/24 1752    And    polyethylene glycol/lytes (Miralax) Packet 1 Packet  1 Packet Oral QDAY PRN Vincenzo Castro M.D.        labetalol (Normodyne/Trandate) injection 10 mg  10 mg Intravenous Q4HRS PRN Vincenzo Castro M.D.        ondansetron (Zofran) syringe/vial injection 4 mg  4 mg Intravenous Q4HRS PRN Vincenzo Castro M.D.        ondansetron (Zofran ODT) dispertab 4 mg  4 mg Oral Q4HRS PRN Vincenzo Castro M.D.        promethazine (Phenergan)  tablet 12.5-25 mg  12.5-25 mg Oral Q4HRS PRN Vincenzo Castro M.D.        promethazine (Phenergan) suppository 12.5-25 mg  12.5-25 mg Rectal Q4HRS PRN Vincenzo Castro M.D.        prochlorperazine (Compazine) injection 5-10 mg  5-10 mg Intravenous Q4HRS PRN Vincenzo Castro M.D.        gemfibrozil (Lopid) tablet 600 mg  600 mg Oral BID Vincenzo Castro M.D.   600 mg at 10/04/24 0759    levothyroxine (Synthroid) tablet 112 mcg  112 mcg Oral AM ES Vincenzo Castro M.D.   112 mcg at 10/04/24 0507    losartan (Cozaar) tablet 25 mg  25 mg Oral DAILY Vincenzo Castro M.D.   25 mg at 10/04/24 0507    lurasidone (Latuda) tablet 120 mg  120 mg Oral QHS Vincenzo Castro M.D.   120 mg at 10/03/24 2018    hydrOXYzine HCl (Atarax) tablet 50 mg  50 mg Oral Q6HRS PRN Vincenzo Castro M.D.   50 mg at 10/04/24 0510        Allergies:  Patient has no known allergies.       Labs personally reviewed:   No results found for this or any previous visit (from the past 24 hour(s)).       *EKG:   Results for orders placed or performed during the hospital encounter of 24   EKG (NOW)   Result Value Ref Range    Report       [facility] Emergency Dept.    Test Date:  2024  Pt Name:    LORENZO DOMINGUEZ                  Department: ER  MRN:        [id number]                      Room:       T734  Gender:     Male                         Technician: [id number]  :        1985                   Requested By:MEGGAN JOYCE  Order #:    [id number]                    Reading MD: MEGGAN JOYCE MD    Measurements  Intervals                                Axis  Rate:       69                           P:          -38  GA:         151                          QRS:        59  QRSD:       102                          T:          39  QT:         406  QTc:        435    Interpretive Statements  Sinus rhythm  ST elev, probable normal early repol pattern  Baseline wander in lead(s) V1,V2,V6  No previous ECG  available for comparison  Electronically Signed On 09- 22:55:01 PDT by MEGGAN JOYCE MD         *Imaging: MRI impression normal biventricular size and function of cardiac.  No findings of right ventricular cardiomyopathy      Assessment:   Patient is responding well to cessation of Zyprexa.  No worsening of paranoia and subjective improvement of dizziness upon standing.  Insight continues to be good.  Baseline anxiety is also improving with Ativan, given this good response patient may do better with lorazepam versus dual/triple antipsychotics.  Patient shows some signs of continued disorganization, although improving which strongly suggests not full resolution of his yamila.  Recommended to continue Latuda and lithium to address his manic mixed features in addition with Ativan for breakthrough symptoms of anxiety, and follow up with outpatient psychiatry to be titrated down to his maintenance medication of monotherapy lithium for bipolar 1.    Pending transfer back to [facility].    Dx:  # Bipolar 1 disorder    Medical:  Principal Problem:    Seizure (HCC) (POA: Yes)  Active Problems:    Encephalopathy (POA: Yes)    Syncope (POA: Unknown)    Suicidal ideations (POA: Unknown)    Rhabdomyolysis (POA: Unknown)    Hypertension (POA: Unknown)    Hypothyroidism (POA: Unknown)    Anxiety (POA: Yes)    Psychosis (HCC) (POA: Yes)    Ground-level fall (POA: Yes)    Insomnia due to other mental disorder (POA: Yes)    Marijuana use (POA: Yes)    Abrasion of skin of lip (POA: Yes)  Resolved Problems:    * No resolved hospital problems. *        Plan:  Legal hold: For committed  Psychotropic medications:  Continue Ativan 1 mg every morning, 1 mg every afternoon, and 2 mg at night for paranoia and anxiety during yamila.  Continue melatonin 5 mg at night as needed for sleep.  Please ensure melatonin is given after before 8 PM to prevent phase delay or worsening of insomnia especially in yamila.  Continue lithium 900 mg  nightly, lithium level: 0.8 on 10/1  Continue Latuda 120 mg p.o. daily  Continue as needed Ativan  Continue as needed hydroxyzine  Continue to avoid stimuli that could worsen paranoia, with the caveat of continued psychotherapy techniques to mitigate paranoia worsening.  Please transfer pt to inpatient psychiatric hospital when medically cleared and bed is available  Brief supportive psychotherapy provided (document time, code [id number])  Labs reviewed:  Discussed the case with: Dr. Gagan PANDA  Psychiatry will follow up    Thank you for the consult.       Sitter: 1:1  Phone: family ok  Visitors: family ok  Personal belongings: Books okay    This note was created using voice recognition software (Dragon). The accuracy of the dictation is limited by the abilities of the software. I have reviewed the note prior to signing. However, error related to voice recognition software and /or scribes may still exist and should be interpreted within the appropriate context.

## 2024-10-04 NOTE — CARE PLAN
The patient is Stable - Low risk of patient condition declining or worsening    Shift Goals  Clinical Goals: legal hold, safety  Patient Goals: rest, comfort  Family Goals: pippa    Progress made toward(s) clinical / shift goals:    Problem: Knowledge Deficit - Standard  Goal: Patient and family/care givers will demonstrate understanding of plan of care, disease process/condition, diagnostic tests and medications  Outcome: Progressing     Problem: Depression  Goal: Patient and family/caregiver will verbalize accurate information about at least two of the possible causes of depression, three-four of the signs and symptoms of depression  Outcome: Progressing     Problem: Safety:  Goal: Will remain free from injury  Outcome: Progressing. 1:1 sitter continued for safety. Pt agreeable to plan.      Problem: Psychosocial Needs:  Goal: Ability to identify and develop effective coping behavior will improve  Outcome: Progressing. Encouraged to verbalize when anxious.      Problem: Fall Risk  Goal: Patient will remain free from falls  Outcome: Progressing. Falls precautions in place. Able to ambulate independently w/ SBA      Problem: Provide Safe Environment  Goal: Suicide environmental safety, protocols, policies, and practices will be implemented  Outcome: Progressing. All potentially dangerous items removed from room. Pt remains safe at this time        Patient is not progressing towards the following goals:

## 2024-10-04 NOTE — DISCHARGE PLANNING
Alert Team/Behavioral Health   Note:      Patient is medically cleared. Referral sent to [facility].      [facility] ALERT TEAM DISCHARGE PLANNING NOTE    Date: 10/4/24  Patient Name:  Lee Yadav - 39 y.o. - Discharge Planning  MRN:  [id number]   YOB: 1985  ADMISSION DATE:  9/26/2024     Writer forwarded referral packet for inpatient psychiatric care to the following community providers:  [facility]    Items included in the referral packet:   _x____Face Sheet   _x____Pages 1 and 2 of completed legal hold   _x____Alert Team/Psych Assessment   _x____H&P   _x____UDS   _x____Blood Alcohol   _x____Vital signs   _n/a____Pregnancy Test (if applicable)   _x____Medications List   _x neg 10/1/24____Covid Screen

## 2024-10-05 PROCEDURE — A9270 NON-COVERED ITEM OR SERVICE: HCPCS | Performed by: STUDENT IN AN ORGANIZED HEALTH CARE EDUCATION/TRAINING PROGRAM

## 2024-10-05 PROCEDURE — A9270 NON-COVERED ITEM OR SERVICE: HCPCS | Mod: UD | Performed by: STUDENT IN AN ORGANIZED HEALTH CARE EDUCATION/TRAINING PROGRAM

## 2024-10-05 PROCEDURE — 700102 HCHG RX REV CODE 250 W/ 637 OVERRIDE(OP): Performed by: STUDENT IN AN ORGANIZED HEALTH CARE EDUCATION/TRAINING PROGRAM

## 2024-10-05 PROCEDURE — 99232 SBSQ HOSP IP/OBS MODERATE 35: CPT | Performed by: STUDENT IN AN ORGANIZED HEALTH CARE EDUCATION/TRAINING PROGRAM

## 2024-10-05 PROCEDURE — G0378 HOSPITAL OBSERVATION PER HR: HCPCS

## 2024-10-05 PROCEDURE — A9270 NON-COVERED ITEM OR SERVICE: HCPCS | Mod: UD | Performed by: INTERNAL MEDICINE

## 2024-10-05 PROCEDURE — 700102 HCHG RX REV CODE 250 W/ 637 OVERRIDE(OP): Mod: UD | Performed by: STUDENT IN AN ORGANIZED HEALTH CARE EDUCATION/TRAINING PROGRAM

## 2024-10-05 PROCEDURE — 700102 HCHG RX REV CODE 250 W/ 637 OVERRIDE(OP): Mod: UD | Performed by: INTERNAL MEDICINE

## 2024-10-05 RX ADMIN — GEMFIBROZIL 600 MG: 600 TABLET ORAL at 08:42

## 2024-10-05 RX ADMIN — SENNOSIDES AND DOCUSATE SODIUM 2 TABLET: 50; 8.6 TABLET ORAL at 17:56

## 2024-10-05 RX ADMIN — LORAZEPAM 1 MG: 1 TABLET ORAL at 14:40

## 2024-10-05 RX ADMIN — LORAZEPAM 2 MG: 1 TABLET ORAL at 20:03

## 2024-10-05 RX ADMIN — LURASIDONE HYDROCHLORIDE 120 MG: 40 TABLET, FILM COATED ORAL at 20:04

## 2024-10-05 RX ADMIN — GEMFIBROZIL 600 MG: 600 TABLET ORAL at 20:04

## 2024-10-05 RX ADMIN — HYDROXYZINE HYDROCHLORIDE 50 MG: 50 TABLET, FILM COATED ORAL at 23:53

## 2024-10-05 RX ADMIN — LOSARTAN POTASSIUM 25 MG: 25 TABLET, FILM COATED ORAL at 06:28

## 2024-10-05 RX ADMIN — HYDROXYZINE HYDROCHLORIDE 50 MG: 50 TABLET, FILM COATED ORAL at 06:30

## 2024-10-05 RX ADMIN — LORAZEPAM 1 MG: 1 TABLET ORAL at 08:42

## 2024-10-05 RX ADMIN — LEVOTHYROXINE SODIUM 112 MCG: 0.11 TABLET ORAL at 06:29

## 2024-10-05 RX ADMIN — LITHIUM CARBONATE 900 MG: 300 TABLET ORAL at 20:04

## 2024-10-05 ASSESSMENT — ENCOUNTER SYMPTOMS
NAUSEA: 0
MYALGIAS: 0
FEVER: 0
VOMITING: 0
FALLS: 0
HALLUCINATIONS: 0
COUGH: 0
SHORTNESS OF BREATH: 0
DIZZINESS: 0
HEADACHES: 0
CHILLS: 0
NERVOUS/ANXIOUS: 1
PALPITATIONS: 0
ABDOMINAL PAIN: 0

## 2024-10-05 ASSESSMENT — PAIN DESCRIPTION - PAIN TYPE
TYPE: ACUTE PAIN
TYPE: ACUTE PAIN

## 2024-10-05 ASSESSMENT — FIBROSIS 4 INDEX: FIB4 SCORE: 1.05

## 2024-10-05 NOTE — DISCHARGE PLANNING
Alert Team/Behavioral Health   Note:        [facility] for [facility]. [facility] ambulance transport to be arranged on Tue 10/8 when [facility] gives transport time.

## 2024-10-05 NOTE — PROGRESS NOTES
Received bedside report from RN Tamela, pt care assumed. VSD, pt assessment complete. Pt A&Ox4. POC discussed with pt and verbalizes no questions. Pt denies any additional needs at this time. Bed locked and in lowest position. Pt educated on fall risk and verbalized understanding, call light within reach, hourly rounding initiated. Patient safety checklist completed.

## 2024-10-05 NOTE — CARE PLAN
Problem: Knowledge Deficit - Standard  Goal: Patient and family/care givers will demonstrate understanding of plan of care, disease process/condition, diagnostic tests and medications  Outcome: Progressing     Problem: Depression  Goal: Patient and family/caregiver will verbalize accurate information about at least two of the possible causes of depression, three-four of the signs and symptoms of depression  Outcome: Progressing     Problem: Safety:  Goal: Will remain free from injury  Outcome: Progressing     Problem: Psychosocial Needs:  Goal: Ability to identify and develop effective coping behavior will improve  Outcome: Progressing  Goal: Ability to verbalize positive feelings about self will improve  Outcome: Progressing     Problem: Health Behavior:  Goal: Ability to identify appropriate support needs will improve  Outcome: Progressing  Goal: Ability to identify and utilize available support systems will improve  Outcome: Progressing     Problem: Fall Risk  Goal: Patient will remain free from falls  Outcome: Progressing     Problem: Provide Safe Environment  Goal: Suicide environmental safety, protocols, policies, and practices will be implemented  Outcome: Progressing   The patient is Stable - Low risk of patient condition declining or worsening    Shift Goals  Clinical Goals: pt safety; vss  Patient Goals: to get some sleep  Family Goals: updates    Progress made toward(s) clinical / shift goals:  Pt remains free from falls at this time. Safety precautions in place. Pt educated on calling for assistance when needed.  Proper hand hygiene before and after patient care to ensure patient will remain free from infection.  Slept well overnight. No attempts at elopement. 1:1 sitter for safety maintained.     Patient is not progressing towards the following goals:

## 2024-10-05 NOTE — PROGRESS NOTES
Hospital Medicine Daily Progress Note    Date of Service  10/4/2024    Chief Complaint  Lee Yadav is a 39 y.o. male admitted 9/26/2024 with altered mental status with concern for seizure.    Hospital Course  Lee Yadav is a 39 y.o. male with history of bipolar disorder who presented 9/26/2024 from psychiatric facility [facility], where he has been hospitalized for his psychiatric illness, with concern for periods of altered mentation in the last 2 days.    Per ERP Dr. Bentley who spoke to MD at psychiatric facility, patient would become nonverbal and nonresponsive, looking with a blank stare, will track but would not respond to questions.  Patient states that he had several syncopal episode, most recently on day of admission when he was feeling dizzy, passed out and fell hitting his right hip.    he was started on Thorazine 2 days prior to admission  Denies any GI illness.  UDS: Cannabinoids, benzodiazepines.  CT head without contrast negative.  Pelvic x-ray: Degenerative changes in the sacroiliac joints.  EKG: Sinus rhythm heart rate 69, no significant T/ST fluctuations, QTc is not prolonged.  Multiple psychiatric medications noted in the patient outpatient medications list: Thorazine, lithium, Latuda, olanzapine, trazodone    Was expressing paranoid delusions, refused EEG initially. Spot extended to 22 hours, patient removed leads at that time. Had another syncopal episode and injured face.   Decreased Zyprexa to 10 mg by daily.  Started melatonin 5 mg by mouth at bedtime as needed for insomnia.  Behavioral health consult placed.  Discussed with Dr. Denis of psychiatry.    Interval Problem Update  10/1/2024  Afebrile pulse 59-62 respiratory rate 16 systolic blood pressure 110-150s pulse ox 96-98% on room air.  EEG did not show any seizure activity or evidence of recent seizures.  Briefly discussed with neurology, recommended obtaining echo to complete syncope workup.  Due to echo results showing  normal RV size with preserved function however hypokinesis of the free wall and hypokinetic apex, recommendation to consider excluding PE and if negative obtain cardiac MRI for better assessment evaluation of the RV.  I ordered a D-dimer which was undetectable, will proceed with cardiac MRI.  Initiating AEDs not recommended by neurology at this time, does recommend one-time follow-up in their seizure clinic.    10/2  Afebrile pulse 60s to 80s normal respiratory rate and blood pressure pulse ox 91 to 97% on room air.  Patient went for his cardiac MRI later in the afternoon, results still pending, if no actionable findings will be medically cleared for discharge back to inpatient psychiatric facility, if any actionable findings will consult cardiology.  No further syncopal episodes, no suspected seizure activity.  Discussed with patient and family at bedside. Discussed with psychiatry today, discontinue olanzapine, schedule ativan, discontinue restoril.     10/3  Cardiac MRI resulted in afternoon and normal, no RV dysfunction. Mecially cleared for discharge back to behavioral health facility for continued care. Adjusted ativasn regimen after discussing with psychiatry. No new complaints, up walking around unit tolerating treatments well.  Seems less paranoid and more involved in our conversation today, parents also noted an improvement.    10/4  Afebrile, HR in the 60's normal RR -138, SpO2 94-98% on room air.  Tolerating PO intake. Notified that patient's transfer may take as long as 4 more days. [place] to [place] garden today which helped his anxiety about not being able to leave. No new complaints to me.    I have discussed this patient's plan of care and discharge plan at IDT rounds today with Case Management, Nursing, Nursing leadership, and other members of the IDT team.    Consultants/Specialty  psychiatry    Code Status  Full Code    Disposition  Medically Cleared  I have placed the appropriate orders  for post-discharge needs.    Review of Systems  Review of Systems   Constitutional:  Negative for chills and fever.   Respiratory:  Negative for cough and shortness of breath.    Cardiovascular:  Negative for chest pain and palpitations.   Gastrointestinal:  Negative for abdominal pain, nausea and vomiting.   Musculoskeletal:  Negative for falls, joint pain and myalgias.   Neurological:  Negative for dizziness and headaches.   Psychiatric/Behavioral:  Negative for hallucinations. The patient is nervous/anxious.         Physical Exam  Temp:  [35.8 °C (96.4 °F)-36.7 °C (98 °F)] (P) 36.1 °C (97 °F)  Pulse:  [62-68] 68  Resp:  [12-19] (P) 14  BP: (117-138)/(50-72) 117/57  SpO2:  [94 %-98 %] (P) 98 %    Physical Exam  Vitals and nursing note reviewed. Exam conducted with a chaperone present.   Constitutional:       General: He is not in acute distress.     Appearance: He is not ill-appearing or toxic-appearing.   HENT:      Head: Normocephalic and atraumatic.      Comments: EEG leads in place     Nose: Nose normal. No congestion.      Mouth/Throat:      Mouth: Mucous membranes are moist.      Pharynx: Oropharynx is clear. No oropharyngeal exudate.      Comments: Healing abrasion to the left upper lip  Eyes:      General: No scleral icterus.        Right eye: No discharge.         Left eye: No discharge.      Conjunctiva/sclera: Conjunctivae normal.   Cardiovascular:      Rate and Rhythm: Normal rate and regular rhythm.      Pulses: Normal pulses.      Heart sounds: Normal heart sounds. No murmur heard.  Pulmonary:      Effort: Pulmonary effort is normal. No respiratory distress.      Breath sounds: Normal breath sounds.   Abdominal:      General: Abdomen is flat. Bowel sounds are normal. There is no distension.      Palpations: Abdomen is soft.   Musculoskeletal:         General: No swelling. Normal range of motion.      Cervical back: Normal range of motion and neck supple. No tenderness.      Right lower leg: No edema.       Left lower leg: No edema.   Skin:     General: Skin is warm and dry.   Neurological:      Mental Status: He is alert and oriented to person, place, and time. Mental status is at baseline.      Motor: No weakness.   Psychiatric:         Attention and Perception: Attention normal.         Mood and Affect: Mood is anxious. Affect is blunt and flat.         Speech: Speech normal.         Behavior: Behavior normal. Behavior is cooperative.         Thought Content: Thought content is paranoid.         Cognition and Memory: Cognition normal.         Judgment: Judgment normal.         Fluids    Intake/Output Summary (Last 24 hours) at 10/5/2024 0508  Last data filed at 10/5/2024 0007  Gross per 24 hour   Intake 240 ml   Output 0 ml   Net 240 ml          Laboratory                            Imaging  MR-CARDIAC MORPH/FUNC WITH & W/O   Final Result      1.  Normal biventricular size and function. No delayed myocardial enhancement.   2.  No MR findings of right ventricular cardiomyopathy.                           EC-ECHOCARDIOGRAM COMPLETE W/O CONT   Final Result      CT-HEAD W/O   Final Result      Normal CT of the head without IV contrast.               DX-PELVIS-1 OR 2 VIEWS   Final Result      1. No acute post traumatic imaging findings.   2. Moderate degenerative changes involving the sacroiliac joints, greater than expected for the age of the patient.      DX-CHEST-PORTABLE (1 VIEW)   Final Result      No acute cardiac or pulmonary abnormalities are identified.           Assessment/Plan  * Seizure (HCC)- (present on admission)  Assessment & Plan  9/29/2024  Seizure versus pseudoseizure.  Prolactin is elevated suggestive of a actual seizure.  CPK was also elevated however can be related to fall.  Spot EEG was negative  I have ordered 24-hour continuous EEG monitoring, patient is agreeable.    9/30/2024  Undergoing 24-hour EEG monitoring.    Abrasion of skin of lip- (present on admission)  Assessment &  Plan  9/29/2024  Secondary to ground-level fall in the hospital.  I ordered bacitracin    Marijuana use- (present on admission)  Assessment & Plan  9/28/2024  Urine drug screen positive for cannabinoids.  May be contributing to anxiety and paranoia.    Insomnia due to other mental disorder- (present on admission)  Assessment & Plan  9/28/2024  Ongoing insomnia.  Continue Benadryl as needed at bedtime  Meditative breathing techniques and restoration of circadian rhythm encouraged.    9/30/2024  Ordering melatonin 5 mg at bedtime as needed    Ground-level fall- (present on admission)  Assessment & Plan  9/28/2024  Occurred today following staring episode.  It is face onto the foot of the bed resulting in laceration of his upper gum and abrasion to the left upper lip.  Resuming continuous cardiac monitoring to monitor for arrhythmias    Psychosis (HCC)- (present on admission)  Assessment & Plan  9/27/2024  Severe paranoid vs auditory hallucinations.  Psychiatry recommendations reviewed and implemented.  Increasing lithium to 900 mg QHS  Changing olanzapine to 10 mg PO BID. Plan to taper.  Hydroxyzine as needed for anxiety.  Ativan as needed for breakthrough anxiety.     9/28/2024  Continue current medications  Receiving Ativan as needed yesterday along with Droxia seen and Benadryl for sleep, which improved his sleep.    9/29/2024  Appears to be improving on current medications.    9/30/2024  Decreasing Zyprexa to 10 mg by daily.  Continue lithium 900 mg at bedtime.  Check lithium level tomorrow.  Starting melatonin 5 mg by mouth at bedtime as needed for insomnia.  Behavioral health consult placed.  Discussed with Dr. Denis of psychiatry.    Anxiety- (present on admission)  Assessment & Plan  9/27/2024  Continue hydroxyzine as needed.  Ativan as needed for breakthrough anxiety.    9/28/2024  Continue hydroxyzine and Ativan    Hypothyroidism  Assessment & Plan  Continue levothyroxine  Recheck TSH    9/28/2024  TSH of  2.08.  Within normal limits.  Continue levothyroxine 112 mcg daily.    Hypertension  Assessment & Plan  Continue losartan  Monitor blood pressure    9/27/2024  Stable.  Continue losartan 25 mg daily    Rhabdomyolysis  Assessment & Plan  Traumatic versus nontraumatic  IV hydration  Repeat CPK  If goes up, consider holding antipsychotics.  Check lithium level    9/27/2024  Improving to 437  Check level tomorrow  Question whether related to seizure.    Suicidal ideations  Assessment & Plan  Patient is vague in regards to questioning for suicidal ideations  He has been on legal hold  Will put suicidal precautions    9/27/2024  Continues with one to one sitter with suicide precautions.    9/29/2024  Continues on one-to-one sitter with suicide precautions.    Syncope  Assessment & Plan  Suspected side effect of antipsychotics, causing ineffective postural vascular tone reflex, orthostatic hypotension, orthostatic syncope  EKG does not show QTc prolongation  Will monitor on telemetry for arrhythmia  And give bolus of saline 1 L  Orthostatic vitals    9/27/2024  Continue telemetry monitoring.    9/28/2024  Patient was discontinued telemetry after which he had a ground-level fall following a staring episode.  I have reinitiated continuous telemetry monitoring.  Does not appear to be cardiogenic in nature.  Suspect possible seizures.  EEG has been ordered.    9/29/2024  No further episodes.  Continue continuous cardiac monitoring  Spot EEG was negative  I have ordered 24-hour continuous EEG monitoring    9/30/2024  No further syncope episodes.  Patient agreeable to continuous EEG monitoring and continuous telemetry monitoring.    Encephalopathy- (present on admission)  Assessment & Plan  Differential diagnosis may include polypharmacy and side effects of his psychiatric medications, given added Thorazine 2 days ago, and catatonic state, non convulsive seizure  CT head without contrast is negative  Prolactin was noted is  elevated which is nonspecific, and could be elevated as a side effect of neuroleptics, as well as after seizures  CPK is elevated that could be result of muscle injury or again side effects of antipsychotics.  However there is no increased muscle tone on exam  Plan: Evaluate patient with EEG.  Check ammonia, TSH, vitamin B12, lithium level  Consider psychiatric consultation for medications management given concern for side effect and polypharmacy    9/27/2024  Appears to be improving.  Alert and oriented x3.  States he from [place] and in [place] due to psychiatric issues.  Not sure if he wants to go back to [facility].  Agreeable to EEG.  Discussed with EEG technician.  Discussed with Dr. Rushing of psychiatry.    9/28/2024  Alert and orient x 3.  Had a ground-level fall after staring episode.  He was just taken off telemetry monitoring.  I discussed with the EEG technician about obtaining urgent EEG.    9/30/2024  Continues to be alert and orient x 3.    9/29/2024  Alert and orient x 3.  Improving.         VTE prophylaxis:    enoxaparin ppx      I have performed a physical exam and reviewed and updated ROS and Plan today (10/4/2024). In review of yesterday's note (10/3/2024), there are no changes except as documented above.  Total time spent 45 minutes. I spent greater than 50% of the time for patient care, counseling, and coordination on this date, including unit/floor time, and face-to-face time with the patient as per interval events, my own review of patient's imaging and lab analysis and developing my assessment and plan above.

## 2024-10-05 NOTE — CARE PLAN
The patient is Stable - Low risk of patient condition declining or worsening    Shift Goals  Clinical Goals: VSS, safety, 1:1 obs  Patient Goals: rest  Family Goals: updates    Progress made toward(s) clinical / shift goals:    Problem: Knowledge Deficit - Standard  Goal: Patient and family/care givers will demonstrate understanding of plan of care, disease process/condition, diagnostic tests and medications  Outcome: Progressing     Problem: Pain - Standard  Goal: Alleviation of pain or a reduction in pain to the patient’s comfort goal  Outcome: Progressing     Problem: Depression  Goal: Patient and family/caregiver will verbalize accurate information about at least two of the possible causes of depression, three-four of the signs and symptoms of depression  Outcome: Progressing     Problem: Safety:  Goal: Will remain free from injury  Outcome: Progressing     Problem: Psychosocial Needs:  Goal: Ability to identify and develop effective coping behavior will improve  Outcome: Progressing  Goal: Ability to verbalize positive feelings about self will improve  Outcome: Progressing     Problem: Health Behavior:  Goal: Ability to identify appropriate support needs will improve  Outcome: Progressing  Goal: Ability to identify and utilize available support systems will improve  Outcome: Progressing     Problem: Fall Risk  Goal: Patient will remain free from falls  Outcome: Progressing     Problem: Provide Safe Environment  Goal: Suicide environmental safety, protocols, policies, and practices will be implemented  Outcome: Progressing     Problem: Psychosocial  Goal: Patient's ability to identify and develop effective coping behaviors will improve  Outcome: Progressing  Goal: Patient's ability to identify and utilize available support systems will improve  Outcome: Progressing     Problem: Safety - Medical Restraint  Goal: Remains free of injury from restraints (Restraint for Interference with Medical Device)  Outcome:  Progressing  Goal: Free from restraint(s) (Restraint for Interference with Medical Device)  Outcome: Progressing       Patient is not progressing towards the following goals:

## 2024-10-06 PROBLEM — T79.6XXA TRAUMATIC RHABDOMYOLYSIS (HCC): Status: ACTIVE | Noted: 2024-09-26

## 2024-10-06 PROCEDURE — A9270 NON-COVERED ITEM OR SERVICE: HCPCS | Mod: UD | Performed by: INTERNAL MEDICINE

## 2024-10-06 PROCEDURE — 700102 HCHG RX REV CODE 250 W/ 637 OVERRIDE(OP): Mod: UD | Performed by: INTERNAL MEDICINE

## 2024-10-06 PROCEDURE — 700102 HCHG RX REV CODE 250 W/ 637 OVERRIDE(OP): Performed by: STUDENT IN AN ORGANIZED HEALTH CARE EDUCATION/TRAINING PROGRAM

## 2024-10-06 PROCEDURE — A9270 NON-COVERED ITEM OR SERVICE: HCPCS | Performed by: STUDENT IN AN ORGANIZED HEALTH CARE EDUCATION/TRAINING PROGRAM

## 2024-10-06 PROCEDURE — 99232 SBSQ HOSP IP/OBS MODERATE 35: CPT | Performed by: STUDENT IN AN ORGANIZED HEALTH CARE EDUCATION/TRAINING PROGRAM

## 2024-10-06 PROCEDURE — G0378 HOSPITAL OBSERVATION PER HR: HCPCS

## 2024-10-06 RX ORDER — ACETAMINOPHEN 500 MG
1000 TABLET ORAL EVERY 6 HOURS PRN
Status: DISCONTINUED | OUTPATIENT
Start: 2024-10-06 | End: 2024-10-08 | Stop reason: HOSPADM

## 2024-10-06 RX ADMIN — GEMFIBROZIL 600 MG: 600 TABLET ORAL at 20:59

## 2024-10-06 RX ADMIN — GEMFIBROZIL 600 MG: 600 TABLET ORAL at 08:58

## 2024-10-06 RX ADMIN — LEVOTHYROXINE SODIUM 112 MCG: 0.11 TABLET ORAL at 05:06

## 2024-10-06 RX ADMIN — LORAZEPAM 2 MG: 1 TABLET ORAL at 20:58

## 2024-10-06 RX ADMIN — LORAZEPAM 1 MG: 1 TABLET ORAL at 08:58

## 2024-10-06 RX ADMIN — LITHIUM CARBONATE 900 MG: 300 TABLET ORAL at 20:59

## 2024-10-06 RX ADMIN — SENNOSIDES AND DOCUSATE SODIUM 2 TABLET: 50; 8.6 TABLET ORAL at 19:30

## 2024-10-06 RX ADMIN — LORAZEPAM 1 MG: 1 TABLET ORAL at 16:02

## 2024-10-06 RX ADMIN — LOSARTAN POTASSIUM 25 MG: 25 TABLET, FILM COATED ORAL at 05:06

## 2024-10-06 RX ADMIN — LURASIDONE HYDROCHLORIDE 120 MG: 40 TABLET, FILM COATED ORAL at 20:59

## 2024-10-06 ASSESSMENT — COGNITIVE AND FUNCTIONAL STATUS - GENERAL
SUGGESTED CMS G CODE MODIFIER MOBILITY: CH
SUGGESTED CMS G CODE MODIFIER DAILY ACTIVITY: CH
DAILY ACTIVITIY SCORE: 24
MOBILITY SCORE: 24

## 2024-10-06 ASSESSMENT — ENCOUNTER SYMPTOMS
BACK PAIN: 0
SHORTNESS OF BREATH: 0
VOMITING: 0
NAUSEA: 0
MYALGIAS: 0
NERVOUS/ANXIOUS: 0
ABDOMINAL PAIN: 0
DIARRHEA: 0
NECK PAIN: 0
CONSTIPATION: 0
DEPRESSION: 0

## 2024-10-06 ASSESSMENT — PAIN DESCRIPTION - PAIN TYPE: TYPE: ACUTE PAIN

## 2024-10-06 NOTE — PROGRESS NOTES
Hospital Medicine Daily Progress Note    Date of Service  10/5/2024    Chief Complaint  Lee Yadav is a 39 y.o. male admitted 9/26/2024 with altered mental status with concern for seizure.    Hospital Course  Lee Yadav is a 39 y.o. male with history of bipolar disorder who presented [facility]/26/[facility]24 [facility] where he has been hospitalized for his psychiatric illness, with concern for periods of altered mentation in the last 2 days.    Per ERP Dr. Bentley who spoke to MD at psychiatric facility, patient would become nonverbal and nonresponsive, looking with a blank stare, will track but would not respond to questions.  Patient states that he had several syncopal episode, most recently on day of admission when he was feeling dizzy, passed out and fell hitting his right hip.    he was started on Thorazine 2 days prior to admission  Denies any GI illness.  UDS: Cannabinoids, benzodiazepines.  CT head without contrast negative.  Pelvic x-ray: Degenerative changes in the sacroiliac joints.  EKG: Sinus rhythm heart rate 69, no significant T/ST fluctuations, QTc is not prolonged.  Multiple psychiatric medications noted in the patient outpatient medications list: Thorazine, lithium, Latuda, olanzapine, trazodone    Was expressing paranoid delusions, refused EEG initially. Spot extended to 22 hours, patient removed leads at that time. Had another syncopal episode and injured face.   Decreased Zyprexa to 10 mg by daily.  Started melatonin 5 mg by mouth at bedtime as needed for insomnia.  Behavioral health consult placed.  Discussed with Dr. Denis of psychiatry.  EEG did not show any seizure activity or evidence of recent seizures.  Briefly discussed with neurology, recommended obtaining echo to complete syncope workup. Initiating AEDs not recommended by neurology at this time, does recommend one-time follow-up in their seizure clinic.  echo results showing normal RV size with preserved  function however hypokinesis of the free wall and hypokinetic apex, recommendation to consider excluding PE and if negative obtain cardiac MRI for better assessment evaluation of the RV.   D-dimer which was undetectable.  cardiac MRI resulted and normal, no RV dysfunction.   Mecially cleared for discharge back to behavioral health facility for continued care. Discharge delayed due to ability for facility to accept patient.    Interval Problem Update  10/5  Medically cleared for discharge back to psych facility when they are able to accept the patient.  Tolerating all therapies, anxiety has been improved a little bit from prior.  Psych still following, follow the recommendations, they have been actively adjusting/recommending changes to medication regimen.    I have discussed this patient's plan of care and discharge plan at IDT rounds today with Case Management, Nursing, Nursing leadership, and other members of the IDT team.    Consultants/Specialty  psychiatry    Code Status  Full Code    Disposition  The patient is medically cleared for discharge to home or a post-acute facility.      I have placed the appropriate orders for post-discharge needs.    Review of Systems  Review of Systems   Constitutional:  Negative for chills and fever.   Respiratory:  Negative for cough and shortness of breath.    Cardiovascular:  Negative for chest pain and palpitations.   Gastrointestinal:  Negative for abdominal pain, nausea and vomiting.   Musculoskeletal:  Negative for falls, joint pain and myalgias.   Neurological:  Negative for dizziness and headaches.   Psychiatric/Behavioral:  Negative for hallucinations. The patient is nervous/anxious.         Physical Exam  Temp:  [35.8 °C (96.4 °F)-36.8 °C (98.2 °F)] 36.6 °C (97.8 °F)  Pulse:  [60-68] 67  Resp:  [12-18] 18  BP: (117-145)/(57-98) 136/85  SpO2:  [95 %-98 %] 96 %    Physical Exam  Vitals and nursing note reviewed. Exam conducted with a chaperone present.   Constitutional:        General: He is not in acute distress.     Appearance: He is not ill-appearing or toxic-appearing.   HENT:      Head: Normocephalic and atraumatic.      Comments: EEG leads in place     Nose: Nose normal. No congestion.      Mouth/Throat:      Mouth: Mucous membranes are moist.      Pharynx: Oropharynx is clear. No oropharyngeal exudate.      Comments: Healing abrasion to the left upper lip  Eyes:      General: No scleral icterus.        Right eye: No discharge.         Left eye: No discharge.      Extraocular Movements: Extraocular movements intact.      Conjunctiva/sclera: Conjunctivae normal.   Cardiovascular:      Rate and Rhythm: Normal rate and regular rhythm.      Pulses: Normal pulses.      Heart sounds: Normal heart sounds. No murmur heard.  Pulmonary:      Effort: Pulmonary effort is normal. No respiratory distress.      Breath sounds: Normal breath sounds.   Abdominal:      General: Abdomen is flat. Bowel sounds are normal. There is no distension.      Palpations: Abdomen is soft.   Musculoskeletal:         General: No swelling. Normal range of motion.      Cervical back: Normal range of motion and neck supple. No tenderness.      Right lower leg: No edema.      Left lower leg: No edema.   Skin:     General: Skin is warm and dry.      Coloration: Skin is not jaundiced.   Neurological:      Mental Status: He is alert and oriented to person, place, and time. Mental status is at baseline.      Motor: No weakness.   Psychiatric:         Attention and Perception: Attention normal.         Mood and Affect: Mood is anxious. Affect is blunt and flat.         Speech: Speech normal.         Behavior: Behavior normal. Behavior is cooperative.         Thought Content: Thought content is paranoid.         Cognition and Memory: Cognition normal.         Judgment: Judgment normal.         Fluids    Intake/Output Summary (Last 24 hours) at 10/5/2024 2304  Last data filed at 10/5/2024 2000  Gross per 24 hour   Intake  440 ml   Output 0 ml   Net 440 ml          Laboratory                            Imaging  MR-CARDIAC MORPH/FUNC WITH & W/O   Final Result      1.  Normal biventricular size and function. No delayed myocardial enhancement.   2.  No MR findings of right ventricular cardiomyopathy.                           EC-ECHOCARDIOGRAM COMPLETE W/O CONT   Final Result      CT-HEAD W/O   Final Result      Normal CT of the head without IV contrast.               DX-PELVIS-1 OR 2 VIEWS   Final Result      1. No acute post traumatic imaging findings.   2. Moderate degenerative changes involving the sacroiliac joints, greater than expected for the age of the patient.      DX-CHEST-PORTABLE (1 VIEW)   Final Result      No acute cardiac or pulmonary abnormalities are identified.           Assessment/Plan  * Seizure (HCC)- (present on admission)  Assessment & Plan  9/29/2024  Seizure versus pseudoseizure.  Prolactin is elevated suggestive of a actual seizure.  CPK was also elevated however can be related to fall.  Spot EEG was negative  I have ordered 24-hour continuous EEG monitoring, patient is agreeable.    9/30/2024  Undergoing 24-hour EEG monitoring.    Abrasion of skin of lip- (present on admission)  Assessment & Plan  9/29/2024  Secondary to ground-level fall in the hospital.  I ordered bacitracin    Marijuana use- (present on admission)  Assessment & Plan  9/28/2024  Urine drug screen positive for cannabinoids.  May be contributing to anxiety and paranoia.    Insomnia due to other mental disorder- (present on admission)  Assessment & Plan  9/28/2024  Ongoing insomnia.  Continue Benadryl as needed at bedtime  Meditative breathing techniques and restoration of circadian rhythm encouraged.    9/30/2024  Ordering melatonin 5 mg at bedtime as needed    Ground-level fall- (present on admission)  Assessment & Plan  9/28/2024  Occurred today following staring episode.  It is face onto the foot of the bed resulting in laceration of his  upper gum and abrasion to the left upper lip.  Resuming continuous cardiac monitoring to monitor for arrhythmias    Psychosis (HCC)- (present on admission)  Assessment & Plan  9/27/2024  Severe paranoid vs auditory hallucinations.  Psychiatry recommendations reviewed and implemented.  Increasing lithium to 900 mg QHS  Changing olanzapine to 10 mg PO BID. Plan to taper.  Hydroxyzine as needed for anxiety.  Ativan as needed for breakthrough anxiety.     9/28/2024  Continue current medications  Receiving Ativan as needed yesterday along with Droxia seen and Benadryl for sleep, which improved his sleep.    9/29/2024  Appears to be improving on current medications.    9/30/2024  Decreasing Zyprexa to 10 mg by daily.  Continue lithium 900 mg at bedtime.  Check lithium level tomorrow.  Starting melatonin 5 mg by mouth at bedtime as needed for insomnia.  Behavioral health consult placed.  Discussed with Dr. Denis of psychiatry.    Anxiety- (present on admission)  Assessment & Plan  9/27/2024  Continue hydroxyzine as needed.  Ativan as needed for breakthrough anxiety.    9/28/2024  Continue hydroxyzine and Ativan    Hypothyroidism  Assessment & Plan  Continue levothyroxine  Recheck TSH    9/28/2024  TSH of 2.08.  Within normal limits.  Continue levothyroxine 112 mcg daily.    Hypertension  Assessment & Plan  Continue losartan  Monitor blood pressure    9/27/2024  Stable.  Continue losartan 25 mg daily    Rhabdomyolysis  Assessment & Plan  Traumatic versus nontraumatic  IV hydration  Repeat CPK  If goes up, consider holding antipsychotics.  Check lithium level    9/27/2024  Improving to 437  Check level tomorrow  Question whether related to seizure.    Suicidal ideations  Assessment & Plan  Patient is vague in regards to questioning for suicidal ideations  He has been on legal hold  Will put suicidal precautions    9/27/2024  Continues with one to one sitter with suicide precautions.    9/29/2024  Continues on one-to-one sitter  with suicide precautions.    Syncope  Assessment & Plan  Suspected side effect of antipsychotics, causing ineffective postural vascular tone reflex, orthostatic hypotension, orthostatic syncope  EKG does not show QTc prolongation  Will monitor on telemetry for arrhythmia  And give bolus of saline 1 L  Orthostatic vitals    9/27/2024  Continue telemetry monitoring.    9/28/2024  Patient was discontinued telemetry after which he had a ground-level fall following a staring episode.  I have reinitiated continuous telemetry monitoring.  Does not appear to be cardiogenic in nature.  Suspect possible seizures.  EEG has been ordered.    9/29/2024  No further episodes.  Continue continuous cardiac monitoring  Spot EEG was negative  I have ordered 24-hour continuous EEG monitoring    9/30/2024  No further syncope episodes.  Patient agreeable to continuous EEG monitoring and continuous telemetry monitoring.    Encephalopathy- (present on admission)  Assessment & Plan  Differential diagnosis may include polypharmacy and side effects of his psychiatric medications, given added Thorazine 2 days ago, and catatonic state, non convulsive seizure  CT head without contrast is negative  Prolactin was noted is elevated which is nonspecific, and could be elevated as a side effect of neuroleptics, as well as after seizures  CPK is elevated that could be result of muscle injury or again side effects of antipsychotics.  However there is no increased muscle tone on exam  Plan: Evaluate patient with EEG.  Check ammonia, TSH, vitamin B12, lithium level  Consider psychiatric consultation for medications management given concern for side effect and polypharmacy    9/27/2024  Appears to be improving.  Alert and oriented x3.  States he from [place] and in [place] due to psychiatric issues.  Not sure if he wants to go back to [facility].  Agreeable to EEG.  Discussed with EEG technician.  Discussed with Dr. Rushing of psychiatry.    9/28/2024  Alert and  orient x 3.  Had a ground-level fall after staring episode.  He was just taken off telemetry monitoring.  I discussed with the EEG technician about obtaining urgent EEG.    9/30/2024  Continues to be alert and orient x 3.    9/29/2024  Alert and orient x 3.  Improving.         VTE prophylaxis:   SCDs/TEDs   enoxaparin ppx      I have performed a physical exam and reviewed and updated ROS and Plan today (10/5/2024). In review of yesterday's note (10/4/2024), there are no changes except as documented above.  Total time spent 44 minutes. I spent greater than 50% of the time for patient care, counseling, and coordination on this date, including unit/floor time, and face-to-face time with the patient as per interval events, my own review of patient's imaging and lab analysis and developing my assessment and plan above.

## 2024-10-06 NOTE — CARE PLAN
The patient is Stable - Low risk of patient condition declining or worsening    Shift Goals  Clinical Goals: Pt will remain safe throughout shift with all needs met  Patient Goals: rest; go back to [facility]  Family Goals: RITIKA    Progress made toward(s) clinical / shift goals:  Pt remained safe with all needs met throughout shift due to medication administration, 1:1 safety sitter, communication, hourly rounding, and family at bedside. Pt is medically clear pending placement back to [facility].    Patient is not progressing towards the following goals: NA

## 2024-10-06 NOTE — PROGRESS NOTES
Hospital Medicine Daily Progress Note    Date of Service  10/5/2024    Chief Complaint  Lee Yadav is a 39 y.o. male admitted 9/26/2024 with altered mental status with concern for seizure.    Hospital Course  Lee Yadav is a 39 y.o. male with history of bipolar disorder who presented 9/26/2024 from psychiatric facility [facility], where he has been hospitalized for his psychiatric illness, with concern for periods of altered mentation in the last 2 days.    Per ERP Dr. Bentley who spoke to MD at psychiatric facility, patient would become nonverbal and nonresponsive, looking with a blank stare, will track but would not respond to questions.  Patient states that he had several syncopal episode, most recently on day of admission when he was feeling dizzy, passed out and fell hitting his right hip.    he was started on Thorazine 2 days prior to admission  Denies any GI illness.  UDS: Cannabinoids, benzodiazepines.  CT head without contrast negative.  Pelvic x-ray: Degenerative changes in the sacroiliac joints.  EKG: Sinus rhythm heart rate 69, no significant T/ST fluctuations, QTc is not prolonged.  Multiple psychiatric medications noted in the patient outpatient medications list: Thorazine, lithium, Latuda, olanzapine, trazodone    Was expressing paranoid delusions, refused EEG initially. Spot extended to 22 hours, patient removed leads at that time. Had another syncopal episode and injured face.   Decreased Zyprexa to 10 mg by daily.  Started melatonin 5 mg by mouth at bedtime as needed for insomnia.  Behavioral health consult placed.  Discussed with Dr. Denis of psychiatry.  EEG did not show any seizure activity or evidence of recent seizures.  Briefly discussed with neurology, recommended obtaining echo to complete syncope workup. Initiating AEDs not recommended by neurology at this time, does recommend one-time follow-up in their seizure clinic.  echo results showing normal RV size with preserved  "function however hypokinesis of the free wall and hypokinetic apex, recommendation to consider excluding PE and if negative obtain cardiac MRI for better assessment evaluation of the RV.   D-dimer which was undetectable.  cardiac MRI resulted and normal, no RV dysfunction.   Mecially cleared for discharge back to behavioral health facility for continued care. Discharge delayed due to ability for facility to accept patient.    Interval Problem Update    CAMILO ON  He feels good \"considering the circumstances.\"  He denies pain, N/V, dyspnea, bowel/bladder dysfunction.  He requests to walk around. Deferred to staff for supervision.    No new labs nor imaging.  Awaiting [facility] availability    I have discussed this patient's plan of care and discharge plan at IDT rounds today with Case Management, Nursing, Nursing leadership, and other members of the IDT team.    Consultants/Specialty  psychiatry    Code Status  Full Code    Disposition  The patient is medically cleared for discharge to home or a post-acute facility.  Anticipate discharge to: a psychiatric hospital    I have placed the appropriate orders for post-discharge needs.    Review of Systems  Review of Systems   Respiratory:  Negative for shortness of breath.    Gastrointestinal:  Negative for abdominal pain, constipation, diarrhea, nausea and vomiting.   Genitourinary:  Negative for dysuria, frequency and urgency.   Musculoskeletal:  Negative for back pain, joint pain, myalgias and neck pain.   Psychiatric/Behavioral:  Negative for depression. The patient is not nervous/anxious.         Physical Exam  Temp:  [36.3 °C (97.4 °F)-36.7 °C (98 °F)] 36.4 °C (97.5 °F)  Pulse:  [56-67] 56  Resp:  [14-18] 18  BP: (118-136)/(53-85) 118/53  SpO2:  [95 %-100 %] 100 %    Physical Exam  Vitals and nursing note reviewed. Exam conducted with a chaperone present (Sitter at bedside).   Constitutional:       General: He is not in acute distress.     Appearance: Normal appearance. He " is not ill-appearing.   HENT:      Head: Normocephalic.      Nose: Nose normal.      Mouth/Throat:      Mouth: Mucous membranes are moist.      Comments: Healing abrasion to the left upper lip  Eyes:      General: No scleral icterus.     Conjunctiva/sclera: Conjunctivae normal.   Cardiovascular:      Rate and Rhythm: Normal rate and regular rhythm.      Pulses: Normal pulses.      Heart sounds: Normal heart sounds. No murmur heard.     No friction rub. No gallop.   Pulmonary:      Effort: Pulmonary effort is normal. No respiratory distress.      Breath sounds: Normal breath sounds. No wheezing, rhonchi or rales.   Abdominal:      General: Abdomen is flat. Bowel sounds are normal. There is no distension.      Palpations: Abdomen is soft.      Tenderness: There is no abdominal tenderness. There is no guarding or rebound.   Genitourinary:     Comments: No amezcua  Musculoskeletal:      Cervical back: Neck supple.      Right lower leg: No edema.      Left lower leg: No edema.   Skin:     General: Skin is warm and dry.   Neurological:      Mental Status: He is alert.      Comments: Appropriately conversant, moves all extremities   Psychiatric:         Attention and Perception: Attention normal.         Mood and Affect: Mood and affect normal.         Speech: Speech normal.         Behavior: Behavior normal. Behavior is cooperative.         Thought Content: Thought content normal.         Cognition and Memory: Cognition normal.         Judgment: Judgment normal.         Fluids    Intake/Output Summary (Last 24 hours) at 10/6/2024 1128  Last data filed at 10/6/2024 1011  Gross per 24 hour   Intake 560 ml   Output 0 ml   Net 560 ml          Laboratory                            Imaging  MR-CARDIAC MORPH/FUNC WITH & W/O   Final Result      1.  Normal biventricular size and function. No delayed myocardial enhancement.   2.  No MR findings of right ventricular cardiomyopathy.                           EC-ECHOCARDIOGRAM COMPLETE  W/O CONT   Final Result      CT-HEAD W/O   Final Result      Normal CT of the head without IV contrast.               DX-PELVIS-1 OR 2 VIEWS   Final Result      1. No acute post traumatic imaging findings.   2. Moderate degenerative changes involving the sacroiliac joints, greater than expected for the age of the patient.      DX-CHEST-PORTABLE (1 VIEW)   Final Result      No acute cardiac or pulmonary abnormalities are identified.           Assessment/Plan  Abrasion of skin of lip- (present on admission)  Assessment & Plan  Secondary to ground-level fall in the hospital.  S/p bacitracin    Marijuana use- (present on admission)  Assessment & Plan  Urine drug screen positive for cannabinoids.  May be contributing to anxiety and paranoia.    Insomnia due to other mental disorder- (present on admission)  Assessment & Plan  9/28/2024  Ongoing insomnia.  Continue Benadryl as needed at bedtime  Meditative breathing techniques and restoration of circadian rhythm encouraged.    9/30/2024  Ordering melatonin 5 mg at bedtime as needed    Ground-level fall- (present on admission)  Assessment & Plan  In setting of seeming catatonic episode    Psychosis (HCC)- (present on admission)  Assessment & Plan  Improved  Developed severe paranoid vs auditory hallucinations.  Psychiatry consulted, placed on hold and adjusting pharmacotherapy  Continue lurasidone, lithium, scheduled lorazepam    Anxiety- (present on admission)  Assessment & Plan  Psychiatry consulted, scheduled lorazepam, lurasidone, PRN hydroxyzine    Acquired hypothyroidism- (present on admission)  Assessment & Plan  Continue levothyroxine    Primary hypertension- (present on admission)  Assessment & Plan  Continue losartan    Traumatic rhabdomyolysis (HCC)- (present on admission)  Assessment & Plan  Minimal in setting of GLF  <1000 no longer of clinical significance  IVF discontinued    Suicidal ideations  Assessment & Plan  Patient is vague in regards to questioning  for suicidal ideations  Psychiatry consulted, on legal hold  Suicide precautions      Syncope  Assessment & Plan  Suspected side effect of antipsychotics, causing ineffective postural vascular tone reflex, orthostatic hypotension, orthostatic syncope  EKG does not show QTc prolongation  TTE negative for valvular abnormalities  Cardiac MRI normal    Acute encephalopathy- (present on admission)  Assessment & Plan  Improved  Differential diagnosis may include polypharmacy and side effects of his psychiatric medications, given added Thorazine 2 days ago, and catatonic state, non convulsive seizure  CT head without contrast is negative  Prolactin was noted is elevated which is nonspecific, and could be elevated as a side effect of neuroleptics, as well as after seizures  CPK is elevated that could be result of muscle injury or again side effects of antipsychotics.  However there is no increased muscle tone on exam  TSH, TrAbs, B12, EEG normal       VTE prophylaxis:    Xarelto 10mg daily as prophylaxis    I have performed a physical exam and reviewed and updated ROS and Plan today (10/5/2024). In review of yesterday's note (10/4/2024), there are no changes except as documented above.

## 2024-10-06 NOTE — DISCHARGE PLANNING
Alert Team/Behavioral Health   [facility]:        [facility]. [facility] ambulance transport to be arranged on Tue 10/8 when [facility] gives transport time.

## 2024-10-06 NOTE — PROGRESS NOTES
Report received from RN, assumed care at 2245  Safety sitter at bedside   Pt is A0X4, and responds appropriately   Pt declines any SOB, chest pain, new onset of numbness/ tingling  Pt declines any pain at this time  Pt is voiding adequately and without hesitancy  Pt has + flatus, + bowel sounds, - BM, last on 10/3  Pt ambulates independently  Pt is tolerating a regular diet, pt denies any nausea/vomiting  Plan of care discussed, all questions answered. Explained importance of calling before getting OOB and pt verbalizes understanding. Explained importance of oral care. Call light is within reach, treaded slipper socks on, bed in lowest/ locked position, hourly rounding in place, all needs met at this time

## 2024-10-06 NOTE — PROGRESS NOTES
4 Eyes Skin Assessment Completed by TORRI Peralta and TORRI Lzao.    Head WDL  Ears WDL  Nose Redness  Mouth scab to L upper lip  Neck WDL  Breast/Chest WDL  Shoulder Blades WDL  Spine WDL  (R) Arm/Elbow/Hand WDL  (L) Arm/Elbow/Hand WDL  Abdomen WDL  Groin WDL  Scrotum/Coccyx/Buttocks WDL  (R) Leg Scab to knee  (L) Leg Scab to knee  (R) Heel/Foot/Toe Dry  (L) Heel/Foot/Toe Dry          Devices In Places PIV      Interventions In Place Pillows    Possible Skin Injury No    Pictures Uploaded Into Epic N/A  Wound Consult Placed N/A  RN Wound Prevention Protocol Ordered No

## 2024-10-06 NOTE — CARE PLAN
Problem: Knowledge Deficit - Standard  Goal: Patient and family/care givers will demonstrate understanding of plan of care, disease process/condition, diagnostic tests and medications  Outcome: Progressing     Problem: Pain - Standard  Goal: Alleviation of pain or a reduction in pain to the patient’s comfort goal  Outcome: Progressing     Problem: Safety:  Goal: Will remain free from injury  Outcome: Progressing       The patient is Stable - Low risk of patient condition declining or worsening    Shift Goals  Clinical Goals: safety, comfort, q4 hr neuro checks  Patient Goals: comfort, rest  Family Goals: RITIKA    Progress made toward(s) clinical / shift goals:  Pt has safety sitter in place for 1:1 observation for safety. Pt reported comfort and rested this shift. Pt reported anxiety managed per MAR. Every 4 hr neuro checks in place.

## 2024-10-07 PROCEDURE — 700102 HCHG RX REV CODE 250 W/ 637 OVERRIDE(OP): Mod: UD | Performed by: INTERNAL MEDICINE

## 2024-10-07 PROCEDURE — A9270 NON-COVERED ITEM OR SERVICE: HCPCS | Performed by: STUDENT IN AN ORGANIZED HEALTH CARE EDUCATION/TRAINING PROGRAM

## 2024-10-07 PROCEDURE — 700102 HCHG RX REV CODE 250 W/ 637 OVERRIDE(OP): Performed by: STUDENT IN AN ORGANIZED HEALTH CARE EDUCATION/TRAINING PROGRAM

## 2024-10-07 PROCEDURE — A9270 NON-COVERED ITEM OR SERVICE: HCPCS | Mod: UD | Performed by: INTERNAL MEDICINE

## 2024-10-07 PROCEDURE — G0378 HOSPITAL OBSERVATION PER HR: HCPCS

## 2024-10-07 PROCEDURE — 99232 SBSQ HOSP IP/OBS MODERATE 35: CPT | Performed by: STUDENT IN AN ORGANIZED HEALTH CARE EDUCATION/TRAINING PROGRAM

## 2024-10-07 RX ORDER — LORAZEPAM 2 MG/1
2 TABLET ORAL
Status: SHIPPED
Start: 2024-10-07 | End: 2024-10-14

## 2024-10-07 RX ORDER — ACETAMINOPHEN 500 MG
1000 TABLET ORAL EVERY 6 HOURS PRN
Status: SHIPPED
Start: 2024-10-07

## 2024-10-07 RX ORDER — ONDANSETRON 4 MG/1
4 TABLET, ORALLY DISINTEGRATING ORAL EVERY 4 HOURS PRN
Status: SHIPPED
Start: 2024-10-07 | End: 2024-11-25

## 2024-10-07 RX ORDER — HYDROXYZINE HYDROCHLORIDE 50 MG/1
50 TABLET, FILM COATED ORAL EVERY 6 HOURS PRN
Status: SHIPPED
Start: 2024-10-07 | End: 2024-11-25

## 2024-10-07 RX ORDER — LORAZEPAM 1 MG/1
1 TABLET ORAL 2 TIMES DAILY
Status: SHIPPED
Start: 2024-10-08 | End: 2024-10-15

## 2024-10-07 RX ADMIN — LEVOTHYROXINE SODIUM 112 MCG: 0.11 TABLET ORAL at 05:37

## 2024-10-07 RX ADMIN — GEMFIBROZIL 600 MG: 600 TABLET ORAL at 08:48

## 2024-10-07 RX ADMIN — LURASIDONE HYDROCHLORIDE 120 MG: 40 TABLET, FILM COATED ORAL at 20:18

## 2024-10-07 RX ADMIN — LORAZEPAM 1 MG: 1 TABLET ORAL at 08:48

## 2024-10-07 RX ADMIN — LOSARTAN POTASSIUM 25 MG: 25 TABLET, FILM COATED ORAL at 05:37

## 2024-10-07 RX ADMIN — Medication 5 MG: at 20:17

## 2024-10-07 RX ADMIN — GEMFIBROZIL 600 MG: 600 TABLET ORAL at 20:18

## 2024-10-07 RX ADMIN — LORAZEPAM 1 MG: 1 TABLET ORAL at 16:10

## 2024-10-07 RX ADMIN — LITHIUM CARBONATE 900 MG: 300 TABLET ORAL at 20:18

## 2024-10-07 RX ADMIN — LORAZEPAM 2 MG: 1 TABLET ORAL at 20:17

## 2024-10-07 RX ADMIN — SENNOSIDES AND DOCUSATE SODIUM 2 TABLET: 50; 8.6 TABLET ORAL at 17:25

## 2024-10-07 ASSESSMENT — ENCOUNTER SYMPTOMS
NECK PAIN: 0
NAUSEA: 0
ABDOMINAL PAIN: 0
DIARRHEA: 0
BACK PAIN: 0
CONSTIPATION: 0
NERVOUS/ANXIOUS: 0
SHORTNESS OF BREATH: 0
VOMITING: 0
DEPRESSION: 0
MYALGIAS: 0

## 2024-10-07 ASSESSMENT — PAIN DESCRIPTION - PAIN TYPE
TYPE: ACUTE PAIN
TYPE: ACUTE PAIN

## 2024-10-07 NOTE — DISCHARGE PLANNING
Legal Hold Transfer     Referral: Legal hold transfer to Mental Health Facility     Intervention: Notified by  Sandy that pt has been accepted back to [facility].     Pt's accepting physcian is Dr. Celeste     Transport arranged through [place] at [facility]     The pt will be picked up 10/8 at 1000      Notified Charge TORRI Fragoso, TORRI Doyle, and Dr. Merritt of the departure time as well as accepting facility.      Transfer packet to be created with original legal hold and placed on chart tomorrow morning before transfer.     Plan: Pt will be transferring to [facility] 10/8 at 1000 via [facility].

## 2024-10-07 NOTE — DISCHARGE PLANNING
RNCM discussed pt during IDT rounds. CM discussed discharge plan with Dr. Merritt. RNCM received confirmation that pt may discharge to [facility] tomorrow 10/8 at 1000.

## 2024-10-07 NOTE — CARE PLAN
The patient is Stable - Low risk of patient condition declining or worsening    Shift Goals  Clinical Goals: Patient will ambulate in hallway by end of shift  Patient Goals: Rest  Family Goals: pippa    Progress made toward(s) clinical / shift goals:  Patient ambulated in hallway multiple times during day      Problem: Safety:  Goal: Will remain free from injury  Outcome: Progressing     Problem: Provide Safe Environment  Goal: Suicide environmental safety, protocols, policies, and practices will be implemented  Description: Target End Date:  resolve day 1    1.  Remove objects or personal belongings that may cause harm or injury to self or others  2.  Dietary tray modifications (paperware)  3.  Provide a safe environment  4.  Render close patient supervision by sustaining observation or awareness of the patient at all times  Outcome: Progressing       Patient is not progressing towards the following goals:

## 2024-10-07 NOTE — PROGRESS NOTES
Hospital Medicine Daily Progress Note    Date of Service  10/5/2024    Chief Complaint  Lee Yadav is a 39 y.o. male admitted 9/26/2024 with altered mental status with concern for seizure.    Hospital Course  Lee Yadav is a 39 y.o. male with history of bipolar disorder who presented 9/26/2024 from psychiatric facility [facility], where he has been hospitalized for his psychiatric illness, with concern for periods of altered mentation in the last 2 days.    Per ERP Dr. Bentley who spoke to MD at psychiatric facility, patient would become nonverbal and nonresponsive, looking with a blank stare, will track but would not respond to questions.  Patient states that he had several syncopal episode, most recently on day of admission when he was feeling dizzy, passed out and fell hitting his right hip.    he was started on Thorazine 2 days prior to admission  Denies any GI illness.  UDS: Cannabinoids, benzodiazepines.  CT head without contrast negative.  Pelvic x-ray: Degenerative changes in the sacroiliac joints.  EKG: Sinus rhythm heart rate 69, no significant T/ST fluctuations, QTc is not prolonged.  Multiple psychiatric medications noted in the patient outpatient medications list: Thorazine, lithium, Latuda, olanzapine, trazodone    Was expressing paranoid delusions, refused EEG initially. Spot extended to 22 hours, patient removed leads at that time. Had another syncopal episode and injured face.   Decreased Zyprexa to 10 mg by daily.  Started melatonin 5 mg by mouth at bedtime as needed for insomnia.  Behavioral health consult placed.  Discussed with Dr. Denis of psychiatry.  EEG did not show any seizure activity or evidence of recent seizures.  Briefly discussed with neurology, recommended obtaining echo to complete syncope workup. Initiating AEDs not recommended by neurology at this time, does recommend one-time follow-up in their seizure clinic.  echo results showing normal RV size with preserved  function however hypokinesis of the free wall and hypokinetic apex, recommendation to consider excluding PE and if negative obtain cardiac MRI for better assessment evaluation of the RV.   D-dimer which was undetectable.  cardiac MRI resulted and normal, no RV dysfunction.   Mecially cleared for discharge back to behavioral health facility for continued care. Discharge delayed due to ability for facility to accept patient.    Interval Problem Update    CAMILO ON  He knows tomorrow is transfer to [facility].  Denies pain, dyspnea, N/V, bowel/bladder dysfunciton.  Has not had a BM in 4 days. Does not feel constipated. Encouraged senna and PRN miralax.    No new labs nor imaging.  Awaiting [facility] bed 10/8.    POC discussed with Psychiatrist Dr. Queen.   He is calm and cooperative.  No change in pharmacotherapy.    I have discussed this patient's plan of care and discharge plan at IDT rounds today with Case Management, Nursing, Nursing leadership, and other members of the IDT team.    Consultants/Specialty  psychiatry    Code Status  Full Code    Disposition  The patient is medically cleared for discharge to home or a post-acute facility.  Anticipate discharge to: a psychiatric hospital    I have placed the appropriate orders for post-discharge needs.    Review of Systems  Review of Systems   Respiratory:  Negative for shortness of breath.    Gastrointestinal:  Negative for abdominal pain, constipation, diarrhea, nausea and vomiting.   Genitourinary:  Negative for dysuria, frequency and urgency.   Musculoskeletal:  Negative for back pain, joint pain, myalgias and neck pain.   Psychiatric/Behavioral:  Negative for depression. The patient is not nervous/anxious.         Physical Exam  Temp:  [36.6 °C (97.9 °F)-37.1 °C (98.8 °F)] 36.6 °C (97.9 °F)  Pulse:  [61-65] 63  Resp:  [18] 18  BP: (100-121)/(45-65) 109/48  SpO2:  [93 %-95 %] 93 %    Physical Exam  Vitals and nursing note reviewed. Exam conducted with a chaperone present  (Sitter at bedside).   Constitutional:       General: He is not in acute distress.     Appearance: Normal appearance. He is not ill-appearing.   HENT:      Head: Normocephalic.      Nose: Nose normal.      Mouth/Throat:      Mouth: Mucous membranes are moist.   Eyes:      General: No scleral icterus.     Conjunctiva/sclera: Conjunctivae normal.   Pulmonary:      Effort: Pulmonary effort is normal.   Abdominal:      General: Abdomen is flat.   Genitourinary:     Comments: No amezcua  Musculoskeletal:      Cervical back: Neck supple.   Neurological:      Mental Status: He is alert.      Comments: Appropriately conversant, moves all extremities   Psychiatric:         Attention and Perception: Attention normal.         Mood and Affect: Mood and affect normal.         Speech: Speech normal.         Behavior: Behavior normal. Behavior is cooperative.         Thought Content: Thought content normal.         Cognition and Memory: Cognition normal.         Judgment: Judgment normal.         Fluids    Intake/Output Summary (Last 24 hours) at 10/7/2024 0935  Last data filed at 10/7/2024 0908  Gross per 24 hour   Intake 890 ml   Output --   Net 890 ml          Laboratory                            Imaging  MR-CARDIAC MORPH/FUNC WITH & W/O   Final Result      1.  Normal biventricular size and function. No delayed myocardial enhancement.   2.  No MR findings of right ventricular cardiomyopathy.                           EC-ECHOCARDIOGRAM COMPLETE W/O CONT   Final Result      CT-HEAD W/O   Final Result      Normal CT of the head without IV contrast.               DX-PELVIS-1 OR 2 VIEWS   Final Result      1. No acute post traumatic imaging findings.   2. Moderate degenerative changes involving the sacroiliac joints, greater than expected for the age of the patient.      DX-CHEST-PORTABLE (1 VIEW)   Final Result      No acute cardiac or pulmonary abnormalities are identified.           Assessment/Plan  Abrasion of skin of lip-  (present on admission)  Assessment & Plan  Secondary to ground-level fall in the hospital.  S/p bacitracin    Marijuana use- (present on admission)  Assessment & Plan  Urine drug screen positive for cannabinoids.  May be contributing to anxiety and paranoia.    Insomnia due to other mental disorder- (present on admission)  Assessment & Plan  Melatonin PRN]    Ground-level fall- (present on admission)  Assessment & Plan  In setting of seeming catatonic episode    Psychosis (HCC)- (present on admission)  Assessment & Plan  Improved  Developed severe paranoid vs auditory hallucinations.  Psychiatry consulted, placed on hold and adjusting pharmacotherapy  Continue lurasidone, lithium, scheduled lorazepam    Anxiety- (present on admission)  Assessment & Plan  Psychiatry consulted, scheduled lorazepam, lurasidone, PRN hydroxyzine    Acquired hypothyroidism- (present on admission)  Assessment & Plan  Continue levothyroxine    Primary hypertension- (present on admission)  Assessment & Plan  Continue losartan    Traumatic rhabdomyolysis (HCC)- (present on admission)  Assessment & Plan  Minimal in setting of GLF  <1000 no longer of clinical significance  IVF discontinued    Suicidal ideations  Assessment & Plan  Patient is vague in regards to questioning for suicidal ideations  Psychiatry consulted, on legal hold  Suicide precautions      Syncope  Assessment & Plan  Suspected side effect of antipsychotics, causing ineffective postural vascular tone reflex, orthostatic hypotension, orthostatic syncope  EKG does not show QTc prolongation  TTE negative for valvular abnormalities  Cardiac MRI normal    Acute encephalopathy- (present on admission)  Assessment & Plan  Improved  Differential diagnosis may include polypharmacy and side effects of his psychiatric medications  CT head without contrast is negative  Prolactin was noted is elevated which is nonspecific, and could be elevated as a side effect of neuroleptics, as well as  after seizures  CPK is elevated that could be result of muscle injury or again side effects of antipsychotics.  However there is no increased muscle tone on exam  TSH, TrAbs, B12, EEG normal       VTE prophylaxis:    Xarelto 10mg daily as prophylaxis    I have performed a physical exam and reviewed and updated ROS and Plan today (10/5/2024). In review of yesterday's note (10/4/2024), there are no changes except as documented above.

## 2024-10-07 NOTE — CARE PLAN
The patient is Stable - Low risk of patient condition declining or worsening    Shift Goals  Clinical Goals: Free from falls/injury, 1:1 sitter  Patient Goals: Rest  Family Goals: pippa    Progress made toward(s) clinical / shift goals:  Patient denies any pain or discomfort throughout shift. All needs attended at this time. Patient on 1:1 sitter for safety, bed locked to lowest position, and call light in reach.     Problem: Pain - Standard  Goal: Alleviation of pain or a reduction in pain to the patient’s comfort goal  Outcome: Progressing     Problem: Safety:  Goal: Will remain free from injury  Outcome: Progressing     Problem: Fall Risk  Goal: Patient will remain free from falls  Outcome: Progressing       Patient is not progressing towards the following goals:

## 2024-10-07 NOTE — DISCHARGE PLANNING
Alert Team Note     Confirmed with Tasia Garsia at [facility] that pt is still here. Pt has been accepted to [facility] with transfer date of 10/8. A transport time has not yet been provided.

## 2024-10-07 NOTE — DISCHARGE PLANNING
Alert Team Note    Received message from  Shiela at [facility] requesting transfer for 10/8 at 1000.     Notified bedside RN Fouzia, RN VALARIE Doyle, Dr. Merritt, and Alert Team ARTUR Valenzuela.

## 2024-10-08 VITALS
OXYGEN SATURATION: 97 % | SYSTOLIC BLOOD PRESSURE: 101 MMHG | WEIGHT: 218.26 LBS | DIASTOLIC BLOOD PRESSURE: 65 MMHG | RESPIRATION RATE: 17 BRPM | HEIGHT: 76 IN | HEART RATE: 79 BPM | TEMPERATURE: 98.1 F | BODY MASS INDEX: 26.58 KG/M2

## 2024-10-08 PROCEDURE — G0378 HOSPITAL OBSERVATION PER HR: HCPCS

## 2024-10-08 PROCEDURE — 99239 HOSP IP/OBS DSCHRG MGMT >30: CPT

## 2024-10-08 PROCEDURE — A9270 NON-COVERED ITEM OR SERVICE: HCPCS | Mod: UD | Performed by: STUDENT IN AN ORGANIZED HEALTH CARE EDUCATION/TRAINING PROGRAM

## 2024-10-08 PROCEDURE — 700102 HCHG RX REV CODE 250 W/ 637 OVERRIDE(OP): Mod: UD | Performed by: INTERNAL MEDICINE

## 2024-10-08 PROCEDURE — 700102 HCHG RX REV CODE 250 W/ 637 OVERRIDE(OP): Mod: UD | Performed by: STUDENT IN AN ORGANIZED HEALTH CARE EDUCATION/TRAINING PROGRAM

## 2024-10-08 PROCEDURE — A9270 NON-COVERED ITEM OR SERVICE: HCPCS | Mod: UD | Performed by: INTERNAL MEDICINE

## 2024-10-08 RX ADMIN — LORAZEPAM 1 MG: 1 TABLET ORAL at 08:33

## 2024-10-08 RX ADMIN — GEMFIBROZIL 600 MG: 600 TABLET ORAL at 08:33

## 2024-10-08 RX ADMIN — LEVOTHYROXINE SODIUM 112 MCG: 0.11 TABLET ORAL at 05:15

## 2024-10-08 RX ADMIN — LOSARTAN POTASSIUM 25 MG: 25 TABLET, FILM COATED ORAL at 05:15

## 2024-10-08 NOTE — CARE PLAN
The patient is Stable - Low risk of patient condition declining or worsening    Shift Goals  Clinical Goals: Patient be safe this shift  Patient Goals: relax  Family Goals: pippa    Progress made toward(s) clinical / shift goals:    Patient is not progressing towards the following goals:      Problem: Safety:  Goal: Will remain free from injury  Outcome: Not Progressing

## 2024-10-08 NOTE — CARE PLAN
The patient is Stable - Low risk of patient condition declining or worsening    Shift Goals  Clinical Goals: 1:1 sitter, Safety  Patient Goals: shower, rest  Family Goals: pippa    Progress made toward(s) clinical / shift goals: Patient A&Ox4, calm and cooperative. Patient denies any pain or discomfort at this time. Patient showered last night. Patient on 1:1 sitter for safety. All needs attended.     Patient is not progressing towards the following goals: NA

## 2024-10-08 NOTE — DISCHARGE SUMMARY
Discharge Summary    CHIEF COMPLAINT ON ADMISSION  Chief Complaint   Patient presents with    Other     Abnormal behavior per staff at [facility]       Reason for Admission  EMS     Admission Date  9/26/2024    CODE STATUS  Full Code    HPI & HOSPITAL COURSE    Lee Yadav is a 39 y.o. male with history of bipolar disorder who presented 9/26/2024 from psychiatric facility [facility], where he has been hospitalized for his psychiatric illness, with concern for periods of altered mentation in the last 2 days.    Per ERP Dr. Bentley who spoke to MD at psychiatric facility, patient would have episodes of becoming non verbal and non responsive, looking with a blank stare, but will track but would not respond to questions.  Patient himself states that he had several orthostatic syncopal episode, most recently during the day of admission when he was feeling dizzy, passed out and fell hitting his right hip.   Reportedly he was started on Thorazine 2 days ago  Denies any GI illness.  Multiple psychiatric medications noted in the patient outpatient medications list: Thorazine, lithium, Latuda, olanzapine, trazodone    In ER vital stable  Prolactin was checked and elevated at 38  CPK elevated at 528.  Alcohol was not elevated.  UDS: Cannabinoids, benzodiazepines.  UA is negative.  VBG is unremarkable.  CT head without contrast negative.  Pelvic x-ray: Degenerative changes in the sacroiliac joints.  EKG: Sinus rhythm heart rate 69, no significant T/ST fluctuations, QTc is not prolonged.     While admitted, he had another syncopal episode and injured his face.An echo and a subsequent cardiac MRI were ordered to consider cardiac causes of syncope and did not show significant abnormalities. Qtc was not prolonged. His syncope likely assessed as a suspected side effect of his antipsychotics causing ineffective vascular tone reflex, orthostatic hypotension, and orthostatic syncope.     For his encephalopathy, his work up including a CT  head wo contrast was negative. Prolactin was mildly elevated but nonspecific and his CK was elevated as well, which could both also be attributed to a SE of his neuroleptics. He had no rigidity on his exam. TSH, TrAB, [id number] normal. His EEG did not show episodes of seizure activity.  His Zyprexa was decreased to 10mg once daily and he was started on melatonin 5mg at bedtime as needed for insomnia. Psychiatry was following. Neurology was consulted and did not recommend initiating AEDs at this time. He remains on legal hold. They recommend a one time follow up in their seizure clinic. He was ultimately medically cleared for transfer to psychiatric facility. He was accepted back to [facility] 10/8     Therefore, he is discharged in good and stable condition to a psychiatric hospital.    The patient met 2-midnight criteria for an inpatient stay at the time of discharge.    Discharge Date  10/8/24    FOLLOW UP ITEMS POST DISCHARGE  Close follow up with psychiatry   Follow up with neurology seizure clinic     DISCHARGE DIAGNOSES  Active Problems:    Acute encephalopathy (POA: Yes)    Syncope (POA: Clinically Undetermined)    Suicidal ideations (POA: Clinically Undetermined)    Traumatic rhabdomyolysis (HCC) (POA: Yes)    Primary hypertension (POA: Yes)    Acquired hypothyroidism (POA: Yes)    Anxiety (POA: Yes)    Psychosis (HCC) (POA: Yes)    Ground-level fall (POA: Yes)    Insomnia due to other mental disorder (POA: Yes)    Marijuana use (POA: Yes)    Abrasion of skin of lip (POA: Yes)  Resolved Problems:    * No resolved hospital problems. *      FOLLOW UP  No future appointments.  No follow-up provider specified.    MEDICATIONS ON DISCHARGE     Medication List        START taking these medications        Instructions   acetaminophen 500 MG Tabs  Commonly known as: Tylenol   Take 2 Tablets by mouth every 6 hours as needed for Mild Pain or Fever.  Dose: 1,000 mg     hydrOXYzine HCl 50 MG Tabs  Commonly known as: Atarax    Take 1 Tablet by mouth every 6 hours as needed for Anxiety.  Dose: 50 mg     melatonin 5 mg Tabs   Take 1 Tablet by mouth at bedtime as needed (insomnia).  Dose: 5 mg     ondansetron 4 MG Tbdp  Commonly known as: Zofran ODT   Take 1 Tablet by mouth every four hours as needed for Nausea/Vomiting (give PO if no IV route available).  Dose: 4 mg            CHANGE how you take these medications        Instructions   * LORazepam 2 MG tablet  What changed: You were already taking a medication with the same name, and this prescription was added. Make sure you understand how and when to take each.  Commonly known as: Ativan   Take 1 Tablet by mouth at bedtime for 7 days.  Dose: 2 mg     * LORazepam 1 MG Tabs  What changed:   medication strength  how much to take  when to take this  reasons to take this  additional instructions  Commonly known as: Ativan   Take 1 Tablet by mouth 2 times a day for 7 days.  Dose: 1 mg           * This list has 2 medication(s) that are the same as other medications prescribed for you. Read the directions carefully, and ask your doctor or other care provider to review them with you.                CONTINUE taking these medications        Instructions   gemfibrozil 600 MG Tabs  Commonly known as: Lopid   Take 600 mg by mouth 2 times a day.  Dose: 600 mg     Latuda 60 MG Tabs  Generic drug: Lurasidone HCl   Take 120 mg by mouth at bedtime. 2 tablets= 60mg  Dose: 120 mg     levothyroxine 112 MCG Tabs  Commonly known as: Synthroid   Take 112 mcg by mouth every morning on an empty stomach.  Dose: 112 mcg     lithium 600 MG capsule   Take 600 mg by mouth every day.  Dose: 600 mg     losartan 25 MG Tabs  Commonly known as: Cozaar   Take 25 mg by mouth every day. Hold if BP <100/60  Dose: 25 mg     Taltz 80 MG/ML Sosy  Generic drug: Ixekizumab   Inject 80 mg under the skin every 28 days.  Dose: 80 mg            STOP taking these medications      chlorproMAZINE 200 MG tablet  Commonly known as:  Thorazine     chlorpromazine 25 MG/ML Soln  Commonly known as: Thorazine     diphenhydrAMINE 50 MG/ML Soln  Commonly known as: Benadryl     hydrOXYzine pamoate 50 MG Caps  Commonly known as: Vistaril     OLANZAPINE IM     olanzapine zydis 20 MG disintegrating tablet  Commonly known as: ZyPREXA     traZODone 50 MG Tabs  Commonly known as: Desyrel     vitamin D3 1000 Unit (25 mcg) Tabs  Commonly known as: Cholecalciferol              Allergies  No Known Allergies    DIET  Orders Placed This Encounter   Procedures    Diet Order Diet: Regular; Tray Modifications (optional): Safety Tray - Plastic dishware, utensils unwrapped (Suicide Watch)     Paperware only on meal tray.     Standing Status:   Standing     Number of Occurrences:   1     Order Specific Question:   Diet:     Answer:   Regular [1]     Order Specific Question:   Tray Modifications (optional)     Answer:   Safety Tray - Plastic dishware, utensils unwrapped (Suicide Watch)       ACTIVITY  As tolerated.  Weight bearing as tolerated    CONSULTATIONS  Psych  Neuro    PROCEDURES  None    LABORATORY  Lab Results   Component Value Date    SODIUM 141 09/27/2024    POTASSIUM 4.0 09/27/2024    CHLORIDE 107 09/27/2024    CO2 23 09/27/2024    GLUCOSE 90 09/27/2024    BUN 20 09/27/2024    CREATININE 0.99 09/27/2024    CREATININE 1.3 06/12/2005        Lab Results   Component Value Date    WBC 6.9 09/27/2024    HEMOGLOBIN 13.3 (L) 09/27/2024    HEMATOCRIT 40.1 (L) 09/27/2024    PLATELETCT 249 09/27/2024        Total time of the discharge process exceeds >35 minutes.

## 2024-11-25 ENCOUNTER — OFFICE VISIT (OUTPATIENT)
Dept: BEHAVIORAL HEALTH | Facility: PSYCHIATRIC FACILITY | Age: 39
End: 2024-11-25
Payer: MEDICAID

## 2024-11-25 ENCOUNTER — HOSPITAL ENCOUNTER (OUTPATIENT)
Dept: LAB | Facility: MEDICAL CENTER | Age: 39
End: 2024-11-25
Payer: MEDICAID

## 2024-11-25 VITALS
HEART RATE: 79 BPM | BODY MASS INDEX: 28.01 KG/M2 | HEIGHT: 76 IN | SYSTOLIC BLOOD PRESSURE: 116 MMHG | WEIGHT: 230 LBS | OXYGEN SATURATION: 95 % | DIASTOLIC BLOOD PRESSURE: 66 MMHG

## 2024-11-25 DIAGNOSIS — Z79.899 HIGH RISK MEDICATION USE: ICD-10-CM

## 2024-11-25 DIAGNOSIS — F31.5 BIPOLAR DISORDER, CURRENT EPISODE DEPRESSED, SEVERE, WITH PSYCHOTIC FEATURES (HCC): ICD-10-CM

## 2024-11-25 DIAGNOSIS — F31.5 BIPOLAR DISORDER, CURRENT EPISODE DEPRESSED, SEVERE, WITH PSYCHOTIC FEATURES (HCC): Primary | ICD-10-CM

## 2024-11-25 DIAGNOSIS — F31.32 BIPOLAR AFFECTIVE DISORDER, CURRENTLY DEPRESSED, MODERATE (HCC): ICD-10-CM

## 2024-11-25 LAB
25(OH)D3 SERPL-MCNC: 36 NG/ML (ref 30–100)
ALBUMIN SERPL BCP-MCNC: 4.3 G/DL (ref 3.2–4.9)
ALBUMIN/GLOB SERPL: 1.7 G/DL
ALP SERPL-CCNC: 40 U/L (ref 30–99)
ALT SERPL-CCNC: 9 U/L (ref 2–50)
ANION GAP SERPL CALC-SCNC: 13 MMOL/L (ref 7–16)
AST SERPL-CCNC: 14 U/L (ref 12–45)
BASOPHILS # BLD AUTO: 0.7 % (ref 0–1.8)
BASOPHILS # BLD: 0.05 K/UL (ref 0–0.12)
BILIRUB SERPL-MCNC: 0.2 MG/DL (ref 0.1–1.5)
BUN SERPL-MCNC: 16 MG/DL (ref 8–22)
CALCIUM ALBUM COR SERPL-MCNC: 9.2 MG/DL (ref 8.5–10.5)
CALCIUM SERPL-MCNC: 9.4 MG/DL (ref 8.5–10.5)
CHLORIDE SERPL-SCNC: 107 MMOL/L (ref 96–112)
CO2 SERPL-SCNC: 23 MMOL/L (ref 20–33)
CREAT SERPL-MCNC: 1.05 MG/DL (ref 0.5–1.4)
EOSINOPHIL # BLD AUTO: 0.24 K/UL (ref 0–0.51)
EOSINOPHIL NFR BLD: 3.2 % (ref 0–6.9)
ERYTHROCYTE [DISTWIDTH] IN BLOOD BY AUTOMATED COUNT: 42.9 FL (ref 35.9–50)
GFR SERPLBLD CREATININE-BSD FMLA CKD-EPI: 93 ML/MIN/1.73 M 2
GLOBULIN SER CALC-MCNC: 2.6 G/DL (ref 1.9–3.5)
GLUCOSE SERPL-MCNC: 91 MG/DL (ref 65–99)
HCT VFR BLD AUTO: 42.9 % (ref 42–52)
HGB BLD-MCNC: 13.8 G/DL (ref 14–18)
IMM GRANULOCYTES # BLD AUTO: 0.05 K/UL (ref 0–0.11)
IMM GRANULOCYTES NFR BLD AUTO: 0.7 % (ref 0–0.9)
LITHIUM SERPL-MCNC: 0.6 MMOL/L (ref 0.6–1.2)
LYMPHOCYTES # BLD AUTO: 2.69 K/UL (ref 1–4.8)
LYMPHOCYTES NFR BLD: 35.5 % (ref 22–41)
MCH RBC QN AUTO: 28 PG (ref 27–33)
MCHC RBC AUTO-ENTMCNC: 32.2 G/DL (ref 32.3–36.5)
MCV RBC AUTO: 87.2 FL (ref 81.4–97.8)
MONOCYTES # BLD AUTO: 0.62 K/UL (ref 0–0.85)
MONOCYTES NFR BLD AUTO: 8.2 % (ref 0–13.4)
NEUTROPHILS # BLD AUTO: 3.93 K/UL (ref 1.82–7.42)
NEUTROPHILS NFR BLD: 51.7 % (ref 44–72)
NRBC # BLD AUTO: 0 K/UL
NRBC BLD-RTO: 0 /100 WBC (ref 0–0.2)
PLATELET # BLD AUTO: 275 K/UL (ref 164–446)
PMV BLD AUTO: 10.9 FL (ref 9–12.9)
POTASSIUM SERPL-SCNC: 3.7 MMOL/L (ref 3.6–5.5)
PROT SERPL-MCNC: 6.9 G/DL (ref 6–8.2)
RBC # BLD AUTO: 4.92 M/UL (ref 4.7–6.1)
SODIUM SERPL-SCNC: 143 MMOL/L (ref 135–145)
T4 FREE SERPL-MCNC: 1.05 NG/DL (ref 0.93–1.7)
TSH SERPL-ACNC: 3.57 UIU/ML (ref 0.35–5.5)
VIT B12 SERPL-MCNC: 547 PG/ML (ref 211–911)
WBC # BLD AUTO: 7.6 K/UL (ref 4.8–10.8)

## 2024-11-25 PROCEDURE — 80178 ASSAY OF LITHIUM: CPT

## 2024-11-25 PROCEDURE — 3078F DIAST BP <80 MM HG: CPT

## 2024-11-25 PROCEDURE — 82607 VITAMIN B-12: CPT

## 2024-11-25 PROCEDURE — 84439 ASSAY OF FREE THYROXINE: CPT

## 2024-11-25 PROCEDURE — 84443 ASSAY THYROID STIM HORMONE: CPT

## 2024-11-25 PROCEDURE — 82306 VITAMIN D 25 HYDROXY: CPT

## 2024-11-25 PROCEDURE — 99204 OFFICE O/P NEW MOD 45 MIN: CPT | Mod: GC

## 2024-11-25 PROCEDURE — 80053 COMPREHEN METABOLIC PANEL: CPT

## 2024-11-25 PROCEDURE — 36415 COLL VENOUS BLD VENIPUNCTURE: CPT

## 2024-11-25 PROCEDURE — 3074F SYST BP LT 130 MM HG: CPT

## 2024-11-25 PROCEDURE — 84425 ASSAY OF VITAMIN B-1: CPT

## 2024-11-25 PROCEDURE — 85025 COMPLETE CBC W/AUTO DIFF WBC: CPT

## 2024-11-25 RX ORDER — GABAPENTIN 100 MG/1
100 CAPSULE ORAL 3 TIMES DAILY PRN
Qty: 90 CAPSULE | Refills: 1 | Status: SHIPPED | OUTPATIENT
Start: 2024-11-25 | End: 2024-12-02

## 2024-11-25 RX ORDER — LURASIDONE HYDROCHLORIDE 60 MG/1
60 TABLET, FILM COATED ORAL DAILY
Qty: 10 TABLET | Refills: 0 | Status: SHIPPED | OUTPATIENT
Start: 2024-11-25 | End: 2024-12-02

## 2024-11-25 RX ORDER — ARIPIPRAZOLE 5 MG/1
5 TABLET ORAL DAILY
Qty: 14 TABLET | Refills: 0 | Status: SHIPPED | OUTPATIENT
Start: 2024-11-25 | End: 2024-12-02 | Stop reason: DRUGHIGH

## 2024-11-25 RX ORDER — GEMFIBROZIL 600 MG/1
600 TABLET, FILM COATED ORAL 2 TIMES DAILY
Qty: 60 TABLET | Refills: 2 | Status: SHIPPED | OUTPATIENT
Start: 2024-11-25

## 2024-11-25 RX ORDER — CLONAZEPAM 0.5 MG/1
0.5 TABLET ORAL
Qty: 7 TABLET | Refills: 0 | Status: SHIPPED | OUTPATIENT
Start: 2024-11-25 | End: 2024-12-02

## 2024-11-25 ASSESSMENT — FIBROSIS 4 INDEX: FIB4 SCORE: 1.05

## 2024-11-25 NOTE — PATIENT INSTRUCTIONS
Here is the overall game plan for treatment:  - We will transition off of the Latuda and get the Abilify to a therapeutic dose. We'll start this week by decreasing the Latuda to 60mg daily and starting Abilify at 5mg. Take both through this week, and we will have a virtual appointment next Monday, 12/2. At that time, we will continue adjusting the doses to eventually come off of the Latuda entirely.  - Please get the following labs before next appointment:  - Lithium level  - Thyroid panel  - CBC  - CMP  - Vitamin D levels  - Vitamin B12 levels  - Vitamin B1 levels  - We will prescribe Klonopin 0.5mg for a 7 day supply. Please take only in exceptional situations; we recommend breaking in half to take as 0.25mg doses as needed.   - We will start gabapentin 100mg three tablets daily as needed for anxiety; you can take all at once at bedtime or throughout at day.     ------------------------------------------------------------------------    [email or web address]    [email or web address]    Therapy in a Nutshell    Crisis Plan:  Calling clinic. Calling a 24-hour crisis hotline number 988. Calling 911. Utilizing the ED in the event of an emergency.     1-800-QUIT-NOW (763-765-0722) or enroll online at [email or web address]    [place] Resources    [facility]  Address: [address] [place] [address]  Phone: [phone]  Notes: [facility] is a consortium of private practitioners who advertise together, and are in business separately as the following: Denisha Loaiza, Marriage and Family Therapist, Licensed Clinical Alcohol & Drug Counselor; Mike Wong [facility] Practitioner; Tiny He, Occupational Therapist and Reiki Master; Katheryn Troy, Marriage and Family Therapist  [facility]  Address: [address] [place] [address]  Phone: [phone]  Notes: ADHD skills building, ACT Therapy, CBT, EMDR, Trauma focused therapy, Play based therapy, Family therapy  [facility]aling  [facility]  Address: [address], [place] [address]  Phone: [phone]  Notes: Depression, Anxiety and stress management, marriage and family, DBT, EMDR  [facility]  Address: [address], [place] [address]  Phone: [phone]  Notes: wellness, mindfulness, self-care, LGBTQ+, goal setting and achievement   [facility]  Address: [address], [place] [address]  Phone: [phone]  Notes: Cognitive Behavioral Therapy (CBT), Dialectical Behavioral Therapy (DBT), Emotionally Focused Therapy (EFT), Gottman Couples Therapy, Positive Discipline and Child Education, Play Therapy  [facility]  Address: [address] [facility] [address]  Phone: [phone]  Notes: substance abuse, domestic violence, anger management, marriage and family counseling, mindfulness as well as Substance Abuse and Anger Management/Domestic Violence Groups              [facility]: Denisha Fallon, Psy.D, BRIGHT Petersen, Connie Crawford, PhD  Address: [id number] [place] [address]  Phone: [phone]  Notes: sleep disorders and health related concerns such as chronic pain, depression and anxiety  Nate Winston, Ph.D at [facility]  Address: [address], [place] [address]  Phone: [phone]  Notes: sport psychology, resilience, motivation, self-talk, confidence  [facility]  Address: [address], [address], [facility], [address]  Phone: [phone]  Notes: Certified [facility] ([facility]), offering peer services, case management, crisis intervention, Basic Skills Training, and Psycho-Social Rehabilitation; provides MAT for adolescent and adult opioid users, family therapy   [facility]  Address: [address] [place] [address]  Phone: [phone]  Notes: Many group class options, Domestic Violence, Anger Management, Ugandan  speaking, Substance Abuse, Peaceful families parenting, Family therapy, Sliding scale fees  [facility]  Address: [address] [place] [address] [address] [place] [address]  Phone: [phone]   Notes: EMDR, Maltese speaking, Psychedelic assisted therapy, couples and family counseling  [facility]  Address: [address] [place] [address]  Phone: [phone]  Notes: Testing: Cognitive evaluations, Learning disability evaluations, ADHD evaluations, Pre-employment evaluations, Fitness for duty evaluations, Bariatric surgery evaluations, Pain procedure evaluations  [facility]  Address: [address] [place] [address] [address], [place] [address]  Phone: [phone]  Notes: Borderline Personality Disorder, EMDR, Grief Counseling, Couples/family/parenting

## 2024-11-25 NOTE — PROGRESS NOTES
"[facility] PSYCHIATRIC EVALUATION    {transferpatient (Optional):33055::\"A full psychiatric evaluation was performed due to transition of care to new resident/fellow physician. All elements of a psychiatric evaluation were reviewed to ensure safe and effective care with transition. \"}    Evaluation completed by: Elias Villa M.D.   Date of Service: 11/25/2024  Appointment type: {[facility]/ Appt Type:86217}  Attending:  {[facility] Attendings:26277}  Information below was collected from: { Information Collected From:96493:a}    CHIEF COMPLIANT:  No chief complaint on file.      HPI:   Lee Yadav is a 39 y.o. old male who presents today for new psychiatric evaluation for the assessment of No chief complaint on file.    Mother and father present.    Pt reports decreased lynn in activities, fixated on negative thoughts, feeling anxious and \"bad all the time.\" Worryingg all the time. Pt worries significantly. \"Paranoid\" when going out in public, worries that \"people might recognize me.\" Once he was out walking in a park with father and overheard pcnic and felt they were talking about him. Similiary in a grocery store.     Stopped taper of Ativan as of last week. While on the taper, pt was reporting adequate sleep of approximately 8 hours. Nowadays sleep has been difficult, would be in bed for upward of 12 hours and may only get 5-6 hours of broken sleep. As of last week, no longer on medication. Anxiety has subsequently increased. Also feels there are \"murmurings that people in [place] are talking about me.\" He does feel people at times look at me funny and believes they heard something about his hospital stays. He also knows there are other pts who were at [facility] while he was there and worries they might get involved in legal repercussions about his behaviors during his hospitalization. Also had a phone call with CPS regarding an old case at his workplace in [employer] and felt during the phone call he " "\"sounded paranoid\" and may have said things that would increase suspicion about his involvement.     Trialed Zoloft with Dr. Villa in [facility] outpatient and promptly discontinued after he did not sleep the subsequent day.     First manic episode was 19yo when he was toward the end of his senior year. The onset was rapid. First sign was when the parents got a phone call from a  who noted his behavior was unusual. Both the teacher and parents both noted he was more energetic and increasingly anxious about graduation requirements and plans to go to college. He progressively started having paranoid thoughts the FBI was following him. He additionally started having friction with friends who thought he was acting odd and he began wondering if they were purposefully turning against him. The pt was more irritable and short excessive energy. He was not sleeping for several days and his memory is obscured during that period. But this continued for several days, and the parents eventually determined he needed to be assessed at the ED for \"irrational\" thoughts, at which point it was determined he needed inpatient hospitalization. Was not using substances at that time. Only started drinking in college.     Depakote - ineffective, pt felt \"empty\" and \"no emotions\"  Seroquel  - opposite effect of Seroquel, made pt \"more manic.\" Increased anxiety and dysregulated.  Lamictal - appeared to be effective for stabilizing mood and thoughts. He was around 27yo when he was only on lithium. His kidney function was apparently not good so the lithium was decreased and was put on Lamictal adjunct.   Trazodone - used for sleep, not very effective  Ambien - effective somewhat for initial sleep onset but unclear if helps with maintaining sleep  Klonopin - induces sedation, does not help with racing thoughts; previously used for anxiety, not for insomnia    Currently on:  - Lithium 900 ER qHS  - Latuda 80mg daily   - Gemfibrozil 600mg " "BID  - Losartan 25mg daily  - Synthroid 112mcg    [facility] [address], [address] [place]    Order  - [place] level  - TSH  - CBC  - CMP  - Vitamin D  - B12    - Cross taper of Latuda and Abilify  - PRN anxiolytic  - Labs  - 1 week f/u virtually  - Wrap-up literature on all medications prescribed  - Klonopin 0.5mg 7 day supply, cut in half for anxiety  - Gabapentin 100mg TID PRN first-line for baseline anxiety, can take all at once at bedtime or throughout at day    Will monitor blood pressure, currently on losartan 25mg daily, could be reasonable to d/c if continuing to have orthostatic episodes.    No recreational substances.   PSYCHIATRIC REVIEW OF SYSTEMS: current symptoms as reported by pt.  Depression: {Depression Diagnosis:10287::\"Denies depressed mood or anhedonia\"}  Carina: {Bipolar disorder:99616::\"Patient denies any change in mood, increased energy, or marked irritability\"}  Anxiety/Panic Attacks: {Anxiety Symptoms:51850}  Trauma: {lucptsd:21400}  Psychosis: {lucpsychosis:40435}  ADHD: {EGRADHDinatt:06235}, {EGRADHDhyper:64626}  {Additional PsychROS (Optional):58560}    REVIEW OF SYSTEMS   ROS    PAST PSYCHIATRIC HISTORY  Inpatient Psychiatric Hospitalizations: {lucdenies:21471}  Outpatient Psychiatric Care:   Previous: {lucdenies:21471}  Psychiatric Medications:    Previous:  {lucdenies:21471}   Self Harm:    Current: {lucdenies:21471}   Past: {lucdenies:21471}  Suicide Attempts:    Current: {lucdenies:21471}   Previous: {lucdenies:21471}  Access to Firearms: {lucdenies:49238}  Access to Medications: {lucdenies:52107}     PAST MEDICAL HISTORY  Past Medical History:   Diagnosis Date   • Bipolar disorder (HCC)      No Known Allergies  No past surgical history on file.   No family history on file.  Social History     Socioeconomic History   • Marital status: Single   Tobacco Use   • Smoking status: Never   • Smokeless tobacco: Never   Vaping Use   • Vaping status: Never Used   Substance and Sexual Activity " "  • Alcohol use: Not Currently   • Drug use: Yes     Types: Inhaled     Comment: xavier     Social Drivers of Health     Financial Resource Strain: High Risk (9/5/2024)    Received from [facility]    Overall Financial Resource Strain (CARDIA)    • Difficulty of Paying Living Expenses: Hard   Food Insecurity: No Food Insecurity (9/5/2024)    Received from [facility]    Hunger Vital Sign    • Worried About Running Out of Food in the Last Year: Never true    • Ran Out of Food in the Last Year: Never true   Transportation Needs: No Transportation Needs (9/5/2024)    Received from [facility]    PRAPARE - Transportation    • Lack of Transportation (Medical): No    • Lack of Transportation (Non-Medical): No   Intimate Partner Violence: Not At Risk (9/26/2024)    Humiliation, Afraid, Rape, and Kick questionnaire    • Fear of Current or Ex-Partner: No    • Emotionally Abused: No    • Physically Abused: No    • Sexually Abused: No   Housing Stability: Low Risk  (9/5/2024)    Received from [facility]    Housing Stability Vital Sign    • Unable to Pay for Housing in the Last Year: No    • Number of Times Moved in the Last Year: 0    • Homeless in the Last Year: No     No past surgical history on file.    PSYCHIATRIC EXAMINATION   /66 (BP Location: Left arm, Patient Position: Sitting, BP Cuff Size: Large adult)   Pulse 79   Ht 1.93 m (6' 4\")   Wt 104 kg (230 lb)   SpO2 95%   BMI 28.00 kg/m²   Musculoskeletal: {lucmsk:46512}  Appearance: {lucgeneral:70908}, {lucattitude:98876}  Thought Process:  {lucthoughtprocess:58661}  Abnormal or Psychotic Thoughts: {UHTHOUGHTCONTENT:04668::\"No evidence of auditory or visual hallucinations, and no overt delusions noted\"}  Speech: {lucspeech:02117}  Mood: {lucmood:46369}  Affect: {lucaffect:50571}  SI/HI: {lucsi:60569}  Orientation: {lucalert:53585}  Recent and Remote Memory: {lucmemory:28655}  Attention Span and " Concentration:  Insight/Judgement into symptoms: {lucinsight:88400}  Neurological Testing (MSSE Score and/or clock drawing): {lucneuro:51053}      SCREENINGS:      9/26/2024     9:26 PM 9/29/2024     8:10 PM   Depression Screen (PHQ-2/PHQ-9)   PHQ-2 Total Score 5 5   PHQ-9 Total Score 15 18          PREVENTATIVE CARE  {Medication Monitoring (Optional):94930}       ASSESSMENT  Lee Yadav is a 39 y.o. old male who presents today for new psychiatric evaluation for the assessment of No chief complaint on file.      [place]  records  { :[address]}    CURRENT RISK ASSESSMENT       Suicide: {[facility] RATINGS:84038048}       Homicide: {[facility] RATINGS:91985563}       Self-Harm: {[facility] RATINGS:10542542}       Relapse: {[facility] RATINGS:53543468}       Crisis Safety Plan Reviewed {YES/NO/NOT INDICATED:52985}    DIAGNOSES/PLAN  Problem List Items Addressed This Visit    None         Medication options, alternatives (including no medications) and medication risks/benefits/side effects were discussed in detail.  The patient was advised to call, message clinician on [employer], or come in to the clinic if symptoms worsen or if questions/issues regarding their medications arise.  The patient verbalized understanding and agreement.    The patient was educated to call 911, call the suicide hotline, or go to the local ER if having thoughts of suicide or homicide.  The patient verbalized understanding and agreement.   The proposed treatment plan was discussed with the patient who was provided the opportunity to ask questions and make suggestions regarding alternative treatment. Patient verbalized understanding and expressed agreement with the plan.      No follow-ups on file.      This appointment was supervised by attending psychiatrist, {[facility] Attendings:74821}, who agrees with assessment and treatment plan.  See attending attestation for more details.    Relapse: Low       Crisis Safety Plan Reviewed Yes    DIAGNOSES/PLAN  Problem List Items Addressed This Visit          Psychiatry Problems    Bipolar affective disorder, currently depressed, moderate (HCC) - Primary     Problem type: Chronic with acute exacerbation, current episode depressive.    Assessment: Pt no longer experiencing symptoms concerning for yamila. More concerning for persisting anxious/paranoid thoughts that are difficult to control and distressing to the pt. He has been isolative to his parents' home with ongoing poor sleep, decreased interest in activities, feelings of hopelessness, decreased energy/motivation/concentration, and passive suicidal ideation. Reports Latuda has not been effective as adjunct to lithium. Does not appear it has been effectively mitigating depressive and psychotic (delusions/paranoia) symptoms. Therefore discussed with the pt, will cross-taper to Abilify with frequent follow-ups to monitor SI and mood. Additionally discussed pt's significant anxiety and difficulty with maintaining sleep since tapering off of Ativan. Given the tenuous nature of his presentation, as he is still well within a critical period following a manic episode, discussed PRN Klonopin if sleep becomes dysregulated. Discussed the importance of optimizing other sleep factors prior to resorting to Klonopin. Will also provide PRN gabapentin for anxiety, which can be dosed through the day or all at nighttime.     While cross-tapering, will monitor blood pressure; currently on losartan 25mg daily, could be reasonable to d/c if continuing to have orthostasis.    Plan  Medication:   - Decrease Latuda to 60mg daily  - Start Abilify 5mg daily  - Start gabapentin 100mg TID PRN for anxiety, can take all at nighttime if needed  - Start Klonopin 0.5mg daily PRN for anxiety/sleep, can break in half to minimize use  - Continue lithium 900mg qHS    Therapy: Discussed pt can enroll in [facility] psychotherapy clinic for weekly  therapy; recommend given pt's impairment.    Other Orders:  - Lithium level  - TSH  - CBC  - CMP  - Vitamin D  - B12         Relevant Medications    gemfibrozil (LOPID) 600 MG Tab       Other    High risk medication use    Relevant Orders    LITHIUM (Completed)    THYROID PANEL WITH TSH    CBC WITH DIFFERENTIAL (Completed)    Comp Metabolic Panel (Completed)    VITAMIN D,25 HYDROXY (DEFICIENCY) (Completed)    VITAMIN B12 (Completed)    VITAMIN B1 (Completed)      Medication options, alternatives (including no medications) and medication risks/benefits/side effects were discussed in detail.  The patient was advised to call, message clinician on [employer], or come in to the clinic if symptoms worsen or if questions/issues regarding their medications arise.  The patient verbalized understanding and agreement.    The patient was educated to call 911, call the suicide hotline, or go to the local ER if having thoughts of suicide or homicide.  The patient verbalized understanding and agreement.   The proposed treatment plan was discussed with the patient who was provided the opportunity to ask questions and make suggestions regarding alternative treatment. Patient verbalized understanding and expressed agreement with the plan.      Follow-up in 1 week, virtual.      This appointment was supervised by attending psychiatrist, Sorin Busby M.D. , who agrees with assessment and treatment plan.  See attending attestation for more details.

## 2024-11-28 LAB — VIT B1 BLD-MCNC: 178 NMOL/L (ref 70–180)

## 2024-12-02 ENCOUNTER — APPOINTMENT (OUTPATIENT)
Dept: BEHAVIORAL HEALTH | Facility: PSYCHIATRIC FACILITY | Age: 39
End: 2024-12-02
Payer: MEDICAID

## 2024-12-02 DIAGNOSIS — F31.5 BIPOLAR DISORDER, CURRENT EPISODE DEPRESSED, SEVERE, WITH PSYCHOTIC FEATURES (HCC): ICD-10-CM

## 2024-12-02 DIAGNOSIS — F31.32 BIPOLAR AFFECTIVE DISORDER, CURRENTLY DEPRESSED, MODERATE (HCC): ICD-10-CM

## 2024-12-02 PROCEDURE — 99214 OFFICE O/P EST MOD 30 MIN: CPT | Mod: GC,GT

## 2024-12-02 RX ORDER — GABAPENTIN 300 MG/1
300 TABLET, FILM COATED ORAL 2 TIMES DAILY
Qty: 60 TABLET | Refills: 0 | Status: SHIPPED | OUTPATIENT
Start: 2024-12-02 | End: 2024-12-16

## 2024-12-02 RX ORDER — LURASIDONE HYDROCHLORIDE 40 MG/1
40 TABLET, FILM COATED ORAL
Qty: 7 TABLET | Refills: 0 | Status: SHIPPED | OUTPATIENT
Start: 2024-12-02 | End: 2024-12-09

## 2024-12-02 RX ORDER — ARIPIPRAZOLE 10 MG/1
10 TABLET ORAL DAILY
Qty: 7 TABLET | Refills: 0 | Status: SHIPPED | OUTPATIENT
Start: 2024-12-02 | End: 2024-12-09

## 2024-12-02 NOTE — PROGRESS NOTES
"[facility] Outpatient Psychiatric Follow Up Note  Evaluation completed by: Elias Villa M.D.   Date of Service: 12/02/2024  Appointment type: virtual/telepsychiatry appointment: This evaluation was conducted via Zoom using secure and encrypted videoconferencing technology. The patient was in their home in the [facility].   The patient's identity was confirmed and verbal consent was obtained for this virtual visit.  Attending:  Francisco Javier Duff M.D.  Information below was collected from: patient, patient's mother, and patient's father    CHIEF COMPLIANT:  No chief complaint on file.    HPI:   Lee Yadav is a 39 y.o. old male who presents today for regularly scheduled follow up for assessment of BD1.    Pt was accompanied by parents in video call.     Reports no changes to obsessive/paranoid thoughts. Feels especially being in smaller town ([place]), feels there are people still thinking \"there's something wrong with me.\" Denies significant side effects including increased anxiety and further disruption in sleep. Reports frequent awakening at nighttime and difficulty initiating sleep 2/2 worrying. Does take all 300mg of gabapentin PRN at nighttime, at this time does not report significant improvement in sleep. Mood remains depressed and pt remains isolative, stays home. Does have holiday plans with family, which has been a source of some anxiety for the pt. He has otherwise tolerated the Abilify with no significant side effects.    - Considering weekly therapy, will disccuss next appointment  - Reasonable to get medical records from [facility] regarding hospitalization    PSYCHIATRIC REVIEW OF SYSTEMS:current symptoms as reported by pt.  Depression: Depressed mood, Anhedonia, Sense of hopelessness, Low energy, Difficulty concentrating, and Passive thoughts of death  Carina:  No recurrence of euphoria or excessive irritability in the context of severely disrupted sleep and goal-directed/reckless " "activity.  Anxiety/Panic Attacks: insomnia, racing thoughts, feelings of losing control, difficulty concentrating; isolating at home d/t anxiety, does not want to go out in public.  Trauma: Patient reports no signs or symptoms indicative of PTSD  Psychosis:  Distressing, anxious/paranoid thoughts that \"people know about me\" or \"people know what I did\" while in the hospital; exacerbated when out in public, says has had moments of hearing people talking from a distance and believing they're talking about him.  ADHD: No reported distressing hyperactive or inattentive symptoms aside from the above.    CURRENT MEDICATIONS    Current Outpatient Medications:     ARIPiprazole (ABILIFY) 10 MG Tab, Take 1 Tablet by mouth every day for 7 days., Disp: 7 Tablet, Rfl: 0    lurasidone (LATUDA) 40 MG Tab, Take 1 Tablet by mouth with dinner for 7 days., Disp: 7 Tablet, Rfl: 0    Gabapentin, Once-Daily, 300 MG Tab, Take 300 mg by mouth 2 times a day for 30 days., Disp: 60 Tablet, Rfl: 0    gemfibrozil (LOPID) 600 MG Tab, Take 1 Tablet by mouth 2 times a day., Disp: 60 Tablet, Rfl: 2    acetaminophen (TYLENOL) 500 MG Tab, Take 2 Tablets by mouth every 6 hours as needed for Mild Pain or Fever., Disp: , Rfl:     melatonin 5 mg Tab, Take 1 Tablet by mouth at bedtime as needed (insomnia)., Disp: , Rfl:     levothyroxine (SYNTHROID) 112 MCG Tab, Take 112 mcg by mouth every morning on an empty stomach., Disp: , Rfl:     lithium 600 MG capsule, Take 900 mg by mouth every day., Disp: , Rfl:     losartan (COZAAR) 25 MG Tab, Take 25 mg by mouth every day. Hold if BP <100/60, Disp: , Rfl:     Ixekizumab (TALTZ) 80 MG/ML Solution Prefilled Syringe, Inject 80 mg under the skin every 28 days., Disp: , Rfl:      REVIEW OF SYSTEMS   Review of Systems   Psychiatric/Behavioral:  Positive for depression. The patient is nervous/anxious.      Neurologic: no tics, tremors, dyskinesias. The patient denies dizzniess, syncope, falls. Ambulates " independently    PAST MEDICAL HISTORY  Past Medical History:   Diagnosis Date    Bipolar disorder (HCC)      No Known Allergies  No past surgical history on file.   No family history on file.  Social History     Socioeconomic History    Marital status: Single   Tobacco Use    Smoking status: Never    Smokeless tobacco: Never   Vaping Use    Vaping status: Never Used   Substance and Sexual Activity    Alcohol use: Not Currently    Drug use: Yes     Types: Inhaled     Comment: xavier     Social Drivers of Health     Financial Resource Strain: High Risk (9/5/2024)    Received from [facility]    Overall Financial Resource Strain (CARDIA)     Difficulty of Paying Living Expenses: Hard   Food Insecurity: No Food Insecurity (9/5/2024)    Received from [facility]    Hunger Vital Sign     Worried About Running Out of Food in the Last Year: Never true     Ran Out of Food in the Last Year: Never true   Transportation Needs: No Transportation Needs (9/5/2024)    Received from [facility]    PRAPARE - Transportation     Lack of Transportation (Medical): No     Lack of Transportation (Non-Medical): No   Intimate Partner Violence: Not At Risk (9/26/2024)    Humiliation, Afraid, Rape, and Kick questionnaire     Fear of Current or Ex-Partner: No     Emotionally Abused: No     Physically Abused: No     Sexually Abused: No   Housing Stability: Low Risk  (9/5/2024)    Received from [facility]    Housing Stability Vital Sign     Unable to Pay for Housing in the Last Year: No     Number of Times Moved in the Last Year: 0     Homeless in the Last Year: No     No past surgical history on file.    PSYCHIATRIC EXAMINATION   There were no vitals taken for this visit.  Musculoskeletal: No abnormal movements noted, sitting comfortably on couch in video call  Appearance: well-developed, appears stated age, and fair hygiene, cooperative and pleasant  Thought Process:  linear while also  "perseverative  Abnormal or Psychotic Thoughts: paranoid delusions persist, pt reports anxiety continues to prevent him from going out  Speech: coherent and some slurred words, at times talkative.  Mood: \"depressed\"  Affect: dysthymic, blunted, and congruent with mood  SI/HI: Patient reports SI, states caught between not wanting to die but equally not wanting to live (like this, ie depressed)  Orientation: alert and oriented  Recent and Remote Memory: no gross impairment in immediate, recent, or remote memory  Attention Span and Concentration: grossly intact  Insight/Judgement into symptoms: fair  Neurological Testing (MSSE Score and/or clock drawing): MMSE not performed during this encounter    SCREENINGS:      9/26/2024     9:26 PM 9/29/2024     8:10 PM   Depression Screen (PHQ-2/PHQ-9)   PHQ-2 Total Score 5 5   PHQ-9 Total Score 15 18          PREVENTATIVE CARE  Medication Monitoring:   .[id number][6W    Recent Results (from the past 2 weeks)   LITHIUM    Collection Time: 11/25/24  2:34 PM   Result Value Ref Range    Lithium 0.6 0.6 - 1.2 mmol/L   CBC WITH DIFFERENTIAL    Collection Time: 11/25/24  2:34 PM   Result Value Ref Range    WBC 7.6 4.8 - 10.8 K/uL    RBC 4.92 4.70 - 6.10 M/uL    Hemoglobin 13.8 (L) 14.0 - 18.0 g/dL    Hematocrit 42.9 42.0 - 52.0 %    MCV 87.2 81.4 - 97.8 fL    MCH 28.0 27.0 - 33.0 pg    MCHC 32.2 (L) 32.3 - 36.5 g/dL    RDW 42.9 35.9 - 50.0 fL    Platelet Count 275 164 - 446 K/uL    MPV 10.9 9.0 - 12.9 fL    Neutrophils-Polys 51.70 44.00 - 72.00 %    Lymphocytes 35.50 22.00 - 41.00 %    Monocytes 8.20 0.00 - 13.40 %    Eosinophils 3.20 0.00 - 6.90 %    Basophils 0.70 0.00 - 1.80 %    Immature Granulocytes 0.70 0.00 - 0.90 %    Nucleated RBC 0.00 0.00 - 0.20 /100 WBC    Neutrophils (Absolute) 3.93 1.82 - 7.42 K/uL    Lymphs (Absolute) 2.69 1.00 - 4.80 K/uL    Monos (Absolute) 0.62 0.00 - 0.85 K/uL    Eos (Absolute) 0.24 0.00 - 0.51 K/uL    Baso (Absolute) 0.05 0.00 - 0.12 K/uL    " Immature Granulocytes (abs) 0.05 0.00 - 0.11 K/uL    NRBC (Absolute) 0.00 K/uL   Comp Metabolic Panel    Collection Time: 11/25/24  2:34 PM   Result Value Ref Range    Sodium 143 135 - 145 mmol/L    Potassium 3.7 3.6 - 5.5 mmol/L    Chloride 107 96 - 112 mmol/L    Co2 23 20 - 33 mmol/L    Anion Gap 13.0 7.0 - 16.0    Glucose 91 65 - 99 mg/dL    Bun 16 8 - 22 mg/dL    Creatinine 1.05 0.50 - 1.40 mg/dL    Calcium 9.4 8.5 - 10.5 mg/dL    Correct Calcium 9.2 8.5 - 10.5 mg/dL    AST(SGOT) 14 12 - 45 U/L    ALT(SGPT) 9 2 - 50 U/L    Alkaline Phosphatase 40 30 - 99 U/L    Total Bilirubin 0.2 0.1 - 1.5 mg/dL    Albumin 4.3 3.2 - 4.9 g/dL    Total Protein 6.9 6.0 - 8.2 g/dL    Globulin 2.6 1.9 - 3.5 g/dL    A-G Ratio 1.7 g/dL   VITAMIN D,25 HYDROXY (DEFICIENCY)    Collection Time: 11/25/24  2:34 PM   Result Value Ref Range    25-Hydroxy   Vitamin D 25 36 30 - 100 ng/mL   VITAMIN B12    Collection Time: 11/25/24  2:34 PM   Result Value Ref Range    Vitamin B12 -True Cobalamin 547 211 - 911 pg/mL   VITAMIN B1    Collection Time: 11/25/24  2:34 PM   Result Value Ref Range    Vitamin B1 178 70 - 180 nmol/L   TSH    Collection Time: 11/25/24  2:34 PM   Result Value Ref Range    TSH 3.570 0.350 - 5.500 uIU/mL   FREE THYROXINE    Collection Time: 11/25/24  2:34 PM   Result Value Ref Range    Free T-4 1.05 0.93 - 1.70 ng/dL   ESTIMATED GFR    Collection Time: 11/25/24  2:34 PM   Result Value Ref Range    GFR (CKD-EPI) 93 >60 mL/min/1.73 m 2       Vitals   Discussed side effects including headache, drowsiness, dizziness, sedation, dry mouth, constipation, weight gain, orthostatic hypotension, hypertension, dyslipidemia, hyperglycemia, diabetes mellitus, akathisia/restlessness, tremors, muscle rigidity, acute dystonia, tardive dyskinesia etc.     [place]  records   reviewed.  No concerns about misuse of controlled substance.    CURRENT RISK ASSESSMENT       Suicide: Moderate       Homicide: Low       Self-Harm: Low       Relapse:  Low       Crisis Safety Plan Reviewed Yes    ASSESSMENT/DIAGNOSES/PLAN  Problem List Items Addressed This Visit          Psychiatry Problems    Bipolar affective disorder, currently depressed, moderate (HCC)     Problem type: Chronic with acute exacerbation, current episode depressive.     Assessment: Tolerating cross-taper from Latuda to Abilify. No significant improvement in sleep, pt has been taking the gabapentin PRN all at nighttime with no noticeable effect. Labs resulted demonstrate subtherapeutic lithium of 0.6, though it was drawn early afternoon, which is likely a few hours past the typical trough level obtained 12 hours after the prior night's dose. May consider titrating up after completing cross-taper. Will increase the gabapentin PRN in the meantime to target anxiety and sleep with the goal of minimizing benzodiazepine use unless sleep is becoming dysregulated, at which time the pt was instructed to urgently contact the office.     Continuing to monitor blood pressure through cross-taper; currently on losartan 25mg daily, could be reasonable to d/c if continuing to have orthostasis.     Plan  Medication:   - Decrease Latuda to 40mg daily  - Increase Abilify to 10mg daily  - Increase gabapentin to 300mg BID PRN for anxiety and sleep  - Continue Klonopin 0.5mg daily PRN for anxiety/sleep, can break in half to minimize use (7 pills dispensed last visit, no refills since)  - Continue lithium 900mg qHS     Therapy: Discussed pt can enroll in [facility] psychotherapy clinic for weekly therapy; pt to discuss next week, considering.         Relevant Medications    ARIPiprazole (ABILIFY) 10 MG Tab    lurasidone (LATUDA) 40 MG Tab    Gabapentin, Once-Daily, 300 MG Tab    Affective psychosis, bipolar (HCC)    Relevant Medications    ARIPiprazole (ABILIFY) 10 MG Tab    lurasidone (LATUDA) 40 MG Tab    Gabapentin, Once-Daily, 300 MG Tab          Medication options, alternatives (including no medications) and medication  risks/benefits/side effects were discussed in detail.  The patient was advised to call, message clinician on [facility], or come in to the clinic if symptoms worsen or if questions/issues regarding their medications arise.  The patient verbalized understanding and agreement.    The patient was educated to call 911, call the suicide hotline, or go to the local ER if having thoughts of suicide or homicide.  The patient verbalized understanding and agreement.   The proposed treatment plan was discussed with the patient who was provided the opportunity to ask questions and make suggestions regarding alternative treatment. Patient verbalized understanding and expressed agreement with the plan.      Follow-up in 1 week virtually.    This appointment was supervised by attending psychiatrist, Francisco Javier Duff M.D., who agrees with assessment and treatment plan.  See attending attestation for more details.

## 2024-12-09 ENCOUNTER — TELEMEDICINE (OUTPATIENT)
Dept: BEHAVIORAL HEALTH | Facility: PSYCHIATRIC FACILITY | Age: 39
End: 2024-12-09
Payer: MEDICAID

## 2024-12-09 DIAGNOSIS — F99 INSOMNIA DUE TO OTHER MENTAL DISORDER: ICD-10-CM

## 2024-12-09 DIAGNOSIS — F31.5 BIPOLAR DISORDER, CURRENT EPISODE DEPRESSED, SEVERE, WITH PSYCHOTIC FEATURES (HCC): ICD-10-CM

## 2024-12-09 DIAGNOSIS — F51.05 INSOMNIA DUE TO OTHER MENTAL DISORDER: ICD-10-CM

## 2024-12-09 DIAGNOSIS — F31.32 BIPOLAR AFFECTIVE DISORDER, CURRENTLY DEPRESSED, MODERATE (HCC): ICD-10-CM

## 2024-12-09 PROBLEM — Z79.899 HIGH RISK MEDICATION USE: Status: ACTIVE | Noted: 2024-12-09

## 2024-12-09 PROBLEM — F31.9 AFFECTIVE PSYCHOSIS, BIPOLAR (HCC): Status: ACTIVE | Noted: 2024-12-09

## 2024-12-09 PROCEDURE — 99214 OFFICE O/P EST MOD 30 MIN: CPT | Mod: GT

## 2024-12-09 RX ORDER — ARIPIPRAZOLE 15 MG/1
15 TABLET ORAL DAILY
Qty: 10 TABLET | Refills: 0 | Status: SHIPPED | OUTPATIENT
Start: 2024-12-09 | End: 2024-12-16 | Stop reason: DRUGHIGH

## 2024-12-09 RX ORDER — LURASIDONE HYDROCHLORIDE 20 MG/1
20 TABLET, FILM COATED ORAL
Qty: 10 TABLET | Refills: 0 | Status: SHIPPED | OUTPATIENT
Start: 2024-12-09 | End: 2024-12-16

## 2024-12-09 ASSESSMENT — ENCOUNTER SYMPTOMS
NERVOUS/ANXIOUS: 1
NERVOUS/ANXIOUS: 1
DEPRESSION: 1
DIZZINESS: 1
INSOMNIA: 1
NERVOUS/ANXIOUS: 1
DEPRESSION: 1
DEPRESSION: 1

## 2024-12-09 NOTE — ASSESSMENT & PLAN NOTE
Problem type: Chronic with acute exacerbation, current episode depressive.     Assessment: Tolerating cross-taper from Latuda to Abilify. No significant improvement in sleep, pt has been taking the gabapentin PRN all at nighttime with no noticeable effect. Labs resulted demonstrate subtherapeutic lithium of 0.6, though it was drawn early afternoon, which is likely a few hours past the typical trough level obtained 12 hours after the prior night's dose. May consider titrating up after completing cross-taper. Will increase the gabapentin PRN in the meantime to target anxiety and sleep with the goal of minimizing benzodiazepine use unless sleep is becoming dysregulated, at which time the pt was instructed to urgently contact the office.     Continuing to monitor blood pressure through cross-taper; currently on losartan 25mg daily, could be reasonable to d/c if continuing to have orthostasis.     Plan  Medication:   - Decrease Latuda to 40mg daily  - Increase Abilify to 10mg daily  - Increase gabapentin to 300mg BID PRN for anxiety and sleep  - Continue Klonopin 0.5mg daily PRN for anxiety/sleep, can break in half to minimize use (7 pills dispensed last visit, no refills since)  - Continue lithium 900mg qHS     Therapy: Discussed pt can enroll in [facility] psychotherapy clinic for weekly therapy; pt to discuss next week, considering.

## 2024-12-09 NOTE — ASSESSMENT & PLAN NOTE
Problem type: Chronic with acute exacerbation, current episode depressive.    Assessment: Pt no longer experiencing symptoms concerning for yamila. More concerning for persisting anxious/paranoid thoughts that are difficult to control and distressing to the pt. He has been isolative to his parents' home with ongoing poor sleep, decreased interest in activities, feelings of hopelessness, decreased energy/motivation/concentration, and passive suicidal ideation. Reports Latuda has not been effective as adjunct to lithium. Does not appear it has been effectively mitigating depressive and psychotic (delusions/paranoia) symptoms. Therefore discussed with the pt, will cross-taper to Abilify with frequent follow-ups to monitor SI and mood. Additionally discussed pt's significant anxiety and difficulty with maintaining sleep since tapering off of Ativan. Given the tenuous nature of his presentation, as he is still well within a critical period following a manic episode, discussed PRN Klonopin if sleep becomes dysregulated. Discussed the importance of optimizing other sleep factors prior to resorting to Klonopin. Will also provide PRN gabapentin for anxiety, which can be dosed through the day or all at nighttime.     While cross-tapering, will monitor blood pressure; currently on losartan 25mg daily, could be reasonable to d/c if continuing to have orthostasis.    Plan  Medication:   - Decrease Latuda to 60mg daily  - Start Abilify 5mg daily  - Start gabapentin 100mg TID PRN for anxiety, can take all at nighttime if needed  - Start Klonopin 0.5mg daily PRN for anxiety/sleep, can break in half to minimize use  - Continue lithium 900mg qHS    Therapy: Discussed pt can enroll in [facility] psychotherapy clinic for weekly therapy; recommend given pt's impairment.    Other Orders:  - Lithium level  - TSH  - CBC  - CMP  - Vitamin D  - B12

## 2024-12-09 NOTE — PROGRESS NOTES
"[facility] Outpatient Psychiatric Follow Up Note  Evaluation completed by: Elias Villa M.D.   Date of Service: 12/09/2024  Appointment type: virtual/telepsychiatry appointment: This evaluation was conducted via Zoom using secure and encrypted videoconferencing technology. The patient was in their home in the [facility].   The patient's identity was confirmed and verbal consent was obtained for this virtual visit.  Attending:  Dr. Sorin Busby MD  Information below was collected from: patient, patient's mother, and patient's father    CHIEF COMPLIANT:  F/u for management of bipolar d/o, type 1.  HPI:   Lee Yadav is a 39 y.o. old male who presents today for regularly scheduled follow up for assessment of BD1.    Pt was accompanied by parents in video call.     Reports sleep has continued to be somewhat of a struggle in the last week, sleeps around 9PM, wakes up around 9AM. Unfortunately has been broken,waking up 3-4 times and stays awake up to an hour each time. Does not feel very well-rested. Continues to struggle with \"happiness and lynn\" but has noticed he's able to engage in conversations more readily as positive. Since last week, still struggling with getting out in public and establishing a routine. His parents report he's indeed been more conversational and more himself. He's made phone calls with his brother and has done some chores. Says he's \"still obsessing\" about getting hospital records from [facility] to determine status of legal concerns pt previously mentioned which concerned him. His mother reports she feels while there are improvements, she feels he's still stuck on negative thoughts, perhaps because he's in a depressed mindset and is maybe \"finding negative things to think about.\" Lee does still feel his mind cannot control these thoughts and feels unsettled, such that he needs to \"find the answer\" to his concerns. He feels it's not because he's depressed that he's thinking about " "these things: rather, he feels those thoughts themselves are still sticking around. He fortunately reports a decrease in intensity of suicidal thoughts, but he does continue to ruminate on his previous level of mental functioning before his manic episode. He wonders if this state of mind with continue and whether he'll be able to return to a \"normal life...a life I want to live.\"     Pt's father did bring up that the pt had mentioned during his hospitalization at [facility] that there were staff members, \"Maori staff members,\" who told the pt that \"he was dead.\" This was a new detail with unclear line of thought leading to this conclusion.     Regarding sleep treatment, pt reports he had tried trazodone in the past up to 100mg qHS back in his 20s. He reports it was helpful maybe for a period but eventually wore off. Likewise when he tried Ambien in the past. He does report good effect when taking the Klonopin 0.5mg PO PRN, as though it \"gives my mind a break.\"     PSYCHIATRIC REVIEW OF SYSTEMS:current symptoms as reported by pt.  Depression: Depressed mood, Anhedonia, Sense of hopelessness, Low energy, Difficulty concentrating, and Passive thoughts of death  Carina:  No recurrence of euphoria or excessive irritability in the context of severely disrupted sleep and goal-directed/reckless activity. Sleep maintenance still somewhat problematic with frequent wakening and difficult returning to sleep.  Anxiety/Panic Attacks: insomnia, racing thoughts, feelings of losing control, difficulty concentrating; isolating at home d/t anxiety, does not want to go out in public. Obsessive thoughts related to prior hospitalization causing pt significant distress, compulsion of needing to check hospital records to verify his suspicions despite others saying the contrary.  Trauma: Patient reports no signs or symptoms indicative of PTSD  Psychosis:  Distressing, anxious/paranoid thoughts that he continues to have some lingering legal " matters from his time in the hospital; exacerbated when out in public, though does not report intensity of believing people are talking about him is decreased.  ADHD: No reported distressing hyperactive or inattentive symptoms aside from the above.    CURRENT MEDICATIONS    Current Outpatient Medications:     clonazePAM (KLONOPIN) 0.5 MG Tab, Take 1 Tablet by mouth 1 time a day as needed (To use in exception conditions to regulate sleep and anxiety associated with BD1 while traveling over the holidays.) for up to 7 days., Disp: 7 Tablet, Rfl: 0    aripiprazole (ABILIFY) 20 MG tablet, Take 1 Tablet by mouth every day for 90 days., Disp: 30 Tablet, Rfl: 2    gemfibrozil (LOPID) 600 MG Tab, Take 1 Tablet by mouth 2 times a day., Disp: 60 Tablet, Rfl: 2    acetaminophen (TYLENOL) 500 MG Tab, Take 2 Tablets by mouth every 6 hours as needed for Mild Pain or Fever., Disp: , Rfl:     melatonin 5 mg Tab, Take 1 Tablet by mouth at bedtime as needed (insomnia)., Disp: , Rfl:     levothyroxine (SYNTHROID) 112 MCG Tab, Take 112 mcg by mouth every morning on an empty stomach., Disp: , Rfl:     lithium 600 MG capsule, Take 900 mg by mouth every day., Disp: , Rfl:     losartan (COZAAR) 25 MG Tab, Take 25 mg by mouth every day. Hold if BP <100/60, Disp: , Rfl:     Ixekizumab (TALTZ) 80 MG/ML Solution Prefilled Syringe, Inject 80 mg under the skin every 28 days., Disp: , Rfl:      REVIEW OF SYSTEMS   Review of Systems   Psychiatric/Behavioral:  Positive for depression. The patient is nervous/anxious.      Neurologic: no tics, tremors, dyskinesias. The patient denies dizzniess, syncope, falls. Ambulates independently    PAST MEDICAL HISTORY  Past Medical History:   Diagnosis Date    Bipolar disorder (HCC)      No Known Allergies  No past surgical history on file.   No family history on file.  Social History     Socioeconomic History    Marital status: Single   Tobacco Use    Smoking status: Never    Smokeless tobacco: Never   Vaping  "Use    Vaping status: Never Used   Substance and Sexual Activity    Alcohol use: Not Currently    Drug use: Yes     Types: Inhaled     Comment: xavier     Social Drivers of Health     Financial Resource Strain: High Risk (9/5/2024)    Received from [facility]    Overall Financial Resource Strain (CARDIA)     Difficulty of Paying Living Expenses: Hard   Food Insecurity: No Food Insecurity (9/5/2024)    Received from [facility]    Hunger Vital Sign     Worried About Running Out of Food in the Last Year: Never true     Ran Out of Food in the Last Year: Never true   Transportation Needs: No Transportation Needs (9/5/2024)    Received from [facility]    PRAPARE - Transportation     Lack of Transportation (Medical): No     Lack of Transportation (Non-Medical): No   Intimate Partner Violence: Not At Risk (9/26/2024)    Humiliation, Afraid, Rape, and Kick questionnaire     Fear of Current or Ex-Partner: No     Emotionally Abused: No     Physically Abused: No     Sexually Abused: No   Housing Stability: Low Risk  (9/5/2024)    Received from [facility]    Housing Stability Vital Sign     Unable to Pay for Housing in the Last Year: No     Number of Times Moved in the Last Year: 0     Homeless in the Last Year: No     No past surgical history on file.    PSYCHIATRIC EXAMINATION   There were no vitals taken for this visit.  Musculoskeletal: No abnormal movements noted, sitting comfortably on couch in video call  Appearance: well-developed, appears stated age, and fair hygiene, cooperative and pleasant  Thought Process:  linear while also perseverative on prior hospitalizations  Abnormal or Psychotic Thoughts: paranoid delusions persist, pt reports anxiety continues to prevent him from going out  Speech: coherent and some slurred words, at times talkative.  Mood: \"Better.\"  Affect: Mildly anxious, blunted, and congruent with mood  SI/HI: Patient reports decreased intensity of " passive SI, not crossing mind as frequently and with decreased intensity.  Orientation: alert and oriented  Recent and Remote Memory: no gross impairment in immediate, recent, or remote memory  Attention Span and Concentration: grossly intact  Insight/Judgement into symptoms: impaired/fair, as evidenced by pt's fixated beliefs that hospitalizations may lead to legal repercussions despite evidence in the contrary and others' perspectives  Neurological Testing (MSSE Score and/or clock drawing): MMSE not performed during this encounter    SCREENINGS:      9/26/2024     9:26 PM 9/29/2024     8:10 PM   Depression Screen (PHQ-2/PHQ-9)   PHQ-2 Total Score 5 5   PHQ-9 Total Score 15 18        PREVENTATIVE CARE  Medication Monitoring:     No results found for this or any previous visit (from the past 2 weeks).      Vitals   Discussed side effects including headache, drowsiness, dizziness, sedation, dry mouth, constipation, weight gain, orthostatic hypotension, hypertension, dyslipidemia, hyperglycemia, diabetes mellitus, akathisia/restlessness, tremors, muscle rigidity, acute dystonia, tardive dyskinesia etc.     [place]  records   reviewed.  No concerns about misuse of controlled substance.    CURRENT RISK ASSESSMENT       Suicide: Moderate       Homicide: Low       Self-Harm: Low       Relapse: Low       Crisis Safety Plan Reviewed Yes    ASSESSMENT/DIAGNOSES/PLAN  Pt is a 40yo male with history of BD1 presenting for short-interval follow-up for maintenance of BD1 post-hospitalization, cross-tapering Abilify for Latuda targeting anxiety/paranoid symptoms bordering delusional thoughts, anxiety, and depressive symptoms. Pt has shown improvement in depressive symptoms, now reported more engaged with parents and reporting decreased intensity of SI. Paranoid thoughts related to hospitalizations persist, appear persecutory delusions given the fixed nature of these thoughts despite evidence in the contrary. Sleep remains  problematic symptoms, additionally. Pt is doing well to set sleep routine with designated bedtimes and waking time. Anxious thoughts related to hospitalization are exacerbating difficulty with sleep. Pt would benefit from psychotherapy targeting sleep, CBT-I. In the meantime, provided material on sleep hygiene.     Problem List Items Addressed This Visit          Psychiatry Problems    Insomnia due to other mental disorder     Per plan for BD1.         Bipolar affective disorder, currently depressed, moderate (HCC)     Problem type: Chronic with acute exacerbation, current episode depressive.     Assessment: Continuing to tolerate cross-taper Latuda to Abilify. Gabapentin minimally effective for PRN use of anxiety and sleep difficulty. Pt has trialed trazodone in past with short period of good effect in 20s, though he remains hesitant to try this visit. Will reserve for consideration down the road. Klonopin PRN continues to be available in exceptional situations given sleep regulation is critical in maintenance of BD1, particularly in this period following inpatient hospitalization.     Plan  Medication:   - Decrease Latuda to 20mg daily  - Increase Abilify to 15mg daily, maintenance adjunct for BD1 and targeting obsessive/anxious thoughts.  - Continue gabapentin 300mg BID PRN, will explore next visit use of this medication  - Continue Klonopin 0.5mg once daily PRN for anxiety/sleep, can break in half to minimize use  - Continue lithium 900mg qHS for maintenance.     Therapy: Discussed pt can enroll in [facility] psychotherapy clinic for weekly therapy; recommend once back from holidays to target anxious/obsessive thoughts.            Affective psychosis, bipolar (HCC)       Medication options, alternatives (including no medications) and medication risks/benefits/side effects were discussed in detail.  The patient was advised to call, message clinician on MyChart, or come in to the clinic if symptoms worsen or if  questions/issues regarding their medications arise.  The patient verbalized understanding and agreement.    The patient was educated to call 911, call the suicide hotline, or go to the local ER if having thoughts of suicide or homicide.  The patient verbalized understanding and agreement.   The proposed treatment plan was discussed with the patient who was provided the opportunity to ask questions and make suggestions regarding alternative treatment. Patient verbalized understanding and expressed agreement with the plan.      Follow-up in 1 week in-person    This appointment was supervised by attending psychiatrist, Dr. Busby, who agrees with assessment and treatment plan.  See attending attestation for more details.

## 2024-12-16 ENCOUNTER — OFFICE VISIT (OUTPATIENT)
Dept: BEHAVIORAL HEALTH | Facility: PSYCHIATRIC FACILITY | Age: 39
End: 2024-12-16
Payer: MEDICAID

## 2024-12-16 VITALS
BODY MASS INDEX: 28.62 KG/M2 | SYSTOLIC BLOOD PRESSURE: 116 MMHG | HEART RATE: 93 BPM | HEIGHT: 76 IN | WEIGHT: 235 LBS | DIASTOLIC BLOOD PRESSURE: 74 MMHG | OXYGEN SATURATION: 94 %

## 2024-12-16 DIAGNOSIS — F31.5 BIPOLAR DISORDER, CURRENT EPISODE DEPRESSED, SEVERE, WITH PSYCHOTIC FEATURES (HCC): ICD-10-CM

## 2024-12-16 DIAGNOSIS — F31.32 BIPOLAR AFFECTIVE DISORDER, CURRENTLY DEPRESSED, MODERATE (HCC): ICD-10-CM

## 2024-12-16 DIAGNOSIS — F51.05 INSOMNIA DUE TO OTHER MENTAL DISORDER: ICD-10-CM

## 2024-12-16 DIAGNOSIS — F42.8 OTHER OBSESSIVE-COMPULSIVE DISORDERS: ICD-10-CM

## 2024-12-16 DIAGNOSIS — F99 INSOMNIA DUE TO OTHER MENTAL DISORDER: ICD-10-CM

## 2024-12-16 PROCEDURE — 3078F DIAST BP <80 MM HG: CPT

## 2024-12-16 PROCEDURE — 99214 OFFICE O/P EST MOD 30 MIN: CPT

## 2024-12-16 PROCEDURE — 3074F SYST BP LT 130 MM HG: CPT

## 2024-12-16 RX ORDER — ARIPIPRAZOLE 20 MG/1
20 TABLET ORAL DAILY
Qty: 30 TABLET | Refills: 2 | Status: SHIPPED | OUTPATIENT
Start: 2024-12-16 | End: 2025-03-16

## 2024-12-16 RX ORDER — CLONAZEPAM 0.5 MG/1
0.5 TABLET ORAL
Qty: 7 TABLET | Refills: 0 | Status: SHIPPED | OUTPATIENT
Start: 2024-12-16 | End: 2024-12-23

## 2024-12-16 ASSESSMENT — FIBROSIS 4 INDEX: FIB4 SCORE: 0.63

## 2024-12-16 NOTE — PROGRESS NOTES
"[facility] Outpatient Psychiatric Follow Up Note  Evaluation completed by: Elias Villa M.D.   Date of Service: 12/16/2024  Appointment type: in-office appointment.  Attending:  Sorin Busby M.D.   Information below was collected from: patient, patient's mother, and patient's father    CHIEF COMPLIANT:  BD1 maintenance.     HPI:   Lee Yadav is a 39 y.o. old male who presents today for regularly scheduled follow up for assessment of BD1.     Reports since last week, was feeling better after the appointment. However in the last few days, feels he's been having more negative thoughts, especially at nighttime, particularly thinking about how he feels stuck at this stage in life and can't see a way forward. 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 Still thinking a lot about hospitalizations at [facility] and [facility], as well. Finds that these thoughts are exceptionally distressing. He did call [facility] with his mother, and the hospital confirmed there were no concerns for legal ramifications, though he remains unconvinced. He continues to struggle being in public, says there's an increase in anxiety, particularly with crowds. He ended up taking a Klonopin when out in public and was able to tolerate being in public, had a buffet with his parents at the [facility]. Without the Klonopin, he tends to want to leave these situations, feeling restless. He denies feeling like people are talking about him or referencing him in public at the [facility], but does still feel sometimes being in [place] that there are \"rumors\" about his hospitalization. However if he were to go somewhere \"random\" where he hadn't been, he wouldn't be as paranoid. Father notes improvements in phone call conversations, says the pt is engaged in prior interests (college football). Also cleared the family room to get weights set up for exercise, yet to " "use. Regarding sleep, does continue to have difficulty with maintaining, wakes up in middle of night and has lots of thoughts, making it difficult to return to sleep. Melatonin helps with getting restful sleep, but does not help with initiation and maintenance. Still reporting around 6-8 hours, broken, over approximately 12 hours of sleep.     Pt did discuss use of marijuana after stabilization of BD1. Discussed the risks and potential benefits, including inducing more anxiety and possible substance-induced psychosis and yamila. Still taking gabapentin 300mg BID PRN, however has been taking routinely either 300mg BID or 600mg at nighttime. Does not note significant improvement in anxiety symptoms, predominantly.    Regarding sleep management, does report h/o using trazodone and Ambien, both losing efficacy in short periods. Klonopin was also used in the past for brief period with good effect, and pt self discontinued once not needed.     Reiterated circumstances leading up to this most recent manic episode. 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 However with starting that coaching position, he notes a increase in anxiety, decreased sleep (about 2 hours per night), elevated energy, writing a bunch of things he needs to do on pieces of paper, as though he's \"holding onto reality... 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 This all happened while he was in [place].     Today pt reports he has stopped taking his losartan since he has noticed his BP has been on the lower side since coming out of the hospital. He does continue to have moments of feeling unsteady requiring him to " collect himself while walking, but does not report GLF in interim.    PSYCHIATRIC REVIEW OF SYSTEMS:current symptoms as reported by pt.  Depression: Difficulty sleeping, mood improving, appetite robust and unchanged. SI decreased intensity, no intention.   Carina: No recurrence of symptoms concerning for carina, mood stable.  Anxiety/Panic Attacks: racing thoughts, feelings of losing control, difficulty concentrating, still fixed on anxious thoughts related to hospitalizations and recalls anxiety related to prior work place in [facility] and CPS reported (per parents, this report was not related to the pt).  Trauma: Patient reports no signs or symptoms indicative of PTSD  Psychosis: Persecutory delusions related to hospitalization and worrying about legal ramifications despite no concrete evidence in support of this belief.  OCD: Obsessive thought process related to hospitalization, distressing, despite best efforts to ignore or distract self.    CURRENT MEDICATIONS    Current Outpatient Medications:     clonazePAM (KLONOPIN) 0.5 MG Tab, Take 1 Tablet by mouth 1 time a day as needed (To use in exception conditions to regulate sleep and anxiety associated with BD1 while traveling over the holidays.) for up to 7 days., Disp: 7 Tablet, Rfl: 0    aripiprazole (ABILIFY) 20 MG tablet, Take 1 Tablet by mouth every day for 90 days., Disp: 30 Tablet, Rfl: 2    gemfibrozil (LOPID) 600 MG Tab, Take 1 Tablet by mouth 2 times a day., Disp: 60 Tablet, Rfl: 2    acetaminophen (TYLENOL) 500 MG Tab, Take 2 Tablets by mouth every 6 hours as needed for Mild Pain or Fever., Disp: , Rfl:     melatonin 5 mg Tab, Take 1 Tablet by mouth at bedtime as needed (insomnia)., Disp: , Rfl:     levothyroxine (SYNTHROID) 112 MCG Tab, Take 112 mcg by mouth every morning on an empty stomach., Disp: , Rfl:     lithium 600 MG capsule, Take 900 mg by mouth every day., Disp: , Rfl:     losartan (COZAAR) 25 MG Tab, Take 25 mg by mouth every day. Hold  if BP <100/60, Disp: , Rfl:     Ixekizumab (TALTZ) 80 MG/ML Solution Prefilled Syringe, Inject 80 mg under the skin every 28 days., Disp: , Rfl:      REVIEW OF SYSTEMS   Review of Systems   Psychiatric/Behavioral:  Positive for depression. Negative for hallucinations. The patient is nervous/anxious.      Neurologic: no tics, tremors, dyskinesias. The patient denies dizziness, syncope, falls. Ambulates independently    PAST MEDICAL HISTORY  Past Medical History:   Diagnosis Date    Bipolar disorder (HCC)      No Known Allergies  No past surgical history on file.   No family history on file.  Social History     Socioeconomic History    Marital status: Single   Tobacco Use    Smoking status: Never    Smokeless tobacco: Never   Vaping Use    Vaping status: Never Used   Substance and Sexual Activity    Alcohol use: Not Currently    Drug use: Yes     Types: Inhaled     Comment: xavier     Social Drivers of Health     Financial Resource Strain: High Risk (9/5/2024)    Received from [facility]    Overall Financial Resource Strain (CARDIA)     Difficulty of Paying Living Expenses: Hard   Food Insecurity: No Food Insecurity (9/5/2024)    Received from [facility]    Hunger Vital Sign     Worried About Running Out of Food in the Last Year: Never true     Ran Out of Food in the Last Year: Never true   Transportation Needs: No Transportation Needs (9/5/2024)    [facility]    PRAPARE - Transportation     Lack of Transportation (Medical): No     Lack of Transportation (Non-Medical): No   Intimate Partner Violence: Not At Risk (9/26/2024)    Humiliation, Afraid, Rape, and Kick questionnaire     Fear of Current or Ex-Partner: No     Emotionally Abused: No     Physically Abused: No     Sexually Abused: No   Housing Stability: Low Risk  (9/5/2024)    Received from [facility]    Housing Stability Vital Sign     Unable to Pay for Housing in the Last Year: No     Number  "of Times Moved in the Last Year: 0     Homeless in the Last Year: No     No past surgical history on file.    PSYCHIATRIC EXAMINATION   /74 (BP Location: Left arm, Patient Position: Sitting, BP Cuff Size: Large adult)   Pulse 93   Ht 1.93 m (6' 4\")   Wt 107 kg (235 lb)   SpO2 94%   BMI 28.61 kg/m²   Musculoskeletal: No abnormal movements noted  Appearance: well-developed, fair hygiene, and unkempt, cooperative and engaged  Thought Process:  perseverative  Abnormal or Psychotic Thoughts: paranoid delusions, persecutory delusions, and obsessions, compulsions  Speech: regular rate, rhythm, volume, tone, and syntax and talkative  Mood: \"ok\"  Affect: euthymic, blunted, and congruent with mood  SI/HI: Patient reports SI passive and diminishing intensity  Orientation: alert and oriented  Recent and Remote Memory: no gross impairment in immediate, recent, or remote memory  Attention Span and Concentration:  Insight/Judgement into symptoms: impaired/fair as evidenced by pt's perseverative and fixed beliefs related to hospitalization despite contrary evidence  Neurological Testing (MSSE Score and/or clock drawing): MMSE not performed during this encounter    SCREENINGS:      9/26/2024     9:26 PM 9/29/2024     8:10 PM   Depression Screen (PHQ-2/PHQ-9)   PHQ-2 Total Score 5 5   PHQ-9 Total Score 15 18          PREVENTATIVE CARE  Medication Monitoring: Mood Stabilizers: Lithium level reviewed. Reviewed NSAIDs, toxicity, hydration, and pregnancy.  Benzodiazepines: Signed Controlled Substance Agreement. Educated patient on: abuse, tolerance and withdrawal, risk of falls, avoiding combining with alcohol and opioids, and medication exit plan.    Vitals Encounter Vitals  Blood Pressure: 116/74  Pulse: 93  Pulse Oximetry: 94 %  Weight: 107 kg (235 lb)  Height: 193 cm (6' 4\")  BMI (Calculated): 28.61   Discussed side effects including headache, drowsiness, dizziness, sedation, dry mouth, constipation, weight gain, " orthostatic hypotension, hypertension, dyslipidemia, hyperglycemia, diabetes mellitus, akathisia/restlessness, tremors, muscle rigidity, acute dystonia, tardive dyskinesia etc.     [place]  records   reviewed.  No concerns about misuse of controlled substance.    CURRENT RISK ASSESSMENT       Suicide: Low       Homicide: Low       Self-Harm: Low       Relapse: Low       Crisis Safety Plan Reviewed No    ASSESSMENT/DIAGNOSES/PLAN  Problem List Items Addressed This Visit          Psychiatry Problems    Insomnia due to other mental disorder     Per plan for BD1.         Bipolar affective disorder, currently depressed, moderate (HCC)     Problem type: Chronic with acute exacerbation, current episode depressive.     Assessment: Tolerating cross-taper from Latuda to Abilify. Sleep has been stable, 6-8 hours nightly with 12 hours in bed, still broken and wakes up regularly and difficulty returning to sleep. Most recent labs demonstrate subtherapeutic lithium of 0.6. Can consider increasing after being on Abilify 20mg, reassess and determine if further augmentation needed. Anxiety, particularly in social situations, continues to be largest distressing symptom. Pt has been taking Klonopin sparingly, to use in extenuating situations. Has been engaging in healthy behavioral activities. Discussed at length the risks/benefits of continued use of Klonopin during this period following recent inpatient hospitalization. Pt will be traveling with family to [place], is reasonable to have Klonopin PRN for exceptional circumstances but long-term plan remains same as prior. Will reassess after holidays and determine if other means to manage anxiety and sleep may benefit the pt. Trazodone was tolerated in the past with some effect for sleep, may also be used as PRN during daytime for anxiety. Will discuss at next appointment.     Continuing to monitor blood pressure through cross-taper; currently on losartan 25mg daily, could be  reasonable to d/c if continuing to have orthostasis.     Plan  Medication:   - Stop Latuda to 20mg daily, cross-taper completed.  - Increase Abilify to 20mg daily, maintenance adjunct for BD1 and targeting obsessive/anxious thoughts.  - Continue gabapentin 300mg BID PRN, will explore next visit use of this medication  - Continue Klonopin 0.5mg once daily PRN for anxiety/sleep, can break in half to minimize use (originally prescribed x7 0.5mg tablets on 11/25, still has 2 tablets left; provide additional 7 tablets over holiday travels PRN).  - Continue lithium 900mg qHS for maintenance.     Therapy: Discussed pt can enroll in [facility] psychotherapy clinic for weekly therapy; recommend once back from holidays to target anxious/obsessive thoughts.            Relevant Medications    clonazePAM (KLONOPIN) 0.5 MG Tab    aripiprazole (ABILIFY) 20 MG tablet    Affective psychosis, bipolar (HCC)    Relevant Medications    clonazePAM (KLONOPIN) 0.5 MG Tab    aripiprazole (ABILIFY) 20 MG tablet    Obsessive compulsive disorder     Problem type: New-onset, ongoing    Assessment:   Since being discharged from inpatient in 10/2024, and even during the tail-end of that hospitalization, the pt has demonstrated obsessive thought processes related to hospitalization and paranoia toward the hospital and others (pts, staff, random people in public) that has been significantly distressing to the pt and impairing, particularly affecting sleep. He has attempted to ignore or distract self from these thoughts with little success, often finding self thinking about these things prior to sleeping and has difficulty returning to sleep in middle of night d/t these thoughts. These thoughts appear nearly delusional in nature, as they are fixed and poorly-supported, yet are persistent despite contrary perspectives and attempts at obtaining hospital documentation demosntrating quite the contrary. At this time, pt is being cross-tapered per plan for BD1.  Will continue monitoring these obsessional, paranoid thoughts, as the Abilify may help ameliorate the severity of these thoughts with time, while also targeting his mood and sleep dysregulation.    Plan  Medication: per BD1 plan.    Therapy: Will revisit therapy once back from holidays, per BD1 plan.               Medication options, alternatives (including no medications) and medication risks/benefits/side effects were discussed in detail.  The patient was advised to call, message clinician on [facility], or come in to the clinic if symptoms worsen or if questions/issues regarding their medications arise.  The patient verbalized understanding and agreement.    The patient was educated to call 911, call the suicide hotline, or go to the local ER if having thoughts of suicide or homicide.  The patient verbalized understanding and agreement.   The proposed treatment plan was discussed with the patient who was provided the opportunity to ask questions and make suggestions regarding alternative treatment. Patient verbalized understanding and expressed agreement with the plan.      Follow-up in 2-4 weeks.      This appointment was supervised by attending psychiatrist, Sorin Busby M.D. , who agrees with assessment and treatment plan.  See attending attestation for more details.

## 2024-12-16 NOTE — ASSESSMENT & PLAN NOTE
Problem type: Chronic with acute exacerbation, current episode depressive.     Assessment: Tolerating cross-taper from Latuda to Abilify. Sleep has been stable, 6-8 hours nightly with 12 hours in bed, still broken and wakes up regularly and difficulty returning to sleep. Most recent labs demonstrate subtherapeutic lithium of 0.6. Can consider increasing after being on Abilify 20mg, reassess and determine if further augmentation needed. Anxiety, particularly in social situations, continues to be largest distressing symptom. Pt has been taking Klonopin sparingly, to use in extenuating situations. Has been engaging in healthy behavioral activities. Discussed at length the risks/benefits of continued use of Klonopin during this period following recent inpatient hospitalization. Pt will be traveling with family to [place], is reasonable to have Klonopin PRN for exceptional circumstances but long-term plan remains same as prior. Will reassess after holidays and determine if other means to manage anxiety and sleep may benefit the pt. Trazodone was tolerated in the past with some effect for sleep, may also be used as PRN during daytime for anxiety. Will discuss at next appointment.     Continuing to monitor blood pressure through cross-taper; currently on losartan 25mg daily, could be reasonable to d/c if continuing to have orthostasis.     Plan  Medication:   - Stop Latuda to 20mg daily, cross-taper completed.  - Increase Abilify to 20mg daily, maintenance adjunct for BD1 and targeting obsessive/anxious thoughts.  - Continue gabapentin 300mg BID PRN, will explore next visit use of this medication  - Continue Klonopin 0.5mg once daily PRN for anxiety/sleep, can break in half to minimize use (originally prescribed x7 0.5mg tablets on 11/25, still has 2 tablets left; provide additional 7 tablets over holiday travels PRN).  - Continue lithium 900mg qHS for maintenance.     Therapy: Discussed pt can enroll in [facility] psychotherapy  clinic for weekly therapy; recommend once back from holidays to target anxious/obsessive thoughts.

## 2024-12-20 PROBLEM — F42.9 OBSESSIVE COMPULSIVE DISORDER: Status: ACTIVE | Noted: 2024-12-20

## 2024-12-20 ASSESSMENT — ENCOUNTER SYMPTOMS
HALLUCINATIONS: 0
NERVOUS/ANXIOUS: 1
DEPRESSION: 1

## 2024-12-20 NOTE — ASSESSMENT & PLAN NOTE
Problem type: Chronic with acute exacerbation, current episode depressive.     Assessment: Continuing to tolerate cross-taper Latuda to Abilify. Gabapentin minimally effective for PRN use of anxiety and sleep difficulty. Pt has trialed trazodone in past with short period of good effect in 20s, though he remains hesitant to try this visit. Will reserve for consideration down the road. Klonopin PRN continues to be available in exceptional situations given sleep regulation is critical in maintenance of BD1, particularly in this period following inpatient hospitalization.     Plan  Medication:   - Decrease Latuda to 20mg daily  - Increase Abilify to 15mg daily, maintenance adjunct for BD1 and targeting obsessive/anxious thoughts.  - Continue gabapentin 300mg BID PRN, will explore next visit use of this medication  - Continue Klonopin 0.5mg once daily PRN for anxiety/sleep, can break in half to minimize use  - Continue lithium 900mg qHS for maintenance.     Therapy: Discussed pt can enroll in [facility] psychotherapy clinic for weekly therapy; recommend once back from holidays to target anxious/obsessive thoughts.

## 2024-12-21 NOTE — ASSESSMENT & PLAN NOTE
Problem type: New-onset, ongoing    Assessment:   Since being discharged from inpatient in 10/2024, and even during the tail-end of that hospitalization, the pt has demonstrated obsessive thought processes related to hospitalization and paranoia toward the hospital and others (pts, staff, random people in public) that has been significantly distressing to the pt and impairing, particularly affecting sleep. He has attempted to ignore or distract self from these thoughts with little success, often finding self thinking about these things prior to sleeping and has difficulty returning to sleep in middle of night d/t these thoughts. These thoughts appear nearly delusional in nature, as they are fixed and poorly-supported, yet are persistent despite contrary perspectives and attempts at obtaining hospital documentation demosntrating quite the contrary. At this time, pt is being cross-tapered per plan for BD1. Will continue monitoring these obsessional, paranoid thoughts, as the Abilify may help ameliorate the severity of these thoughts with time, while also targeting his mood and sleep dysregulation.    Plan  Medication: per BD1 plan.    Therapy: Will revisit therapy once back from holidays, per BD1 plan.

## 2025-01-13 ENCOUNTER — TELEMEDICINE (OUTPATIENT)
Dept: BEHAVIORAL HEALTH | Facility: PSYCHIATRIC FACILITY | Age: 40
End: 2025-01-13
Payer: MEDICAID

## 2025-01-13 DIAGNOSIS — F31.32 BIPOLAR AFFECTIVE DISORDER, CURRENTLY DEPRESSED, MODERATE (HCC): ICD-10-CM

## 2025-01-13 DIAGNOSIS — F31.5 BIPOLAR DISORDER, CURRENT EPISODE DEPRESSED, SEVERE, WITH PSYCHOTIC FEATURES (HCC): ICD-10-CM

## 2025-01-13 DIAGNOSIS — E03.9 HYPOTHYROIDISM, UNSPECIFIED TYPE: ICD-10-CM

## 2025-01-13 DIAGNOSIS — E78.5 HYPERLIPIDEMIA, UNSPECIFIED HYPERLIPIDEMIA TYPE: ICD-10-CM

## 2025-01-13 PROCEDURE — 99214 OFFICE O/P EST MOD 30 MIN: CPT | Mod: GT

## 2025-01-13 RX ORDER — LITHIUM CARBONATE 300 MG
900 TABLET ORAL
Qty: 180 TABLET | Refills: 3 | Status: SHIPPED | OUTPATIENT
Start: 2025-01-13 | End: 2025-01-24

## 2025-01-13 RX ORDER — GEMFIBROZIL 600 MG/1
600 TABLET, FILM COATED ORAL 2 TIMES DAILY
Qty: 90 TABLET | Refills: 3 | Status: SHIPPED | OUTPATIENT
Start: 2025-01-13

## 2025-01-13 RX ORDER — ARIPIPRAZOLE 20 MG/1
20 TABLET ORAL DAILY
Qty: 90 TABLET | Refills: 3 | Status: SHIPPED | OUTPATIENT
Start: 2025-01-13

## 2025-01-13 RX ORDER — GABAPENTIN 300 MG/1
300 CAPSULE ORAL 2 TIMES DAILY PRN
Qty: 120 CAPSULE | Refills: 3 | Status: SHIPPED | OUTPATIENT
Start: 2025-01-13

## 2025-01-13 RX ORDER — LEVOTHYROXINE SODIUM 112 UG/1
112 TABLET ORAL
Qty: 90 TABLET | Refills: 3 | Status: SHIPPED | OUTPATIENT
Start: 2025-01-13

## 2025-01-13 NOTE — PROGRESS NOTES
"[facility] Outpatient Psychiatric Follow Up Note  Evaluation completed by: Elias Villa M.D.   Date of Service: 01/13/2025  Appointment type: virtual/telepsychiatry appointment: This evaluation was conducted via Zoom using secure and encrypted videoconferencing technology. The patient was in their home in the [facility].   The patient's identity was confirmed and verbal consent was obtained for this virtual visit.  Attending:  Sorin Busby M.D.   Information below was collected from: patient, patient's mother, and patient's father    CHIEF COMPLIANT:  Follow-up BD-I    HPI:   Lee Yadav is a 39 y.o. old male who presents today for regularly scheduled follow up for assessment of BD-I.    Reports coming back from holidays and says holidays were \"alright,\" still suffering through some of the same mental struggles but maybe not as often. He still struggles somewhat with being in crowds but enjoyed being around his family. He was able to put aside his recent hospitalization to a degree. Feels he's still stuck in a rut but feels like his mood cycles between down/frustrated/worried and normal. Does maintain hope that things will \"get back to how they were.\" Family reported he was doing much better. Mother reported she was \"happily surprised\" how well the pt tolerated flying in the airport, that he did well with the multiple transfers and interacted well with extended family, it didn't seem they really noticed anything odd with his behavior. The pt feels because he was in a new area where \"people didn't know me,\" he felt more comfortable and less worried/paranoid/anxious. Father reports he's \"coming back to normal.\" He reported pt is increasingly talking more about \"normal\" things, talking about sports and other previously-held interests. Father does report pt hasn't quite gotten to the part of socializing outside/getting out in the town. Lee does agree with much of this, though does report " "underlying \"something's wrong\" even though on the surface he's improved, \"difficult putting it into words,\" still uncomfortable going out in public. He still avoids going out of home. He still feels \"places where I was going through this...I know this doesn't make sense, being in [place] more and feeling this weird feeling that people, like, know...maybe an insecurity about my bipolar...\". The pt does not feel these thoughts are going away, per se, but they are less frequent.  He now feels, however, that perhaps people who may have been talking about his hospitalization may have perhaps stopped talking about him, which reassures him. He doesn't necessarily want to get back to who he was, but more so that he wants to get back to being independent.     Sleep reports sleep \"good for the most part.\" Few nights in last month that he'd need to take leftover Ambien from prior prescriptions. However most nights does well without. Does have some difficulty with initiating and some nights maintaining sleep. Does continue to maintain scheduled sleep. Does continue to take gabapentin routinely.     PSYCHIATRIC REVIEW OF SYSTEMS:current symptoms as reported by pt.  Depression: Difficulty sleeping and Anhedonia  Carina: Patient denies any change in mood, increased energy, or marked irritability  Anxiety/Panic Attacks: insomnia, racing thoughts, feelings of losing control  Trauma: Patient reports no signs or symptoms indicative of PTSD  Psychosis: Patient reports no signs or symptoms indicative of psychosis    CURRENT MEDICATIONS    Current Outpatient Medications:     gabapentin (NEURONTIN) 300 MG Cap, Take 1 Capsule by mouth 2 times a day as needed (For anxiety and sleep.)., Disp: 120 Capsule, Rfl: 3    aripiprazole (ABILIFY) 20 MG tablet, Take 1 Tablet by mouth every day., Disp: 90 Tablet, Rfl: 3    gemfibrozil (LOPID) 600 MG Tab, Take 1 Tablet by mouth 2 times a day., Disp: 90 Tablet, Rfl: 3    levothyroxine (SYNTHROID) 112 MCG " Tab, Take 1 Tablet by mouth every morning on an empty stomach., Disp: 90 Tablet, Rfl: 3    lithium carbonate 300 MG capsule, Take 3 Capsules by mouth at bedtime., Disp: 270 Capsule, Rfl: 2    acetaminophen (TYLENOL) 500 MG Tab, Take 2 Tablets by mouth every 6 hours as needed for Mild Pain or Fever., Disp: , Rfl:     melatonin 5 mg Tab, Take 1 Tablet by mouth at bedtime as needed (insomnia)., Disp: , Rfl:     Ixekizumab (TALTZ) 80 MG/ML Solution Prefilled Syringe, Inject 80 mg under the skin every 28 days., Disp: , Rfl:      REVIEW OF SYSTEMS   ROS  Neurologic: no tics, tremors, dyskinesias. The patient denies dizziness, syncope, falls. Ambulates independently    PAST MEDICAL HISTORY  Past Medical History:   Diagnosis Date    Bipolar disorder (HCC)      No Known Allergies  No past surgical history on file.   No family history on file.  Social History     Socioeconomic History    Marital status: Single   Tobacco Use    Smoking status: Never    Smokeless tobacco: Never   Vaping Use    Vaping status: Never Used   Substance and Sexual Activity    Alcohol use: Not Currently    Drug use: Yes     Types: Inhaled     Comment: xavier     Social Drivers of Health     Financial Resource Strain: High Risk (9/5/2024)    Received from [facility]    Overall Financial Resource Strain (CARDIA)     Difficulty of Paying Living Expenses: Hard   Food Insecurity: No Food Insecurity (9/5/2024)    Received from [facility]    Hunger Vital Sign     Worried About Running Out of Food in the Last Year: Never true     Ran Out of Food in the Last Year: Never true   Transportation Needs: No Transportation Needs (9/5/2024)    Received from [facility]    PRAPARE - Transportation     Lack of Transportation (Medical): No     Lack of Transportation (Non-Medical): No   Intimate Partner Violence: Not At Risk (9/26/2024)    Humiliation, Afraid, Rape, and Kick questionnaire     Fear of Current or Ex-Partner: No      "Emotionally Abused: No     Physically Abused: No     Sexually Abused: No   Housing Stability: Low Risk  (9/5/2024)    Received from [facility]    Housing Stability Vital Sign     Unable to Pay for Housing in the Last Year: No     Number of Times Moved in the Last Year: 0     Homeless in the Last Year: No     No past surgical history on file.    PSYCHIATRIC EXAMINATION   There were no vitals taken for this visit.  Musculoskeletal: No abnormal movements noted  Appearance: well-developed, fair hygiene, and unkempt, cooperative and engaged  Thought Process:  perseverative  Abnormal or Psychotic Thoughts: Paranoia not prominent during presentation, some anxiety, but improving  Speech: regular rate, rhythm, volume, tone, and syntax and talkative  Mood: \"ok\"  Affect: euthymic, blunted though improving, and congruent with mood  SI/HI: Patient reports SI passive and diminishing intensity  Orientation: alert and oriented  Recent and Remote Memory: no gross impairment in immediate, recent, or remote memory  Attention Span and Concentration:  Insight/Judgement into symptoms: impaired/fair as evidenced by pt's perseverative and fixed beliefs related to hospitalization despite contrary evidence  Neurological Testing (MSSE Score and/or clock drawing): MMSE not performed during this encounter    SCREENINGS:      9/26/2024     9:26 PM 9/29/2024     8:10 PM   Depression Screen (PHQ-2/PHQ-9)   PHQ-2 Total Score 5 5   PHQ-9 Total Score 15 18          PREVENTATIVE CARE  Medication Monitoring: Mood Stabilizers: Lithium  Reviewed       Vitals   Discussed side effects including headache, drowsiness, dizziness, sedation, dry mouth, constipation, weight gain, orthostatic hypotension, hypertension, dyslipidemia, hyperglycemia, diabetes mellitus, akathisia/restlessness, tremors, muscle rigidity, acute dystonia, tardive dyskinesia etc.     [place]  records   reviewed.  No concerns about misuse of controlled substance.    CURRENT " RISK ASSESSMENT       Suicide: Low       Homicide: Low       Self-Harm: Low       Relapse: Low       Crisis Safety Plan Reviewed Not Indicated    ASSESSMENT/DIAGNOSES/PLAN  Problem List Items Addressed This Visit          Psychiatry Problems    Bipolar affective disorder, currently depressed, moderate (HCC)     Problem type: Chronic with acute exacerbation, current episode depressive.     Assessment: Affect improved per pt self-report and parents present. Paranoia not as prominent, decreasing both in intensity and perseverance as evidenced by decreased frequency discussing circular thoughts of paranoid beliefs during appointment. Pt does maintain a level of anxiety, both of his last hospitalization for acute yamila and anxiety about maintaining sleep. Would benefit from a structured treatment of CBT-I, as it appears anxious thoughts do perpetuate sleep disturbances. Tolerating Abilify, reports good effect. Therefore will continue, particularly monitoring sleep and paranoia. Continuing to encourage behavioral activation, getting out of home. Refilled other medications, encouraged pt to re-establish with PCP to continue appropriate management of comorbid medical conditions.     Plan  Medication:   - Continue Abilify 20mg daily for bipolar depression, maintenance  - Continue gabapentin 300mg BID PRN, will explore next visit use of this medication  - Continue Klonopin 0.5mg once daily PRN for anxiety/sleep, can break in half to minimize use  - Continue lithium 900mg qHS for maintenance.     Therapy: Discuss next appointment about therapy goals; if amenable, will start with goal primarily focusing of processing last hospitalization and, importantly, CBT-I.            Relevant Medications    gabapentin (NEURONTIN) 300 MG Cap    aripiprazole (ABILIFY) 20 MG tablet    Affective psychosis, bipolar (HCC)    Relevant Medications    gabapentin (NEURONTIN) 300 MG Cap    aripiprazole (ABILIFY) 20 MG tablet     Other Visit  Diagnoses       Hypothyroidism, unspecified type        Relevant Medications    levothyroxine (SYNTHROID) 112 MCG Tab    Hyperlipidemia, unspecified hyperlipidemia type        Relevant Medications    gemfibrozil (LOPID) 600 MG Tab               Medication options, alternatives (including no medications) and medication risks/benefits/side effects were discussed in detail.  The patient was advised to call, message clinician on [facility], or come in to the clinic if symptoms worsen or if questions/issues regarding their medications arise.  The patient verbalized understanding and agreement.    The patient was educated to call 911, call the suicide hotline, or go to the local ER if having thoughts of suicide or homicide.  The patient verbalized understanding and agreement.   The proposed treatment plan was discussed with the patient who was provided the opportunity to ask questions and make suggestions regarding alternative treatment. Patient verbalized understanding and expressed agreement with the plan.      Follow-up in one month.      This appointment was supervised by attending psychiatrist, Sorin Busby M.D. , who agrees with assessment and treatment plan.  See attending attestation for more details.

## 2025-01-13 NOTE — PATIENT INSTRUCTIONS
Here are a list of resources for therapy:    https://evievada.org/    Therapy in a Nutshell    Crisis Plan:  Calling clinic. Calling a 24-hour crisis hotline number 988. Calling 911. Utilizing the ED in the event of an emergency.     1-800-QUIT-NOW (681-887-3650) or enroll online at [email or web address]    [facility] Resources    [facility] Therapy Resources    [facility]  Address: [address] [place] [address]  Phone: [phone]  Notes: [facility] is a consortium of private practitioners who advertise together, and are in business separately as the following: Denisha Loaiza, Marriage and Family Therapist, Licensed Clinical Alcohol & Drug Counselor; Mike Wong [facility] Practitioner; Tiny He, Occupational Therapist and Reiki Master; Katheryn Troy, Marriage and Family Therapist  [facility]  Address: [address] [place] [address] [place] [address]  Phone: [phone]  Notes: ADHD skills building, ACT Therapy, CBT, EMDR, Trauma focused therapy, Play based therapy, Family therapy  [facility]  Address: [address], [place] [address]  Phone: [phone]  Notes: Depression, Anxiety and stress management, marriage and family, DBT, EMDR  [facility]  Address: [address] [place] [address]  Phone: [phone]  Notes: wellness, mindfulness, self-care, LGBTQ+, goal setting and achievement   [facility]  Address: [address] [place] [address]  Phone: [phone]  Notes: Cognitive Behavioral Therapy (CBT), Dialectical Behavioral Therapy (DBT), Emotionally Focused Therapy (EFT), Gottman Couples Therapy, Positive Discipline and Child Education, Play Therapy  [facility]  Address: [address] [facility] [address]  Phone: [phone]  Notes: substance abuse, domestic violence, anger management, marriage and family counseling, mindfulness as well as Substance Abuse and Anger  Management/Domestic Violence Groups              [facility]: Denisha Fallon Psy.D, Neema Jensen LCSW, Connie Crawford, PhD  Address: [address] [facility] [address]  Phone: [phone]  Notes: sleep disorders and health related concerns such as chronic pain, depression and anxiety  Nate Winston, Ph.D at [facility]  Address: [address], [address], [place] [address]  Phone: [phone]  Notes: sport psychology, resilience, motivation, self-talk, confidence  [facility]  Address: [address], [address], [facility], [address]  Phone: [phone]  Notes: Certified [facility] ([facility]), offering peer services, case management, crisis intervention, Basic Skills Training, and Psycho-Social Rehabilitation; provides MAT for adolescent and adult opioid users, family therapy   [facility]  Address: [address] [place] [address]  Phone: [phone]  Notes: Many group class options, Domestic Violence, Anger Management, Italian speaking, Substance Abuse, Peaceful families parenting, Family therapy, Sliding scale fees  [facility]  Address: [address] [place] [address]  Phone: [phone]   Notes: EMDR, Italian speaking, Psychedelic assisted therapy, couples and family counseling  [facility]  Address: [address] [place] [address]  Phone: [phone]  Notes: Testing: Cognitive evaluations, Learning disability evaluations, ADHD evaluations, Pre-employment evaluations, Fitness for duty evaluations, Bariatric surgery evaluations, Pain procedure evaluations  [facility]  Address: [address], [place] [address]  Phone: [phone]  Notes: Borderline Personality Disorder, EMDR, Grief Counseling, Couples/family/parenting

## 2025-01-24 NOTE — ASSESSMENT & PLAN NOTE
Problem type: Chronic with acute exacerbation, current episode depressive.     Assessment: Affect improved per pt self-report and parents present. Paranoia not as prominent, decreasing both in intensity and perseverance as evidenced by decreased frequency discussing circular thoughts of paranoid beliefs during appointment. Pt does maintain a level of anxiety, both of his last hospitalization for acute yamila and anxiety about maintaining sleep. Would benefit from a structured treatment of CBT-I, as it appears anxious thoughts do perpetuate sleep disturbances. Tolerating Abilify, reports good effect. Therefore will continue, particularly monitoring sleep and paranoia. Continuing to encourage behavioral activation, getting out of home. Refilled other medications, encouraged pt to re-establish with PCP to continue appropriate management of comorbid medical conditions.     Plan  Medication:   - Continue Abilify 20mg daily for bipolar depression, maintenance  - Continue gabapentin 300mg BID PRN, will explore next visit use of this medication  - Continue Klonopin 0.5mg once daily PRN for anxiety/sleep, can break in half to minimize use  - Continue lithium 900mg qHS for maintenance.     Therapy: Discuss next appointment about therapy goals; if amenable, will start with goal primarily focusing of processing last hospitalization and, importantly, CBT-I.

## 2025-02-14 ENCOUNTER — TELEPHONE (OUTPATIENT)
Dept: SCHEDULING | Facility: IMAGING CENTER | Age: 40
End: 2025-02-14
Payer: MEDICAID

## 2025-02-24 ENCOUNTER — OFFICE VISIT (OUTPATIENT)
Dept: BEHAVIORAL HEALTH | Facility: PSYCHIATRIC FACILITY | Age: 40
End: 2025-02-24
Payer: MEDICAID

## 2025-02-24 VITALS
SYSTOLIC BLOOD PRESSURE: 120 MMHG | HEART RATE: 86 BPM | BODY MASS INDEX: 30.93 KG/M2 | WEIGHT: 254 LBS | HEIGHT: 76 IN | DIASTOLIC BLOOD PRESSURE: 72 MMHG | OXYGEN SATURATION: 96 %

## 2025-02-24 DIAGNOSIS — Z79.899 HIGH RISK MEDICATION USE: ICD-10-CM

## 2025-02-24 DIAGNOSIS — F42.2 MIXED OBSESSIONAL THOUGHTS AND ACTS: ICD-10-CM

## 2025-02-24 DIAGNOSIS — F31.5 BIPOLAR DISORDER, CURRENT EPISODE DEPRESSED, SEVERE, WITH PSYCHOTIC FEATURES (HCC): ICD-10-CM

## 2025-02-24 DIAGNOSIS — F51.05 INSOMNIA DUE TO OTHER MENTAL DISORDER: ICD-10-CM

## 2025-02-24 DIAGNOSIS — F99 INSOMNIA DUE TO OTHER MENTAL DISORDER: ICD-10-CM

## 2025-02-24 DIAGNOSIS — F31.32 BIPOLAR AFFECTIVE DISORDER, CURRENTLY DEPRESSED, MODERATE (HCC): ICD-10-CM

## 2025-02-24 DIAGNOSIS — E78.5 HYPERLIPIDEMIA, UNSPECIFIED HYPERLIPIDEMIA TYPE: ICD-10-CM

## 2025-02-24 PROBLEM — F29 PSYCHOSIS (HCC): Status: RESOLVED | Noted: 2024-09-27 | Resolved: 2025-02-24

## 2025-02-24 PROBLEM — F31.9 AFFECTIVE PSYCHOSIS, BIPOLAR (HCC): Status: RESOLVED | Noted: 2024-12-09 | Resolved: 2025-02-24

## 2025-02-24 RX ORDER — GEMFIBROZIL 600 MG/1
600 TABLET, FILM COATED ORAL 2 TIMES DAILY
Qty: 180 TABLET | Refills: 3 | Status: SHIPPED | OUTPATIENT
Start: 2025-02-24

## 2025-02-24 ASSESSMENT — ENCOUNTER SYMPTOMS
TREMORS: 0
NERVOUS/ANXIOUS: 0

## 2025-02-24 ASSESSMENT — FIBROSIS 4 INDEX: FIB4 SCORE: 0.63

## 2025-02-24 NOTE — PROGRESS NOTES
"[facility] Outpatient Psychiatric Follow Up Note  Evaluation completed by: Elias Villa M.D.   Date of Service: 02/24/2025  Appointment type: In-person  Attending:  Sorin Busby M.D.   Information below was collected from: Patient, father, mother    CHIEF COMPLIANT:  Follow-up BD-I    HPI:   eLe Yadav is a 39 y.o. old male who presents today for regularly scheduled follow up for assessment of BD-I.    Patient reports mood has been \"pretty good.\"  Sleep remains difficult with initiating but is able to get adequate amount of sleep.  Does continue to struggle with sleep hygiene prior to going to bed, spending time with electronic devices.  Finds nominal benefit from melatonin.  The patient does not feel he needs the gabapentin for daytime anxiety or insomnia, but does not find it particularly helpful.  Has not used a single dose of Klonopin since coming back from the holidays, has not found the need.  Continues to tolerate the lithium and Abilify.  Did wonder if he could eventually come off of the Abilify once past the acute and postacute manic phase.  Denies SI.  Does continue to have anxiety when going out in public, though does not carry the same weight of paranoid/persecutory delusional thinking as prior appointments.  He frames it more as getting back into normal activities after not going out in public much for the last several months.  Both parents commented that they noticed while the patient does certainly have fewer ups and downs, he appears to be \"in a rut.\"  He does not have much of a structured schedule at this time but does engage more readily in family social activities and phone calls.  Appetite and daytime energy are intact.  Patient shared that he plans on transitioning to a [place] PCP and psychiatrists, currently in the process.  May need 1 additional appointment during this transition.    PSYCHIATRIC REVIEW OF SYSTEMS:current symptoms as reported by pt.  Depression: Difficulty " "sleeping and Anhedonia  Carina: Patient denies any change in mood, increased energy, or marked irritability  Anxiety/Panic Attacks: \"Very little.\"  Trauma: Patient reports no signs or symptoms indicative of PTSD  Psychosis: Patient reports no signs or symptoms indicative of psychosis    CURRENT MEDICATIONS    Current Outpatient Medications:     gemfibrozil (LOPID) 600 MG Tab, Take 1 Tablet by mouth 2 times a day., Disp: 180 Tablet, Rfl: 3    lithium carbonate 300 MG capsule, Take 3 Capsules by mouth at bedtime., Disp: 270 Capsule, Rfl: 2    aripiprazole (ABILIFY) 20 MG tablet, Take 1 Tablet by mouth every day., Disp: 90 Tablet, Rfl: 3    levothyroxine (SYNTHROID) 112 MCG Tab, Take 1 Tablet by mouth every morning on an empty stomach., Disp: 90 Tablet, Rfl: 3    melatonin 5 mg Tab, Take 1 Tablet by mouth at bedtime as needed (insomnia)., Disp: , Rfl:     Ixekizumab (TALTZ) 80 MG/ML Solution Prefilled Syringe, Inject 80 mg under the skin every 28 days., Disp: , Rfl:      REVIEW OF SYSTEMS   Review of Systems   Neurological:  Negative for tremors.   Psychiatric/Behavioral:  Negative for suicidal ideas. The patient is not nervous/anxious.      Neurologic: no tics, tremors, dyskinesias. The patient denies dizziness, syncope, falls. Ambulates independently    PAST MEDICAL HISTORY  Past Medical History:   Diagnosis Date    Bipolar disorder (HCC)      No Known Allergies  No past surgical history on file.   No family history on file.  Social History     Socioeconomic History    Marital status: Single   Tobacco Use    Smoking status: Never    Smokeless tobacco: Never   Vaping Use    Vaping status: Never Used   Substance and Sexual Activity    Alcohol use: Not Currently    Drug use: Yes     Types: Inhaled     Comment: xavier     Social Drivers of Health     Financial Resource Strain: High Risk (9/5/2024)    [facility]    Overall Financial Resource Strain (CARDIA)     Difficulty of Paying Living " "Expenses: Hard   Food Insecurity: No Food Insecurity (9/5/2024)    Received from [facility]    Hunger Vital Sign     Worried About Running Out of Food in the Last Year: Never true     Ran Out of Food in the Last Year: Never true   Transportation Needs: No Transportation Needs (9/5/2024)    Received from [facility]    PRAPARE - Transportation     Lack of Transportation (Medical): No     Lack of Transportation (Non-Medical): No   Intimate Partner Violence: Not At Risk (9/26/2024)    Humiliation, Afraid, Rape, and Kick questionnaire     Fear of Current or Ex-Partner: No     Emotionally Abused: No     Physically Abused: No     Sexually Abused: No   Housing Stability: Low Risk  (9/5/2024)    Received from [facility]    Housing Stability Vital Sign     Unable to Pay for Housing in the Last Year: No     Number of Times Moved in the Last Year: 0     Homeless in the Last Year: No     No past surgical history on file.    PSYCHIATRIC EXAMINATION   /72 (BP Location: Left arm, Patient Position: Sitting, BP Cuff Size: Large adult)   Pulse 86   Ht 1.93 m (6' 4\")   Wt 115 kg (254 lb)   SpO2 96%   BMI 30.92 kg/m²   Musculoskeletal: No abnormal movements noted  Appearance: well-developed, fair hygiene, cooperative and engaged  Thought Process: Linear, coherent, goal-oriented  Abnormal or Psychotic Thoughts: Paranoia not prominent during presentation, some anxiety, but improving  Speech: regular rate, rhythm, volume, tone, and syntax and talkative  Mood: \"Pretty good.\"  Affect: euthymic, blunted though improving with appropriate reactivity, laughing appropriately during encounter, and congruent with mood  SI/HI: Denies  Orientation: alert and oriented  Recent and Remote Memory: no gross impairment in immediate, recent, or remote memory  Attention Span and Concentration:  Insight/Judgement into symptoms: impaired/fair as evidenced by pt's perseverative and fixed beliefs related to " "hospitalization despite contrary evidence  Neurological Testing (MSSE Score and/or clock drawing): MMSE not performed during this encounter    SCREENINGS:      9/26/2024     9:26 PM 9/29/2024     8:10 PM   Depression Screen (PHQ-2/PHQ-9)   PHQ-2 Total Score 5 5   PHQ-9 Total Score 15 18      Pan negative AIMS    PREVENTATIVE CARE  Medication Monitoring: Mood Stabilizers: Lithium  Reviewed       Vitals Encounter Vitals  Blood Pressure: 120/72  Pulse: 86  Pulse Oximetry: 96 %  Weight: 115 kg (254 lb)  Height: 193 cm (6' 4\")  BMI (Calculated): 30.92 Discussed side effects including headache, drowsiness, dizziness, sedation, dry mouth, constipation, weight gain, orthostatic hypotension, hypertension, dyslipidemia, hyperglycemia, diabetes mellitus, akathisia/restlessness, tremors, muscle rigidity, acute dystonia, tardive dyskinesia etc.     [place]  records   reviewed.  No concerns about misuse of controlled substance.    CURRENT RISK ASSESSMENT       Suicide: Low       Homicide: Low       Self-Harm: Low       Relapse: Low       Crisis Safety Plan Reviewed Not Indicated    ASSESSMENT/DIAGNOSES/PLAN  Problem List Items Addressed This Visit          Psychiatry Problems    Insomnia due to other mental disorder    Per plan for BD1.         Bipolar affective disorder, currently depressed, moderate (HCC)    Problem type: Chronic, current episode depressive, improving     Assessment: Per patient report and parents, mood significantly stabilized with some blunting and cognitive slowing but no SI and improved engagement, closer to baseline.  We will continue with combination of lithium and Abilify, may have discussion down the road to very slowly taper but only worth considering probably after a year of stability, in the meantime emphasizing behavioral measures to improve sleep and reengage with routine (exercise, socialization, work).  Sleep initiation remains an issue, discussed sleep hygiene.  Patient expressed goal of " reengaging with exercise routine.  Plans to establish with [facility] provider, we will have patient follow up with the  to coordinate care to avoid lapsing treatment.     Plan  Medication:   - Continue Abilify 20mg daily for bipolar depression, maintenance  -Stop gabapentin 300mg BID PRN, will explore next visit use of this medication  - Continue Klonopin 0.5mg once daily PRN for anxiety/sleep, can break in half to minimize use  - Continue lithium 900mg qHS for maintenance.     Therapy: Has not started, recommend when establishing with new provider in [place] given [employer].    Labs: Ordered a new set of baseline labs  Lithium  CBC  CMP  TSH  Hemoglobin A1c         Obsessive compulsive disorder    Problem type: Newer onset, improved     Assessment:   Today no overt obsessive thought processes.  Appears these thoughts were largely present during the acute and immediately postacute manic phase, with improvement since being on therapeutic doses of both the lithium and Abilify.  Previously since being discharged from inpatient in 10/2024, and even during the tail-end of that hospitalization, the pt has demonstrated obsessive thought processes related to hospitalization and paranoia toward the hospital and others (pts, staff, random people in public) that has been significantly distressing to the pt and impairing, particularly affecting sleep. He has attempted to ignore or distract self from these thoughts with little success, often finding self thinking about these things prior to sleeping and has difficulty returning to sleep in middle of night d/t these thoughts. These thoughts appear nearly delusional in nature, as they are fixed and poorly-supported, yet are persistent despite contrary perspectives and attempts at obtaining hospital documentation demosntrating quite the contrary. At this time, pt is being cross-tapered per plan for BD1. Will continue monitoring these obsessional, paranoid thoughts, as the  Abilify may help ameliorate the severity of these thoughts with time, while also targeting his mood and sleep dysregulation.     Plan  Medication: per BD1 plan.     Therapy: per BD1 plan.            RESOLVED: Affective psychosis, bipolar (HCC)    Relevant Orders    LITHIUM    Lipid Profile    HEMOGLOBIN A1C    CBC WITH DIFFERENTIAL    TSH       Other    High risk medication use    Relevant Orders    LITHIUM    Lipid Profile    HEMOGLOBIN A1C    CBC WITH DIFFERENTIAL    TSH     Other Visit Diagnoses         Hyperlipidemia, unspecified hyperlipidemia type        Relevant Medications    gemfibrozil (LOPID) 600 MG Tab    Other Relevant Orders    LITHIUM    Lipid Profile    HEMOGLOBIN A1C    CBC WITH DIFFERENTIAL    TSH          Medication options, alternatives (including no medications) and medication risks/benefits/side effects were discussed in detail.  The patient was advised to call, message clinician on [employer], or come in to the clinic if symptoms worsen or if questions/issues regarding their medications arise.  The patient verbalized understanding and agreement.    The patient was educated to call 911, call the suicide hotline, or go to the local ER if having thoughts of suicide or homicide.  The patient verbalized understanding and agreement.   The proposed treatment plan was discussed with the patient who was provided the opportunity to ask questions and make suggestions regarding alternative treatment. Patient verbalized understanding and expressed agreement with the plan.      Follow-up in two month.      This appointment was supervised by attending psychiatrist, Sorin Busby M.D. , who agrees with assessment and treatment plan.  See attending attestation for more details.

## 2025-02-25 NOTE — ASSESSMENT & PLAN NOTE
Problem type: Chronic, current episode depressive, improving     Assessment: Per patient report and parents, mood significantly stabilized with some blunting and cognitive slowing but no SI and improved engagement, closer to baseline.  We will continue with combination of lithium and Abilify, may have discussion down the road to very slowly taper but only worth considering probably after a year of stability, in the meantime emphasizing behavioral measures to improve sleep and reengage with routine (exercise, socialization, work).  Sleep initiation remains an issue, discussed sleep hygiene.  Patient expressed goal of reengaging with exercise routine.  Plans to establish with [facility] provider, we will have patient follow up with the  to coordinate care to avoid lapsing treatment.     Plan  Medication:   - Continue Abilify 20mg daily for bipolar depression, maintenance  -Stop gabapentin 300mg BID PRN, will explore next visit use of this medication  - Continue Klonopin 0.5mg once daily PRN for anxiety/sleep, can break in half to minimize use  - Continue lithium 900mg qHS for maintenance.     Therapy: Has not started, recommend when establishing with new provider in [place] given [employer].    Labs: Ordered a new set of baseline labs  Lithium  CBC  CMP  TSH  Hemoglobin A1c

## 2025-02-25 NOTE — ASSESSMENT & PLAN NOTE
Problem type: Newer onset, improved     Assessment:   Today no overt obsessive thought processes.  Appears these thoughts were largely present during the acute and immediately postacute manic phase, with improvement since being on therapeutic doses of both the lithium and Abilify.  Previously since being discharged from inpatient in 10/2024, and even during the tail-end of that hospitalization, the pt has demonstrated obsessive thought processes related to hospitalization and paranoia toward the hospital and others (pts, staff, random people in public) that has been significantly distressing to the pt and impairing, particularly affecting sleep. He has attempted to ignore or distract self from these thoughts with little success, often finding self thinking about these things prior to sleeping and has difficulty returning to sleep in middle of night d/t these thoughts. These thoughts appear nearly delusional in nature, as they are fixed and poorly-supported, yet are persistent despite contrary perspectives and attempts at obtaining hospital documentation demosntrating quite the contrary. At this time, pt is being cross-tapered per plan for BD1. Will continue monitoring these obsessional, paranoid thoughts, as the Abilify may help ameliorate the severity of these thoughts with time, while also targeting his mood and sleep dysregulation.     Plan  Medication: per BD1 plan.     Therapy: per BD1 plan.

## 2025-03-27 DIAGNOSIS — F31.5 BIPOLAR DISORDER, CURRENT EPISODE DEPRESSED, SEVERE, WITH PSYCHOTIC FEATURES (HCC): ICD-10-CM

## 2025-03-27 RX ORDER — ARIPIPRAZOLE 20 MG/1
20 TABLET ORAL DAILY
Qty: 90 TABLET | Refills: 3 | OUTPATIENT
Start: 2025-03-27

## 2025-04-02 RX ORDER — ARIPIPRAZOLE 20 MG/1
20 TABLET ORAL DAILY
Qty: 90 TABLET | Refills: 3 | Status: SHIPPED | OUTPATIENT
Start: 2025-04-02